# Patient Record
Sex: FEMALE | Race: WHITE | NOT HISPANIC OR LATINO | Employment: FULL TIME | URBAN - METROPOLITAN AREA
[De-identification: names, ages, dates, MRNs, and addresses within clinical notes are randomized per-mention and may not be internally consistent; named-entity substitution may affect disease eponyms.]

---

## 2017-01-16 ENCOUNTER — OFFICE VISIT (OUTPATIENT)
Dept: FAMILY MEDICINE | Facility: CLINIC | Age: 42
End: 2017-01-16
Payer: COMMERCIAL

## 2017-01-16 VITALS
SYSTOLIC BLOOD PRESSURE: 120 MMHG | RESPIRATION RATE: 18 BRPM | TEMPERATURE: 98.1 F | HEIGHT: 64 IN | DIASTOLIC BLOOD PRESSURE: 84 MMHG | HEART RATE: 70 BPM | WEIGHT: 191 LBS | BODY MASS INDEX: 32.61 KG/M2

## 2017-01-16 DIAGNOSIS — R10.11 ABDOMINAL PAIN, RIGHT UPPER QUADRANT: Primary | ICD-10-CM

## 2017-01-16 LAB
ALBUMIN SERPL-MCNC: 3.9 G/DL (ref 3.4–5)
ALP SERPL-CCNC: 88 U/L (ref 40–150)
ALT SERPL W P-5'-P-CCNC: 40 U/L (ref 0–50)
AMYLASE SERPL-CCNC: 34 U/L (ref 30–110)
ANION GAP SERPL CALCULATED.3IONS-SCNC: 9 MMOL/L (ref 3–14)
AST SERPL W P-5'-P-CCNC: 18 U/L (ref 0–45)
BASOPHILS # BLD AUTO: 0 10E9/L (ref 0–0.2)
BASOPHILS NFR BLD AUTO: 0.2 %
BILIRUB SERPL-MCNC: 0.8 MG/DL (ref 0.2–1.3)
BUN SERPL-MCNC: 8 MG/DL (ref 7–30)
CALCIUM SERPL-MCNC: 9 MG/DL (ref 8.5–10.1)
CHLORIDE SERPL-SCNC: 107 MMOL/L (ref 94–109)
CO2 SERPL-SCNC: 24 MMOL/L (ref 20–32)
CREAT SERPL-MCNC: 0.75 MG/DL (ref 0.52–1.04)
DIFFERENTIAL METHOD BLD: NORMAL
EOSINOPHIL # BLD AUTO: 0.2 10E9/L (ref 0–0.7)
EOSINOPHIL NFR BLD AUTO: 2.8 %
ERYTHROCYTE [DISTWIDTH] IN BLOOD BY AUTOMATED COUNT: 12 % (ref 10–15)
GFR SERPL CREATININE-BSD FRML MDRD: 86 ML/MIN/1.7M2
GLUCOSE SERPL-MCNC: 90 MG/DL (ref 70–99)
HCT VFR BLD AUTO: 40.7 % (ref 35–47)
HGB BLD-MCNC: 14.1 G/DL (ref 11.7–15.7)
LIPASE SERPL-CCNC: 137 U/L (ref 73–393)
LYMPHOCYTES # BLD AUTO: 1.9 10E9/L (ref 0.8–5.3)
LYMPHOCYTES NFR BLD AUTO: 23 %
MCH RBC QN AUTO: 32 PG (ref 26.5–33)
MCHC RBC AUTO-ENTMCNC: 34.6 G/DL (ref 31.5–36.5)
MCV RBC AUTO: 92 FL (ref 78–100)
MONOCYTES # BLD AUTO: 0.5 10E9/L (ref 0–1.3)
MONOCYTES NFR BLD AUTO: 5.8 %
NEUTROPHILS # BLD AUTO: 5.5 10E9/L (ref 1.6–8.3)
NEUTROPHILS NFR BLD AUTO: 68.2 %
PLATELET # BLD AUTO: 207 10E9/L (ref 150–450)
POTASSIUM SERPL-SCNC: 3.9 MMOL/L (ref 3.4–5.3)
PROT SERPL-MCNC: 7.3 G/DL (ref 6.8–8.8)
RBC # BLD AUTO: 4.41 10E12/L (ref 3.8–5.2)
SODIUM SERPL-SCNC: 140 MMOL/L (ref 133–144)
WBC # BLD AUTO: 8.1 10E9/L (ref 4–11)

## 2017-01-16 PROCEDURE — 99214 OFFICE O/P EST MOD 30 MIN: CPT | Performed by: NURSE PRACTITIONER

## 2017-01-16 PROCEDURE — 36415 COLL VENOUS BLD VENIPUNCTURE: CPT | Performed by: NURSE PRACTITIONER

## 2017-01-16 PROCEDURE — 80053 COMPREHEN METABOLIC PANEL: CPT | Performed by: NURSE PRACTITIONER

## 2017-01-16 PROCEDURE — 85025 COMPLETE CBC W/AUTO DIFF WBC: CPT | Performed by: NURSE PRACTITIONER

## 2017-01-16 PROCEDURE — 82150 ASSAY OF AMYLASE: CPT | Performed by: NURSE PRACTITIONER

## 2017-01-16 PROCEDURE — 83690 ASSAY OF LIPASE: CPT | Performed by: NURSE PRACTITIONER

## 2017-01-16 RX ORDER — ONDANSETRON 4 MG/1
4-8 TABLET, ORALLY DISINTEGRATING ORAL
Qty: 30 TABLET | Refills: 1 | Status: SHIPPED | OUTPATIENT
Start: 2017-01-16 | End: 2018-02-28

## 2017-01-16 ASSESSMENT — ANXIETY QUESTIONNAIRES
3. WORRYING TOO MUCH ABOUT DIFFERENT THINGS: SEVERAL DAYS
5. BEING SO RESTLESS THAT IT IS HARD TO SIT STILL: NOT AT ALL
IF YOU CHECKED OFF ANY PROBLEMS ON THIS QUESTIONNAIRE, HOW DIFFICULT HAVE THESE PROBLEMS MADE IT FOR YOU TO DO YOUR WORK, TAKE CARE OF THINGS AT HOME, OR GET ALONG WITH OTHER PEOPLE: SOMEWHAT DIFFICULT
7. FEELING AFRAID AS IF SOMETHING AWFUL MIGHT HAPPEN: SEVERAL DAYS
6. BECOMING EASILY ANNOYED OR IRRITABLE: NOT AT ALL
2. NOT BEING ABLE TO STOP OR CONTROL WORRYING: SEVERAL DAYS
1. FEELING NERVOUS, ANXIOUS, OR ON EDGE: SEVERAL DAYS
GAD7 TOTAL SCORE: 7

## 2017-01-16 ASSESSMENT — PATIENT HEALTH QUESTIONNAIRE - PHQ9: 5. POOR APPETITE OR OVEREATING: NEARLY EVERY DAY

## 2017-01-16 NOTE — PROGRESS NOTES
"  SUBJECTIVE:                                                    Bhavana Castellanos is a 41 year old female who presents to clinic today for the following health issues:      Nausea      Duration: x3 days    Description (location/character/radiation): nausea, RUQ abdominal pain, vomiting (x1). See below.    Intensity:  mild, moderate    Therapies tried and outcome: Advil for headache which helped, tums did not help neasua.     Bhavana is in the clinic today with c/o abdominal discomfort, \"its more discomfort than pain,\" and nausea.  Some constipation, last BM yesterday. No blood in her stool.  Appetite way down.  Drinking fluids okay and urinating.  She is uncertain why her symptoms started, \"it all just came on gradually.\"    Problem list and histories reviewed & adjusted, as indicated.  Additional history: as documented    Patient Active Problem List   Diagnosis     Acquired hypothyroidism     CARDIOVASCULAR SCREENING; LDL GOAL LESS THAN 160     Joint pain     Moderate major depression (H)     Vitamin D deficiency disease     Past Surgical History   Procedure Laterality Date     No history of surgery         Social History   Substance Use Topics     Smoking status: Never Smoker      Smokeless tobacco: Never Used     Alcohol Use: Yes      Comment: once per weeks      Family History   Problem Relation Age of Onset     CANCER Father      kidney cancer: in remission     Hypertension Brother      Hypertension Maternal Grandfather      DIABETES Brother      DIABETES Maternal Grandfather      Other Cancer Father      Kidney     Other Cancer Maternal Grandmother      Other Cancer Paternal Grandfather      Depression Father      Thyroid Disease Maternal Grandmother          Current Outpatient Prescriptions   Medication Sig Dispense Refill     levothyroxine (SYNTHROID, LEVOTHROID) 112 MCG tablet Take 1 tablet (112 mcg) by mouth every morning 90 tablet 3     MULTIVITAMIN CHEW   OR None Entered       VITAMIN D 2000 UNIT OR TABS " "None Entered       Allergies   Allergen Reactions     Amoxicillin Rash     BP Readings from Last 3 Encounters:   01/16/17 120/84   06/10/16 117/80   06/09/15 98/70    Wt Readings from Last 3 Encounters:   01/16/17 191 lb (86.637 kg)   06/10/16 187 lb (84.823 kg)   06/09/15 173 lb 9.6 oz (78.744 kg)            Labs reviewed in EPIC  Problem list, Medication list, Allergies, and Medical/Social/Surgical histories reviewed in [facility] and updated as appropriate.    ROS:  Constitutional, HEENT, cardiovascular, pulmonary, gi and gu systems are negative, except as otherwise noted.    OBJECTIVE:                                                    /84 mmHg  Pulse 70  Temp(Src) 98.1  F (36.7  C) (Oral)  Resp 18  Ht 5' 3.5\" (1.613 m)  Wt 191 lb (86.637 kg)  BMI 33.30 kg/m2  LMP 12/26/2016  Breastfeeding? No  Body mass index is 33.3 kg/(m^2).  GENERAL: healthy, alert and no distress  NECK: no adenopathy, no asymmetry, masses, or scars and thyroid normal to palpation  RESP: lungs clear to auscultation - no rales, rhonchi or wheezes  CV: regular rate and rhythm, normal S1 S2, no S3 or S4, no murmur, click or rub, no peripheral edema and peripheral pulses strong  ABDOMEN: soft, nontender, no hepatosplenomegaly, no masses and bowel sounds normal. No rebound or guarding. No CVA tenderness.  SKIN: warm and dry  PSYCH: mentation appears normal, affect normal/bright    Diagnostic Test Results:  Labs drawn and pending.  US ordered.     ASSESSMENT/PLAN:                                                    (R10.11) Abdominal pain, right upper quadrant  (primary encounter diagnosis)  Comment: uncertain. Differentials: Gallbladder, intestinal, liver issues, etc.  Plan: CBC with platelets and differential,         Comprehensive metabolic panel, Amylase, Lipase,        US Abdomen Complete, ondansetron (ZOFRAN ODT) 4        MG ODT tab        Will start with labs and US.  I did discuss the differentials with the patient.  I also " discussed red flag symptoms and reasons to go to the ER.  She will start with dietary modifications and zofran.  Push fluids.  Return or ER if problems.  I will communicate results via mychart.  She agrees/understands.        CLEVELAND Prado [facility]

## 2017-01-16 NOTE — MR AVS SNAPSHOT
"              After Visit Summary   1/16/2017    Bhavana Castellanos    MRN: [id number]           Patient Information     Date Of Birth          1975        Visit Information        Provider Department      1/16/2017 10:00 AM Ana Rosales APRN [facility]        Today's Diagnoses     Abdominal pain, right upper quadrant    -  1       Care Instructions    I would a bland, low fat diet for now.  Push clear fluids (water, power alexis).  Limit juices as they may make you more nauseated.  You can take the zofran as prescribed for nausea.  Abdominal ultrasound at your earliest convenience. [phone] and ask for radiology/ultrasound scheduling.  If at any time your symptoms worsen or become concerning, go to the ER.        [place] Diet  Your healthcare provider may recommend a bland diet if you have an upset stomach. It consists of foods that are mild and easy to digest. It is better to eat small frequent meals rather than 3 large meals a day.    Beverages  OK: Fruit juices, non-caffeinated teas and coffee, non-carbonated monahan  Avoid: Carbonated beverage, caffeinated tea and coffee, all alcoholic beverages  Bread  OK: Refined white, wheat or rye bread, liberty or soda crackers, [place] toast, plain rolls, bagels  Avoid: Whole-grain bread  Cereal  OK: Refined cereals: cooked or ready to eat  Avoid: Whole-grain cereals and granola, or those containing bran, seeds or nuts  Desserts  OK: Peanut butter and all others except those to \"avoid\"  Avoid: Chocolate, cocoa, coconut, popcorn, nuts, seeds, jam, marmalade  Fruits  OK: Canned, cooked, frozen or fresh fruits without seeds or tough skin  Avoid: Olives, skin and seeds of fruit  Meats  OK: All fresh or preserved meat, fish and fowl  Avoid: Any that are prepared with those spices to \"avoid\"  Cheese and eggs  OK: Eggs, cottage cheese, cream cheese, other cheeses  Avoid: All cheeses made with those spices to \"avoid\"  Potatoes and pasta  OK: " "Potato, rice, macaroni, noodles, spaghetti  Avoid: None  Soups  OK: All soups without heavy seasoning  Avoid: Soups made with those spices to \"avoid\"  Vegetables  OK: Canned, cooked, fresh or frozen mildly flavored vegetables without seeds, skins or coarse fiber  Avoid: Vegetables prepared with those spices to \"avoid\"; skin and seeds of vegetables and those with coarse fiber  Spices  OK: Salt, lemon and lime juice, vinegar, all extracts, dain, cinnamon, thyme, mace, allspice, paprika  Avoid: Chili powder, cloves, pepper, seed spices, garlic, gravy pickles, highly seasoned salad dressings    6087-0516 [employer]. [address] [place] [address]. All rights reserved. This information is not intended as a substitute for professional medical care. Always follow your healthcare professional's instructions.        Low-Fat Diet    A low-fat diet will help you lose weight. It also can lower cholesterol and prevent symptoms of gallbladder disease. The average American diet contains up to 50% fat. This means that 50% of all calories come from fat (80 grams to 100 grams of fat per day). Choosing normal portions of foods from the list below can help lower your fat intake. The [place] of [employer] recommends 20% to 35% of your daily calories come from fat. The American Heart Association suggests limiting the amount of unhealthy fats in your diet to fewer than 7% of calories from saturated fats and fewer than 1% from trans fats. The remaining 65% to 80% of calories will come from protein and carbohydrates. This is much healthier for you.  Beverages  Ok: Nonfat milk, coffee, tea, carbonated beverages  Avoid: Whole and reduced-fat milk, evaporated and condensed milk; hot chocolate mixes, milk shakes, malts, eggnog  Bread  Ok: Whole wheat or rye bread, liberty or soda crackers, jojo toast, plain rolls, bagels, English muffins  Avoid: Rolls and breads containing whole milk or egg, waffles, pancakes, " "biscuits, corn bread; cheese crackers, other flavored crackers, pastries, doughnuts  Cereal  Ok: Oatmeal, whole wheat, bran, multi grain, rice  Avoid: Granola or other cereals containing oil, coconut, or more than 2 grams of fat per serving  Desserts  Ok: Gelatin, slushy, flaquito food cake, puddings or sherbet made with nonfat milk, meringues, and nonfat yogurt  Avoid: Any other commercially prepared desserts or desserts containing fat, whole milk, cream, chocolate, and coconut  Fats  Ok: You may have up to 3 teaspoons of fat daily. This can be in the form of butter, margarine, mayonnaise, or healthy oils (canola or olive)  Avoid: Cream, non-dairy creams, cream cheese, gravies, and cream sauces  Fruits  Ok: All fruits prepared without fat  Avoid: Coconut, olives  Meats, poultry, fish  Ok: Limit meat to 6 oz daily (broiled, roasted, baked, grilled, or boiled). Select lean cuts, well-trimmed of fat: beef, fish, lamb, pork and canned fish packed in water; chicken and turkey with the skin removed.  Avoid: Fried meats, fish, poultry, fried eggs and fish canned in oils; fatty meats such as augustine, sausage, corned beef, hot dogs, luncheon meats, or meats with gravies and sauces  Cheese and eggs  Ok: Cheeses labeled \"low fat\"; 3 whole eggs per week, egg whites and egg substitutes as desired  Avoid: All other cheeses  Potatoes, beans, pasta  Ok: Dried beans, split peas, lentils, potatoes, rice, pasta prepared without added fat  Avoid: French fries, potato chips, potatoes prepared with butter, refried beans  Soups  Ok: Bouillon or broth soups without fat and with allowed vegetables  Avoid: Cream based soups  Vegetables  Ok: Fresh, frozen, canned or dried vegetables all prepared without added fat  Avoid: Fried vegetables and those prepared with butter, cream, sauces  Miscellaneous  Ok: Salt, sugar, jelly, hard candy, marshmallows, honey, syrup, spices and herbs, mustard, catsup, lemon, vinegar (to maintain an overall healthy " diet, try to limit sweets and added sugars)  Avoid: Pizza, chocolate, nuts, coconut, cream candies, sunflower, sesame, and other seeds, fried foods, and cream sauces and gravies    9066-8908 [employer]. [address] [place] [address]. All rights reserved. This information is not intended as a substitute for professional medical care. Always follow your healthcare professional's instructions.      Abdominal Pain  Abdominal pain is pain in the stomach or intestinal area. Everyone has this pain from time to time. In many cases it goes away on its own. But abdominal pain can sometimes be due to a serious problem, such as appendicitis. So it s important to know when to seek help.  Causes of abdominal pain  There are many possible causes of abdominal pain. Common causes in adults include:    Constipation, diarrhea, or gas    GERD (gastroesophageal reflux disease) movement of stomach acid into the esophagus, also known as acid reflux or heartburn    Peptic ulcer (a sore in the lining of the stomach or small intestine)    Inflammation of the gallbladder, liver, or pancreas    Gallstones or kidney stones    Appendicitis     Obstruction of the intestines     Hernia (bulging of an internal organ through a muscle or other tissue)    Urinary tract infections    In women, menstrual cramps, fibroids, or endometriosis of the uterus    Inflammation or infection of the intestines  Diagnosing the cause of abdominal pain  Your health care provider will examine you to help find the cause of your pain. If needed, tests will be ordered. Because abdominal pain has so many possible causes, it can be hard to discover the reason for the pain. Giving details about your pain can help. Be ready to tell your health care provider where and when you feel the pain and what makes it better or worse. Also mention whether you have other symptoms such as fever, tiredness, nausea, vomiting, or changes in bathroom  habits.  Treating abdominal pain  Certain causes of pain, such as appendicitis or a bowel obstruction, need emergency treatment. Other problems can be treated with rest, fluids, or medications. Your health care provider can give you specific instructions for treatment or self-care based on the cause of your pain.  If you have vomiting or diarrhea, sip water or other clear fluids. When you are ready to eat solid foods again, start with small amounts of easy-to-digest, low-fat foods, such as applesauce, toast, or crackers.   When to call the doctor  Call 911 or go to the hospital right away if you:    Can t pass stool and are vomiting    Are vomiting blood or have black, tarry diarrhea    Also have chest, neck, or shoulder pain    Feel like you are about to pass out    Have pain in your shoulder blades with nausea    Have sudden, excruciating abdominal pain    Have new, severe pain unlike any you have felt before    Have a belly that is rigid, hard, and tender to touch  Call your doctor if you have:    Pain for more than 5 days    Bloating for more than 2 days    Diarrhea for more than 5 days    Fever of 101 F (38.3 C) or higher    Pain that continues to worsen    Unexplained weight loss    Continued lack of appetite    Blood in the stool  How to prevent abdominal pain  Here are some tips to help prevent abdominal pain:    Eat smaller amounts of food at one time.    Avoid greasy, fried, or other high-fat foods.    Avoid foods that give you gas.    Exercise regularly.    Drink plenty of fluids.  To help prevent symptoms of gastroesophageal reflux disease (GERD):    Quit smoking.    Reduce alcohol and certain foods that increase stomach acid.     Lose excess weight.    Finish eating at least 2 hours before you go to bed or lie down.    Elevate the head of your bed.    7421-5888 [employer]. [address] [place] [address], [place] [address]. All rights reserved. This information is not intended as a substitute for  "professional medical care. Always follow your healthcare professional's instructions.                  Follow-ups after your visit        Future tests that were ordered for you today     Open Future Orders        Priority Expected Expires Ordered    US Abdomen Complete Routine  1/16/2018 1/16/2017            Who to contact     If you have questions or need follow up information about today's clinic visit or your schedule please contact [facility] directly at [phone].  Normal or non-critical lab and imaging results will be communicated to you by [employer], letter or phone within 4 business days after the clinic has received the results. If you do not hear from us within 7 days, please contact the clinic through irisnotet or phone. If you have a critical or abnormal lab result, we will notify you by phone as soon as possible.  Submit refill requests through app2you or call your pharmacy and they will forward the refill request to us. Please allow 3 business days for your refill to be completed.          Additional Information About Your Visit        [employer] Information     app2you gives you secure access to your electronic health record. If you see a primary care provider, you can also send messages to your care team and make appointments. If you have questions, please call your primary care clinic.  If you do not have a primary care provider, please call [phone] and they will assist you.        Care EveryWhere ID     This is your Care EveryWhere ID. This could be used by other organizations to access your [facility] medical records  [id number]        Your Vitals Were     Pulse Temperature Respirations Height BMI (Body Mass Index) Last Period    70 98.1  F (36.7  C) (Oral) 18 5' 3.5\" (1.613 m) 33.30 kg/m2 12/26/2016    Breastfeeding?                   No            Blood Pressure from Last 3 Encounters:   01/16/17 120/84   06/10/16 117/80   06/09/15 98/70    Weight from Last 3 Encounters: "   01/16/17 191 lb (86.637 kg)   06/10/16 187 lb (84.823 kg)   06/09/15 173 lb 9.6 oz (78.744 kg)              We Performed the Following     Amylase     CBC with platelets and differential     Comprehensive metabolic panel     Lipase          Today's Medication Changes          These changes are accurate as of: 1/16/17 10:25 AM.  If you have any questions, ask your nurse or doctor.               Start taking these medicines.        Dose/Directions    ondansetron 4 MG ODT tab   Commonly known as:  ZOFRAN ODT   Used for:  Abdominal pain, right upper quadrant   Started by:  Ana Rosales APRN CNP        Dose:  4-8 mg   Take 1-2 tablets (4-8 mg) by mouth 3 times daily (before meals)   Quantity:  30 tablet   Refills:  1            Where to get your medicines      These medications were sent to [facility] - [place], [place] - [address], [place] [address]     Phone:  [phone]    - ondansetron 4 MG ODT tab             Primary Care Provider Office Phone # Fax #    CLEVELAND Mccloud [phone]       [facility] [address] [place] [address]        Thank you!     Thank you for choosing [facility]  for your care. Our goal is always to provide you with excellent care. Hearing back from our patients is one way we can continue to improve our services. Please take a few minutes to complete the written survey that you may receive in the mail after your visit with us. Thank you!             Your Updated Medication List - Protect others around you: Learn how to safely use, store and throw away your medicines at www.disposemymeds.org.          This list is accurate as of: 1/16/17 10:25 AM.  Always use your most recent med list.                   Brand Name Dispense Instructions for use    levothyroxine 112 MCG tablet    SYNTHROID/LEVOTHROID    90 tablet    Take 1 tablet (112 mcg) by mouth every morning        MULTIVITAMIN CHEW   OR      None Entered       ondansetron 4 MG ODT tab    ZOFRAN ODT    30 tablet    Take 1-2 tablets (4-8 mg) by mouth 3 times daily (before meals)       vitamin D 2000 UNITS tablet      None Entered

## 2017-01-16 NOTE — NURSING NOTE
"Chief Complaint   Patient presents with     Nausea       Initial /84 mmHg  Pulse 70  Temp(Src) 98.1  F (36.7  C) (Oral)  Resp 18  Ht 5' 3.5\" (1.613 m)  Wt 191 lb (86.637 kg)  BMI 33.30 kg/m2  LMP 12/26/2016  Breastfeeding? No Estimated body mass index is 33.3 kg/(m^2) as calculated from the following:    Height as of this encounter: 5' 3.5\" (1.613 m).    Weight as of this encounter: 191 lb (86.637 kg).  BP completed using cuff size: juliana Maria CMA   "

## 2017-01-16 NOTE — PATIENT INSTRUCTIONS
"I would a bland, low fat diet for now.  Push clear fluids (water, power alexis).  Limit juices as they may make you more nauseated.  You can take the zofran as prescribed for nausea.  Abdominal ultrasound at your earliest convenience. [phone] and ask for radiology/ultrasound scheduling.  If at any time your symptoms worsen or become concerning, go to the ER.        [place] Diet  Your healthcare provider may recommend a bland diet if you have an upset stomach. It consists of foods that are mild and easy to digest. It is better to eat small frequent meals rather than 3 large meals a day.    Beverages  OK: Fruit juices, non-caffeinated teas and coffee, non-carbonated monahan  Avoid: Carbonated beverage, caffeinated tea and coffee, all alcoholic beverages  Bread  OK: Refined white, wheat or rye bread, liberty or soda crackers, Isa toast, plain rolls, bagels  Avoid: Whole-grain bread  Cereal  OK: Refined cereals: cooked or ready to eat  Avoid: Whole-grain cereals and granola, or those containing bran, seeds or nuts  Desserts  OK: Peanut butter and all others except those to \"avoid\"  Avoid: Chocolate, cocoa, coconut, popcorn, nuts, seeds, jam, marmalade  Fruits  OK: Canned, cooked, frozen or fresh fruits without seeds or tough skin  Avoid: Olives, skin and seeds of fruit  Meats  OK: All fresh or preserved meat, fish and fowl  Avoid: Any that are prepared with those spices to \"avoid\"  Cheese and eggs  OK: Eggs, cottage cheese, cream cheese, other cheeses  Avoid: All cheeses made with those spices to \"avoid\"  Potatoes and pasta  OK: Potato, rice, macaroni, noodles, spaghetti  Avoid: None  Soups  OK: All soups without heavy seasoning  Avoid: Soups made with those spices to \"avoid\"  Vegetables  OK: Canned, cooked, fresh or frozen mildly flavored vegetables without seeds, skins or coarse fiber  Avoid: Vegetables prepared with those spices to \"avoid\"; skin and seeds of vegetables and those with coarse fiber  Spices  OK: Salt, " lemon and lime juice, vinegar, all extracts, dain, cinnamon, thyme, mace, allspice, paprika  Avoid: Chili powder, cloves, pepper, seed spices, garlic, gravy pickles, highly seasoned salad dressings    6346-4556 [employer]. [address] [place] [address]. All rights reserved. This information is not intended as a substitute for professional medical care. Always follow your healthcare professional's instructions.        Low-Fat Diet    A low-fat diet will help you lose weight. It also can lower cholesterol and prevent symptoms of gallbladder disease. The average American diet contains up to 50% fat. This means that 50% of all calories come from fat (80 grams to 100 grams of fat per day). Choosing normal portions of foods from the list below can help lower your fat intake. The [employer] recommends 20% to 35% of your daily calories come from fat. The American Heart Association suggests limiting the amount of unhealthy fats in your diet to fewer than 7% of calories from saturated fats and fewer than 1% from trans fats. The remaining 65% to 80% of calories will come from protein and carbohydrates. This is much healthier for you.  Beverages  Ok: Nonfat milk, coffee, tea, carbonated beverages  Avoid: Whole and reduced-fat milk, evaporated and condensed milk; hot chocolate mixes, milk shakes, malts, eggnog  Bread  Ok: Whole wheat or rye bread, liberty or soda crackers, jojo toast, plain rolls, bagels, English muffins  Avoid: Rolls and breads containing whole milk or egg, waffles, pancakes, biscuits, corn bread; cheese crackers, other flavored crackers, pastries, doughnuts  Cereal  Ok: Oatmeal, whole wheat, bran, multi grain, rice  Avoid: Granola or other cereals containing oil, coconut, or more than 2 grams of fat per serving  Desserts  Ok: Gelatin, slushy, flaquito food cake, puddings or sherbet made with nonfat milk, meringues, and nonfat yogurt  Avoid: Any other commercially prepared  "desserts or desserts containing fat, whole milk, cream, chocolate, and coconut  Fats  Ok: You may have up to 3 teaspoons of fat daily. This can be in the form of butter, margarine, mayonnaise, or healthy oils (canola or olive)  Avoid: Cream, non-dairy creams, cream cheese, gravies, and cream sauces  Fruits  Ok: All fruits prepared without fat  Avoid: Coconut, olives  Meats, poultry, fish  Ok: Limit meat to 6 oz daily (broiled, roasted, baked, grilled, or boiled). Select lean cuts, well-trimmed of fat: beef, fish, lamb, pork and canned fish packed in water; chicken and turkey with the skin removed.  Avoid: Fried meats, fish, poultry, fried eggs and fish canned in oils; fatty meats such as augustine, sausage, corned beef, hot dogs, luncheon meats, or meats with gravies and sauces  Cheese and eggs  Ok: Cheeses labeled \"low fat\"; 3 whole eggs per week, egg whites and egg substitutes as desired  Avoid: All other cheeses  Potatoes, beans, pasta  Ok: Dried beans, split peas, lentils, potatoes, rice, pasta prepared without added fat  Avoid: French fries, potato chips, potatoes prepared with butter, refried beans  Soups  Ok: Bouillon or broth soups without fat and with allowed vegetables  Avoid: Cream based soups  Vegetables  Ok: Fresh, frozen, canned or dried vegetables all prepared without added fat  Avoid: Fried vegetables and those prepared with butter, cream, sauces  Miscellaneous  Ok: Salt, sugar, jelly, hard candy, marshmallows, honey, syrup, spices and herbs, mustard, catsup, lemon, vinegar (to maintain an overall healthy diet, try to limit sweets and added sugars)  Avoid: Pizza, chocolate, nuts, coconut, cream candies, sunflower, sesame, and other seeds, fried foods, and cream sauces and gravies    8725-7380 [employer]. [address] [place] [address], [place] [address]. All rights reserved. This information is not intended as a substitute for professional medical care. Always follow your healthcare professional's " instructions.      Abdominal Pain  Abdominal pain is pain in the stomach or intestinal area. Everyone has this pain from time to time. In many cases it goes away on its own. But abdominal pain can sometimes be due to a serious problem, such as appendicitis. So it s important to know when to seek help.  Causes of abdominal pain  There are many possible causes of abdominal pain. Common causes in adults include:    Constipation, diarrhea, or gas    GERD (gastroesophageal reflux disease) movement of stomach acid into the esophagus, also known as acid reflux or heartburn    Peptic ulcer (a sore in the lining of the stomach or small intestine)    Inflammation of the gallbladder, liver, or pancreas    Gallstones or kidney stones    Appendicitis     Obstruction of the intestines     Hernia (bulging of an internal organ through a muscle or other tissue)    Urinary tract infections    In women, menstrual cramps, fibroids, or endometriosis of the uterus    Inflammation or infection of the intestines  Diagnosing the cause of abdominal pain  Your health care provider will examine you to help find the cause of your pain. If needed, tests will be ordered. Because abdominal pain has so many possible causes, it can be hard to discover the reason for the pain. Giving details about your pain can help. Be ready to tell your health care provider where and when you feel the pain and what makes it better or worse. Also mention whether you have other symptoms such as fever, tiredness, nausea, vomiting, or changes in bathroom habits.  Treating abdominal pain  Certain causes of pain, such as appendicitis or a bowel obstruction, need emergency treatment. Other problems can be treated with rest, fluids, or medications. Your health care provider can give you specific instructions for treatment or self-care based on the cause of your pain.  If you have vomiting or diarrhea, sip water or other clear fluids. When you are ready to eat solid foods  again, start with small amounts of easy-to-digest, low-fat foods, such as applesauce, toast, or crackers.   When to call the doctor  Call 911 or go to the hospital right away if you:    Can t pass stool and are vomiting    Are vomiting blood or have black, tarry diarrhea    Also have chest, neck, or shoulder pain    Feel like you are about to pass out    Have pain in your shoulder blades with nausea    Have sudden, excruciating abdominal pain    Have new, severe pain unlike any you have felt before    Have a belly that is rigid, hard, and tender to touch  Call your doctor if you have:    Pain for more than 5 days    Bloating for more than 2 days    Diarrhea for more than 5 days    Fever of 101 F (38.3 C) or higher    Pain that continues to worsen    Unexplained weight loss    Continued lack of appetite    Blood in the stool  How to prevent abdominal pain  Here are some tips to help prevent abdominal pain:    Eat smaller amounts of food at one time.    Avoid greasy, fried, or other high-fat foods.    Avoid foods that give you gas.    Exercise regularly.    Drink plenty of fluids.  To help prevent symptoms of gastroesophageal reflux disease (GERD):    Quit smoking.    Reduce alcohol and certain foods that increase stomach acid.     Lose excess weight.    Finish eating at least 2 hours before you go to bed or lie down.    Elevate the head of your bed.    3846-8057 [employer]. [address] [place] [address], [place] [address]. All rights reserved. This information is not intended as a substitute for professional medical care. Always follow your healthcare professional's instructions.

## 2017-01-17 ENCOUNTER — HOSPITAL ENCOUNTER (OUTPATIENT)
Dept: ULTRASOUND IMAGING | Facility: CLINIC | Age: 42
Discharge: HOME | End: 2017-01-17
Attending: NURSE PRACTITIONER | Admitting: NURSE PRACTITIONER
Payer: COMMERCIAL

## 2017-01-17 DIAGNOSIS — R10.11 ABDOMINAL PAIN, RIGHT UPPER QUADRANT: ICD-10-CM

## 2017-01-17 PROCEDURE — 76700 US EXAM ABDOM COMPLETE: CPT

## 2017-01-17 ASSESSMENT — ANXIETY QUESTIONNAIRES: GAD7 TOTAL SCORE: 7

## 2017-01-17 NOTE — PROGRESS NOTES
Quick Note:    Lori Bateman,    This is to let you know that the results of your recent blood tests are all normal. There is no sign of infection or liver involvement. Please proceed with the ultrasound as we had discussed at your appointment.    Ana GUTHRIE CNP    ______

## 2017-01-18 NOTE — PROGRESS NOTES
Quick Note:    Lori Bateman,    This note is to let you know the results of your ultrasound.    The aortic, gallbladder, bile duct, pancreas, spleen, and kidneys are all normal.    Your liver has a small cyst in the left side (not cancer) and this should not be causing your problems. Also, your liver has fat through it (infiltrated). For this I would recommend a low fat diet and weight loss.    Let me know if you have any questions.    I hope you are doing okay.    Ana GUTHRIE CNP    ______

## 2017-06-12 ENCOUNTER — OFFICE VISIT (OUTPATIENT)
Dept: FAMILY MEDICINE | Facility: CLINIC | Age: 42
End: 2017-06-12
Payer: COMMERCIAL

## 2017-06-12 VITALS
HEIGHT: 63 IN | OXYGEN SATURATION: 98 % | DIASTOLIC BLOOD PRESSURE: 77 MMHG | HEART RATE: 70 BPM | SYSTOLIC BLOOD PRESSURE: 125 MMHG | TEMPERATURE: 98.3 F | BODY MASS INDEX: 31.18 KG/M2 | RESPIRATION RATE: 20 BRPM | WEIGHT: 176 LBS

## 2017-06-12 DIAGNOSIS — Z12.31 VISIT FOR SCREENING MAMMOGRAM: ICD-10-CM

## 2017-06-12 DIAGNOSIS — Z23 NEED FOR TETANUS BOOSTER: ICD-10-CM

## 2017-06-12 DIAGNOSIS — Z01.419 ENCOUNTER FOR GYNECOLOGICAL EXAMINATION WITHOUT ABNORMAL FINDING: Primary | ICD-10-CM

## 2017-06-12 DIAGNOSIS — L72.11 PILAR CYST: ICD-10-CM

## 2017-06-12 DIAGNOSIS — F32.1 MAJOR DEPRESSIVE DISORDER, SINGLE EPISODE, MODERATE (H): ICD-10-CM

## 2017-06-12 DIAGNOSIS — E03.9 ACQUIRED HYPOTHYROIDISM: ICD-10-CM

## 2017-06-12 DIAGNOSIS — Z11.3 SCREEN FOR STD (SEXUALLY TRANSMITTED DISEASE): ICD-10-CM

## 2017-06-12 LAB
DEPRECATED CALCIDIOL+CALCIFEROL SERPL-MC: 27 UG/L (ref 20–75)
HCV AB SERPL QL IA: NORMAL
HGB BLD-MCNC: 14.3 G/DL (ref 11.7–15.7)
HIV 1+2 AB+HIV1 P24 AG SERPL QL IA: NORMAL

## 2017-06-12 PROCEDURE — 84443 ASSAY THYROID STIM HORMONE: CPT | Performed by: NURSE PRACTITIONER

## 2017-06-12 PROCEDURE — 82947 ASSAY GLUCOSE BLOOD QUANT: CPT | Performed by: NURSE PRACTITIONER

## 2017-06-12 PROCEDURE — 82306 VITAMIN D 25 HYDROXY: CPT | Performed by: NURSE PRACTITIONER

## 2017-06-12 PROCEDURE — 90471 IMMUNIZATION ADMIN: CPT | Performed by: NURSE PRACTITIONER

## 2017-06-12 PROCEDURE — 80061 LIPID PANEL: CPT | Performed by: NURSE PRACTITIONER

## 2017-06-12 PROCEDURE — 99396 PREV VISIT EST AGE 40-64: CPT | Mod: 25 | Performed by: NURSE PRACTITIONER

## 2017-06-12 PROCEDURE — 36415 COLL VENOUS BLD VENIPUNCTURE: CPT | Performed by: NURSE PRACTITIONER

## 2017-06-12 PROCEDURE — 85018 HEMOGLOBIN: CPT | Performed by: NURSE PRACTITIONER

## 2017-06-12 PROCEDURE — 86803 HEPATITIS C AB TEST: CPT | Performed by: NURSE PRACTITIONER

## 2017-06-12 PROCEDURE — 86780 TREPONEMA PALLIDUM: CPT | Performed by: NURSE PRACTITIONER

## 2017-06-12 PROCEDURE — 90715 TDAP VACCINE 7 YRS/> IM: CPT | Performed by: NURSE PRACTITIONER

## 2017-06-12 PROCEDURE — 87389 HIV-1 AG W/HIV-1&-2 AB AG IA: CPT | Performed by: NURSE PRACTITIONER

## 2017-06-12 RX ORDER — LEVOTHYROXINE SODIUM 112 UG/1
112 TABLET ORAL DAILY
Qty: 90 TABLET | Refills: 3 | Status: SHIPPED | OUTPATIENT
Start: 2017-06-12 | End: 2018-06-15

## 2017-06-12 NOTE — PROGRESS NOTES
"   SUBJECTIVE:     CC: Bhavana Castellanos is an 41 year old woman who presents for preventive health visit.     Physical   Annual:     Getting at least 3 servings of Calcium per day::  Yes    Bi-annual eye exam::  Yes    Dental care twice a year::  Yes    Sleep apnea or symptoms of sleep apnea::  Daytime drowsiness    Diet::  Other    Frequency of exercise::  4-5 days/week    Duration of exercise::  30-45 minutes    Taking medications regularly::  Yes    Medication side effects::  None    Additional concerns today::  YES    1. cyst on top of head-     Depression:  Going well.  Seasonal.  Not on meds.  Not currently in therapy.    Weight:  Down.  Avoiding high fructose corn syrup.  \"the weight just started falling off.\"        Today's PHQ-2 Score:   PHQ-2 ( 1999 [employer]) 6/7/2017   Q1: Little interest or pleasure in doing things 1   Q2: Feeling down, depressed or hopeless 1   PHQ-2 Score 2   Q1: Little interest or pleasure in doing things Several days   Q2: Feeling down, depressed or hopeless Several days   PHQ-2 Score 2       Abuse: Current or Past(Physical, Sexual or Emotional)- No  Do you feel safe in your environment - Yes    Social History   Substance Use Topics     Smoking status: Never Smoker     Smokeless tobacco: Never Used     Alcohol use Yes      Comment: once per weeks      The patient does not drink >3 drinks per day nor >7 drinks per week.    Recent Labs   Lab Test  06/09/15   1108  06/12/12   1528   CHOL  119  146   HDL  44*  39*   LDL  59  88   TRIG  82  94   CHOLHDLRATIO  2.7  3.7       Reviewed orders with patient.  Reviewed health maintenance and updated orders accordingly - Yes    Mammo Decision Support:  Patient under age 50, mutual decision reflected in health maintenance.      Pertinent mammograms are reviewed under the imaging tab.  History of abnormal Pap smear:   Last 3 Pap Results:   PAP (no units)   Date Value   06/09/2015 NIL   06/12/2012 NIL   03/05/2010 NIL       Reviewed and updated as " needed this visit by clinical staff  Tobacco  Allergies  Meds  Med Hx  Surg Hx  Fam Hx  Soc Hx        Reviewed and updated as needed this visit by Provider            ROS:  C: NEGATIVE for fever, chills, change in weight  I: NEGATIVE for worrisome rashes, moles or lesions  E: NEGATIVE for vision changes or irritation  ENT: NEGATIVE for ear, mouth and throat problems  R: NEGATIVE for significant cough or SOB  B: NEGATIVE for masses, tenderness or discharge  CV: NEGATIVE for chest pain, palpitations or peripheral edema  GI: NEGATIVE for nausea, abdominal pain, heartburn, or change in bowel habits  : NEGATIVE for unusual urinary or vaginal symptoms. Periods are regular.  M: NEGATIVE for significant arthralgias or myalgia  N: NEGATIVE for weakness, dizziness or paresthesias  E: NEGATIVE for temperature intolerance, skin/hair changes  P: NEGATIVE for changes in mood or affect    Problem list, Medication list, Allergies, and Medical/Social/Surgical histories reviewed in [facility] and updated as appropriate.  Labs reviewed in EPIC  BP Readings from Last 3 Encounters:   06/12/17 125/77   01/16/17 120/84   06/10/16 117/80    Wt Readings from Last 3 Encounters:   06/12/17 176 lb (79.8 kg)   01/16/17 191 lb (86.6 kg)   06/10/16 187 lb (84.8 kg)                  Patient Active Problem List   Diagnosis     Acquired hypothyroidism     CARDIOVASCULAR SCREENING; LDL GOAL LESS THAN 160     Joint pain     Moderate major depression (H)     Vitamin D deficiency disease     Past Surgical History:   Procedure Laterality Date     NO HISTORY OF SURGERY         Social History   Substance Use Topics     Smoking status: Never Smoker     Smokeless tobacco: Never Used     Alcohol use Yes      Comment: once per weeks      Family History   Problem Relation Age of Onset     CANCER Father      kidney cancer: in remission     Other Cancer Father      Kidney     Depression Father      Other Cancer Maternal Grandmother      Thyroid Disease Maternal  "Grandmother      Hypertension Maternal Grandfather      DIABETES Maternal Grandfather      Other Cancer Paternal Grandfather      Hypertension Brother      DIABETES Brother          Current Outpatient Prescriptions   Medication Sig Dispense Refill     Omega-3 Fatty Acids (FISH OIL PO)        MILK THISTLE PO        levothyroxine (SYNTHROID/LEVOTHROID) 112 MCG tablet TAKE ONE TABLET BY MOUTH DAILY IN THE MORNING 30 tablet 0     ondansetron (ZOFRAN ODT) 4 MG ODT tab Take 1-2 tablets (4-8 mg) by mouth 3 times daily (before meals) 30 tablet 1     MULTIVITAMIN CHEW   OR None Entered       VITAMIN D 2000 UNIT OR TABS None Entered       Allergies   Allergen Reactions     Amoxicillin Rash     Recent Labs   Lab Test  01/16/17   1029  06/10/16   1036  06/09/15   1108   06/12/12   1528  06/21/11   1049   05/20/09   1932   LDL   --    --   59   --   88  93   --    --    HDL   --    --   44*   --   39*  38*   --    --    TRIG   --    --   82   --   94  180*   --    --    ALT  40   --    --    --    --    --    --    --    CR  0.75   --    --    --    --    --    --   0.88   GFRESTIMATED  86   --    --    --    --    --    --   74   GFRESTBLACK  >90   GFR Calc     --    --    --    --    --    --   >90   POTASSIUM  3.9   --    --    --    --    --    --   3.8   TSH   --   1.44  0.76   < >  2.05  1.59   < >  0.47    < > = values in this interval not displayed.      OBJECTIVE:     /77  Pulse 70  Temp 98.3  F (36.8  C) (Oral)  Resp 20  Ht 5' 3.25\" (1.607 m)  Wt 176 lb (79.8 kg)  SpO2 98%  Breastfeeding? No  BMI 30.93 kg/m2  EXAM:  GENERAL: healthy, alert and no distress  EYES: Eyes grossly normal to inspection, PERRL and conjunctivae and sclerae normal  HENT: ear canals and TM's normal, nose and mouth without ulcers or lesions  NECK: no adenopathy, no asymmetry, masses, or scars and thyroid normal to palpation  RESP: lungs clear to auscultation - no rales, rhonchi or wheezes  BREAST: normal without " masses, tenderness or nipple discharge and no palpable axillary masses or adenopathy  CV: regular rate and rhythm, normal S1 S2, no S3 or S4, no murmur, click or rub, no peripheral edema and peripheral pulses strong  ABDOMEN: soft, nontender, no hepatosplenomegaly, no masses and bowel sounds normal   (female): normal female external genitalia, normal urethral meatus, vaginal mucosa pink, moist, well rugated, and normal cervix/adnexa/uterus without masses or discharge  MS: no gross musculoskelet defects noted, no edema  SKIN: her skin is clear.  She has a domed nodule on her right upper scalp consistent with a pilar cyst.  NEURO: Normal strength and tone, mentation intact and speech normal  PSYCH: mentation appears normal, affect normal/bright    ASSESSMENT/PLAN:     (Z01.419) Encounter for gynecological examination without abnormal finding  (primary encounter diagnosis)  Comment:   Plan: Can Do Program Referral, Vitamin D Deficiency,         Lipid panel reflex to direct LDL, Hemoglobin,         Glucose        Weight is down--congratulations!    (F32.1) Major depressive disorder, single episode, moderate (H)  Comment: stable  Plan: DEPRESSION ACTION PLAN (DAP)        Stable.  She is not on meds at this time.  She will continue self cares and will follow up with me if her symptoms worsen in any way.     (E03.9) Acquired hypothyroidism  Comment:   Plan: TSH WITH FREE T4 REFLEX, levothyroxine         (SYNTHROID/LEVOTHROID) 112 MCG tablet        Labs pending.     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    (Z23) Need for tetanus  "booster  Comment:   Plan: TDAP VACCINE (ADACEL)        given      COUNSELING:  Reviewed preventive health counseling, as reflected in patient instructions         reports that she has never smoked. She has never used smokeless tobacco.    Estimated body mass index is 30.93 kg/(m^2) as calculated from the following:    Height as of this encounter: 5' 3.25\" (1.607 m).    Weight as of this encounter: 176 lb (79.8 kg).   Weight management plan: Discussed healthy diet and exercise guidelines and patient will follow up in 12 months in clinic to re-evaluate.    Counseling Resources:  ATP IV Guidelines  Pooled Cohorts Equation Calculator  Breast Cancer Risk Calculator  FRAX Risk Assessment  ICSI Preventive Guidelines  Dietary Guidelines for Americans, 2010  USDA's MyPlate  ASA Prophylaxis  Lung CA Screening    [facility]  Answers for HPI/ROS submitted by the patient on 6/7/2017   PHQ-2 Score: 2      "

## 2017-06-12 NOTE — MR AVS SNAPSHOT
After Visit Summary   6/12/2017    Bhavana Castellanos    MRN: [id number]           Patient Information     Date Of Birth          1975        Visit Information        Provider Department      6/12/2017 9:40 AM Ana Rosales APRN [facility]        Today's Diagnoses     Encounter for gynecological examination without abnormal finding    -  1    Major depressive disorder, single episode, moderate (H)        Acquired hypothyroidism        Pilar cyst        Screen for STD (sexually transmitted disease)        Visit for screening mammogram        Need for tetanus booster          Care Instructions      Preventive Health Recommendations  Female Ages 40 to 49    Yearly exam:     See your health care provider every year in order to  1. Review health changes.   2. Discuss preventive care.    3. Review your medicines if your doctor prescribed any.      Get a Pap test every three years (unless you have an abnormal result and your provider advises testing more often).      If you get Pap tests with HPV test, you only need to test every 5 years, unless you have an abnormal result. You do not need a Pap test if your uterus was removed (hysterectomy) and you have not had cancer.      You should be tested each year for STDs (sexually transmitted diseases), if you're at risk.       Ask your doctor if you should have a mammogram.      Have a colonoscopy (test for colon cancer) if someone in your family has had colon cancer or polyps before age 50.       Have a cholesterol test every 5 years.       Have a diabetes test (fasting glucose) after age 45. If you are at risk for diabetes, you should have this test every 3 years.    Shots: Get a flu shot each year. Get a tetanus shot every 10 years.     Nutrition:     Eat at least 5 servings of fruits and vegetables each day.    Eat whole-grain bread, whole-wheat pasta and brown rice instead of white grains and rice.    Talk to your provider  about Calcium and Vitamin D.     Lifestyle    Exercise at least 150 minutes a week (an average of 30 minutes a day, 5 days a week). This will help you control your weight and prevent disease.    Limit alcohol to one drink per day.    No smoking.     Wear sunscreen to prevent skin cancer.    See your dentist every six months for an exam and cleaning.          Follow-ups after your visit        Additional Services     Can Do Program Referral       CAN DO is different from other healthy lifestyle and weight loss programs. CAN DO personal health coaches support you in clarifying what you want for yourself and your well-being. Together we create a roadmap to guide your journey to thriving well-being - one step at a time - for lasting success. Working under the leadership of a physician medical director, our health coaching team includes a doctorate -level prepared expert in health management, a physician and an exercise specialist. We support you in identifying what is important to you, setting meaningful goals and addressing barriers to success. Together we aliya your progress over time. CAN DO has helped hundreds reach their goals: from people who just want to be healthier and enjoy more energy, to those who want to lose or gain weight.            GENERAL SURG ADULT REFERRAL       Your provider has referred you to: G: [facility] [place] [phone]   [email or web address]  UM: General Surgery [facility] [phone]   [email or web address]  UM: [place] [facility] [phone]   [email or web address]    Please be aware that coverage of these services is subject to the terms and limitations of your health insurance plan.  Call member services at your health plan with any benefit or coverage questions.      Please bring the following with you to your  "appointment:    (1) Any X-Rays, CTs or MRIs which have been performed.  Contact the facility where they were done to arrange for  prior to your scheduled appointment.   (2) List of current medications   (3) This referral request   (4) Any documents/labs given to you for this referral                  Future tests that were ordered for you today     Open Future Orders        Priority Expected Expires Ordered    MA SCREENING DIGITAL BILAT - Future  (s+30) Routine  6/12/2018 6/12/2017            Who to contact     If you have questions or need follow up information about today's clinic visit or your schedule please contact [facility] directly at [phone].  Normal or non-critical lab and imaging results will be communicated to you by MyChart, letter or phone within 4 business days after the clinic has received the results. If you do not hear from us within 7 days, please contact the clinic through [employer] or phone. If you have a critical or abnormal lab result, we will notify you by phone as soon as possible.  Submit refill requests through [employer] or call your pharmacy and they will forward the refill request to us. Please allow 3 business days for your refill to be completed.          Additional Information About Your Visit        MyChart Information     [employer] gives you secure access to your electronic health record. If you see a primary care provider, you can also send messages to your care team and make appointments. If you have questions, please call your primary care clinic.  If you do not have a primary care provider, please call [phone] and they will assist you.        Care EveryWhere ID     This is your Care EveryWhere ID. This could be used by other organizations to access your [place] medical records  [id number]        Your Vitals Were     Pulse Temperature Respirations Height Pulse Oximetry Breastfeeding?    70 98.3  F (36.8  C) (Oral) 20 5' 3.25\" (1.607 m) 98% No    " BMI (Body Mass Index)                   30.93 kg/m2            Blood Pressure from Last 3 Encounters:   06/12/17 125/77   01/16/17 120/84   06/10/16 117/80    Weight from Last 3 Encounters:   06/12/17 176 lb (79.8 kg)   01/16/17 191 lb (86.6 kg)   06/10/16 187 lb (84.8 kg)              We Performed the Following     Anti Treponema     Can Do Program Referral     DEPRESSION ACTION PLAN (DAP)     GENERAL SURG ADULT REFERRAL     Glucose     Hemoglobin     Hepatitis C antibody     HIV Antigen Antibody Combo     Lipid panel reflex to direct LDL     TDAP VACCINE (ADACEL)     TSH WITH FREE T4 REFLEX     Vitamin D Deficiency          Today's Medication Changes          These changes are accurate as of: 6/12/17  9:56 AM.  If you have any questions, ask your nurse or doctor.               These medicines have changed or have updated prescriptions.        Dose/Directions    levothyroxine 112 MCG tablet   Commonly known as:  SYNTHROID/LEVOTHROID   This may have changed:  See the new instructions.   Used for:  Acquired hypothyroidism   Changed by:  Ana Rosales APRN CNP        Dose:  112 mcg   Take 1 tablet (112 mcg) by mouth daily   Quantity:  90 tablet   Refills:  3            Where to get your medicines      These medications were sent to [address]  [address] [place] [id number]     Phone:  [phone]     levothyroxine 112 MCG tablet                Primary Care Provider Office Phone # Fax #    CLEVELAND Mccloud [phone]       [facility] [address] [place] [address]        Thank you!     Thank you for choosing [facility]  for your care. Our goal is always to provide you with excellent care. Hearing back from our patients is one way we can continue to improve our services. Please take a few minutes to complete the written survey that you may receive in the mail after your  visit with us. Thank you!             Your Updated Medication List - Protect others around you: Learn how to safely use, store and throw away your medicines at www.disposemymeds.org.          This list is accurate as of: 6/12/17  9:56 AM.  Always use your most recent med list.                   Brand Name Dispense Instructions for use    FISH OIL PO          levothyroxine 112 MCG tablet    SYNTHROID/LEVOTHROID    90 tablet    Take 1 tablet (112 mcg) by mouth daily       MILK THISTLE PO          MULTIVITAMIN CHEW   OR      None Entered       ondansetron 4 MG ODT tab    ZOFRAN ODT    30 tablet    Take 1-2 tablets (4-8 mg) by mouth 3 times daily (before meals)       vitamin D 2000 UNITS tablet      None Entered

## 2017-06-12 NOTE — NURSING NOTE
"Chief Complaint   Patient presents with     Physical       Initial /77  Pulse 70  Temp 98.3  F (36.8  C) (Oral)  Resp 20  Ht 5' 3.25\" (1.607 m)  Wt 176 lb (79.8 kg)  SpO2 98%  Breastfeeding? No  BMI 30.93 kg/m2 Estimated body mass index is 30.93 kg/(m^2) as calculated from the following:    Height as of this encounter: 5' 3.25\" (1.607 m).    Weight as of this encounter: 176 lb (79.8 kg).  Medication Reconciliation: complete       Manish Topete MA       "

## 2017-06-13 LAB
CHOLEST SERPL-MCNC: 137 MG/DL
GLUCOSE SERPL-MCNC: 76 MG/DL (ref 70–99)
HDLC SERPL-MCNC: 42 MG/DL
LDLC SERPL CALC-MCNC: 79 MG/DL
NONHDLC SERPL-MCNC: 95 MG/DL
T PALLIDUM IGG+IGM SER QL: NEGATIVE
TRIGL SERPL-MCNC: 79 MG/DL
TSH SERPL DL<=0.005 MIU/L-ACNC: 1.98 MU/L (ref 0.4–4)

## 2017-06-13 ASSESSMENT — PATIENT HEALTH QUESTIONNAIRE - PHQ9: SUM OF ALL RESPONSES TO PHQ QUESTIONS 1-9: 10

## 2017-06-14 NOTE — PROGRESS NOTES
Lori Bateman,    This note is to let you know the results of your recent lab studies.    Your comprehensive STD (sexually transmitted diseases) panel is negative.   There is no sign of HIV, hepatitis, syphilis, gonorrhea, or chlamydia.     Your blood sugar and thyroid results are all normal. Please continue taking your current thyroid dose and I will plan to recheck your thyroid level in 1 year.    Your cholesterol panel is good, except your HDL cholesterol (the good cholesterol) is slightly low. This may be improved with a diet low in fat and cholesterol in regular aerobic activity. I will plan to recheck your cholesterol level in 1-2 years.    Let me know if having questions. It was great seeing you again.    Ana GUTHRIE CNP

## 2017-06-16 ENCOUNTER — OFFICE VISIT (OUTPATIENT)
Dept: SURGERY | Facility: CLINIC | Age: 42
End: 2017-06-16
Payer: COMMERCIAL

## 2017-06-16 VITALS
SYSTOLIC BLOOD PRESSURE: 130 MMHG | HEIGHT: 63 IN | HEART RATE: 77 BPM | BODY MASS INDEX: 31.36 KG/M2 | WEIGHT: 177 LBS | DIASTOLIC BLOOD PRESSURE: 82 MMHG

## 2017-06-16 DIAGNOSIS — R22.0 SCALP MASS: Primary | ICD-10-CM

## 2017-06-16 PROCEDURE — 99242 OFF/OP CONSLTJ NEW/EST SF 20: CPT | Performed by: SURGERY

## 2017-06-16 NOTE — MR AVS SNAPSHOT
After Visit Summary   6/16/2017    Bhavana Castellanos    MRN: [id number]           Patient Information     Date Of Birth          1975        Visit Information        Provider Department      6/16/2017 3:45 PM Lori Acosta MD [facility] [place] [facility] General Surgery      Care Instructions    Your surgery is scheduled for 7/17/17 7:30 am at [facility]          Follow-ups after your visit        Your next 10 appointments already scheduled     Jul 17, 2017   Procedure with Lori Acosta MD   [facility] (--)    [address]  [place] [address] [phone]            Jul 17, 2017 11:00 AM CDT   Screening Mammogram with EAMA1   [facility] ([facility])    [address] ,[address]  [facility] [address] [phone]           Do NOT use body powder, lotions, perfume or deodorant the day of the exam.      If your last mammogram was not done at [facility], please bring your mammogram films. We will need the name of your provider to send a copy of your report.        A mammogram may be covered on an annual or biannual basis, please check with your insurance company.               Who to contact     If you have questions or need follow up information about today's clinic visit or your schedule please contact [facility] directly at [phone].  Normal or non-critical lab and imaging results will be communicated to you by MyChart, letter or phone within 4 business days after the clinic has received the results. If you do not hear from us within 7 days, please contact the clinic through MyChart or phone. If you have a critical or abnormal lab result, we will notify you by phone as soon as possible.  Submit refill requests through [employer] or call your pharmacy and they will forward the refill request to us. Please allow 3 business days for your refill to be  "completed.          Additional Information About Your Visit        [employer] Information     [employer] gives you secure access to your electronic health record. If you see a primary care provider, you can also send messages to your care team and make appointments. If you have questions, please call your primary care clinic.  If you do not have a primary care provider, please call [phone] and they will assist you.        Care EveryWhere ID     This is your Care EveryWhere ID. This could be used by other organizations to access your [place] medical records  [id number]        Your Vitals Were     Pulse Height BMI (Body Mass Index)             77 5' 3\" (1.6 m) 31.35 kg/m2          Blood Pressure from Last 3 Encounters:   06/16/17 130/82   06/12/17 125/77   01/16/17 120/84    Weight from Last 3 Encounters:   06/16/17 177 lb (80.3 kg)   06/12/17 176 lb (79.8 kg)   01/16/17 191 lb (86.6 kg)              Today, you had the following     No orders found for display       Primary Care Provider Office Phone # Fax #    CLEVELAND Mccloud [facility] [phone]       [facility] [address] [place] [address]        Thank you!     Thank you for choosing [facility]  for your care. Our goal is always to provide you with excellent care. Hearing back from our patients is one way we can continue to improve our services. Please take a few minutes to complete the written survey that you may receive in the mail after your visit with us. Thank you!             Your Updated Medication List - Protect others around you: Learn how to safely use, store and throw away your medicines at www.disposemymeds.org.          This list is accurate as of: 6/16/17  4:04 PM.  Always use your most recent med list.                   Brand Name Dispense Instructions for use    FISH OIL PO          levothyroxine 112 MCG tablet    SYNTHROID/LEVOTHROID    90 tablet    Take 1 tablet (112 mcg) by mouth " daily       MILK THISTLE PO          MULTIVITAMIN CHEW   OR      None Entered       ondansetron 4 MG ODT tab    ZOFRAN ODT    30 tablet    Take 1-2 tablets (4-8 mg) by mouth 3 times daily (before meals)       vitamin D 2000 UNITS tablet      None Entered

## 2017-06-16 NOTE — LETTER
"   2017    RE:  Bhavana Castellanos-:  [birth year]    HISTORY OF PRESENT ILLNESS:  Bhavana Castellanos is a 41 year old female who is seen in consultation at the request of Dr. Rosales for evaluation of multiple scalp lesions. She reports she has had \"bumps\" on her head since she was a teenager. There is one on her right lateral scalp which has increased in size over the past year and has become symptomatic. It is very bothersome when wearing her fencing mask. she believes one of the lesions has drained in the past. The right sided lesion has not. She currently has three palpable bumps. She has no other history of similar lesions. No family history of lipomas, skin cancers or soft tissue masses that she is aware of.      REVIEW OF SYSTEMS:  10 point review of systems completed and otherwise negative aside from as listed in HPI.      Vitals: /82  Pulse 77  Ht 5' 3\" (1.6 m)  Wt 177 lb (80.3 kg)  BMI 31.35 kg/m2  BMI= Body mass index is 31.35 kg/(m^2).     EXAM:  GENERAL: healthy, alert and no distress   PSYCH: pleasant, normal affect  HEENT: moist mucus membranes, no scleral icterus  CARDIOVASCULAR:  RRR  RESPIRATORY: non labored breathing  NECK: Neck supple. No adenopathy. Thyroid symmetric, normal size,  GI: soft, nontender, nondistended, no hernias palpable, no hepatosplenomegaly, normal bowel sounds  Extremities: warm and well perfused, no edema  SKIN: right scalp: 2.5cm raised lesion with underlying fluctuance, no erythema or induration. Left posterior scalp lesion 1cm in size, nontender, raised. Right posterior scalp 0.5cm raised lesion. No erythema or induration.    LYMPH: no axillary adenopathy     All labs and imaging personally reviewed and significant for: none     ASSESSMENT:  Bhavana Castellanos is a 41 year old with a PMH s/f hypothyroidism who presents with multiple scalp lesions, one of which is enlarging and symptomatic. I recommend excision of the two larger areas and consideration for " excision of the smaller lesion. We discussed the risks, benefits, indications and alternatives and she would like to proceed.          PLAN:  Excision of multiple scalp lesions under local anesthetic.      It was my pleasure to participate in the care of Bhavana Castellanos in clinic today. Thank you for this consultation.         Lori Acosta MD

## 2017-06-16 NOTE — PROGRESS NOTES
"[facility] General Surgery Clinic Consultation    CHIEF COMPLAINT:  Chief Complaint   Patient presents with     Consult     cyst on head       HISTORY OF PRESENT ILLNESS:  Bhavana Castellanos is a 41 year old female who is seen in consultation at the request of Dr. Rosales for evaluation of multiple scalp lesions. She reports she has had \"bumps\" on her head since she was a teenager. There is one on her right lateral scalp which has increased in size over the past year and has become symptomatic. It is very bothersome when wearing her fencing mask. she believes one of the lesions has drained in the past. The right sided lesion has not. She currently has three palpable bumps. She has no other history of similar lesions. No family history of lipomas, skin cancers or soft tissue masses that she is aware of.     REVIEW OF SYSTEMS:  10 point review of systems completed and otherwise negative aside from as listed in HPI.     Past Medical History:   Diagnosis Date     Depressive disorder 1994     Hypothyroidism        Past Surgical History:   Procedure Laterality Date     NO HISTORY OF SURGERY     [place] teeth     Family History   Problem Relation Age of Onset     CANCER Father      kidney cancer: in remission     Other Cancer Father      Kidney     Depression Father      Other Cancer Maternal Grandmother      Thyroid Disease Maternal Grandmother      Hypertension Maternal Grandfather      DIABETES Maternal Grandfather      Other Cancer Paternal Grandfather      Hypertension Brother      DIABETES Brother        Social History   Substance Use Topics     Smoking status: Never Smoker     Smokeless tobacco: Never Used     Alcohol use Yes      Comment: once per weeks        Patient Active Problem List   Diagnosis     Acquired hypothyroidism     CARDIOVASCULAR SCREENING; LDL GOAL LESS THAN 160     Joint pain     Moderate major depression (H)     Vitamin D deficiency disease       Allergies   Allergen Reactions     Amoxicillin " "Rash       Current Outpatient Prescriptions   Medication Sig Dispense Refill     Omega-3 Fatty Acids (FISH OIL PO)        MILK THISTLE PO        levothyroxine (SYNTHROID/LEVOTHROID) 112 MCG tablet Take 1 tablet (112 mcg) by mouth daily 90 tablet 3     ondansetron (ZOFRAN ODT) 4 MG ODT tab Take 1-2 tablets (4-8 mg) by mouth 3 times daily (before meals) 30 tablet 1     MULTIVITAMIN CHEW   OR None Entered       VITAMIN D 2000 UNIT OR TABS None Entered         Vitals: /82  Pulse 77  Ht 5' 3\" (1.6 m)  Wt 177 lb (80.3 kg)  BMI 31.35 kg/m2  BMI= Body mass index is 31.35 kg/(m^2).    EXAM:  GENERAL: healthy, alert and no distress   PSYCH: pleasant, normal affect  HEENT: moist mucus membranes, no scleral icterus  CARDIOVASCULAR:  RRR  RESPIRATORY: non labored breathing  NECK: Neck supple. No adenopathy. Thyroid symmetric, normal size,  GI: soft, nontender, nondistended, no hernias palpable, no hepatosplenomegaly, normal bowel sounds  Extremities: warm and well perfused, no edema  SKIN: right scalp: 2.5cm raised lesion with underlying fluctuance, no erythema or induration. Left posterior scalp lesion 1cm in size, nontender, raised. Right posterior scalp 0.5cm raised lesion. No erythema or induration.    LYMPH: no axillary adenopathy    All labs and imaging personally reviewed and significant for: none    ASSESSMENT:  Bhavana Castellanos is a 41 year old with a PMH s/f hypothyroidism who presents with multiple scalp lesions, one of which is enlarging and symptomatic. I recommend excision of the two larger areas and consideration for excision of the smaller lesion. We discussed the risks, benefits, indications and alternatives and she would like to proceed.        PLAN:  Excision of multiple scalp lesions under local anesthetic.     It was my pleasure to participate in the care of Bhavana Castellanos in clinic today. Thank you for this consultation.     Total time with patient visit: 25 minutes including discussions about the " plan of care and care coordination with the patient.    Lori Acosta MD    Please route or send letter to:  Primary Care Provider (PCP) and Referring Provider

## 2017-07-17 ENCOUNTER — APPOINTMENT (OUTPATIENT)
Dept: SURGERY | Facility: PHYSICIAN GROUP | Age: 42
End: 2017-07-17
Payer: COMMERCIAL

## 2017-07-17 ENCOUNTER — HOSPITAL ENCOUNTER (OUTPATIENT)
Facility: CLINIC | Age: 42
Discharge: HOME | End: 2017-07-17
Attending: SURGERY | Admitting: SURGERY
Payer: COMMERCIAL

## 2017-07-17 ENCOUNTER — SURGERY (OUTPATIENT)
Age: 42
End: 2017-07-17

## 2017-07-17 VITALS
OXYGEN SATURATION: 95 % | DIASTOLIC BLOOD PRESSURE: 93 MMHG | RESPIRATION RATE: 18 BRPM | BODY MASS INDEX: 32.43 KG/M2 | TEMPERATURE: 97.5 F | WEIGHT: 183 LBS | SYSTOLIC BLOOD PRESSURE: 135 MMHG | HEIGHT: 63 IN

## 2017-07-17 DIAGNOSIS — L72.3 SEBACEOUS CYST: Primary | ICD-10-CM

## 2017-07-17 PROCEDURE — 27210995 ZZH RX 272: Performed by: SURGERY

## 2017-07-17 PROCEDURE — 27210794 ZZH OR GENERAL SUPPLY STERILE: Performed by: SURGERY

## 2017-07-17 PROCEDURE — 36000052 ZZH SURGERY LEVEL 2 EA 15 ADDTL MIN: Performed by: SURGERY

## 2017-07-17 PROCEDURE — 88305 TISSUE EXAM BY PATHOLOGIST: CPT | Performed by: SURGERY

## 2017-07-17 PROCEDURE — 88305 TISSUE EXAM BY PATHOLOGIST: CPT | Mod: 26 | Performed by: SURGERY

## 2017-07-17 PROCEDURE — 71000027 ZZH RECOVERY PHASE 2 EACH 15 MINS: Performed by: SURGERY

## 2017-07-17 PROCEDURE — 11421 EXC H-F-NK-SP B9+MARG 0.6-1: CPT | Mod: 59 | Performed by: SURGERY

## 2017-07-17 PROCEDURE — 25000125 ZZHC RX 250: Performed by: SURGERY

## 2017-07-17 PROCEDURE — 36000050 ZZH SURGERY LEVEL 2 1ST 30 MIN: Performed by: SURGERY

## 2017-07-17 PROCEDURE — 40000170 ZZH STATISTIC PRE-PROCEDURE ASSESSMENT II: Performed by: SURGERY

## 2017-07-17 PROCEDURE — S0020 INJECTION, BUPIVICAINE HYDRO: HCPCS | Performed by: SURGERY

## 2017-07-17 PROCEDURE — 11422 EXC H-F-NK-SP B9+MARG 1.1-2: CPT | Performed by: SURGERY

## 2017-07-17 PROCEDURE — 25000128 H RX IP 250 OP 636: Performed by: SURGERY

## 2017-07-17 PROCEDURE — 11423 EXC H-F-NK-SP B9+MARG 2.1-3: CPT | Mod: 59 | Performed by: SURGERY

## 2017-07-17 RX ORDER — HYDROCODONE BITARTRATE AND ACETAMINOPHEN 5; 325 MG/1; MG/1
1-2 TABLET ORAL
Status: DISCONTINUED | OUTPATIENT
Start: 2017-07-17 | End: 2017-07-17 | Stop reason: HOSPADM

## 2017-07-17 RX ORDER — LIDOCAINE HYDROCHLORIDE 10 MG/ML
INJECTION, SOLUTION EPIDURAL; INFILTRATION; INTRACAUDAL; PERINEURAL
Status: DISCONTINUED
Start: 2017-07-17 | End: 2017-07-17 | Stop reason: HOSPADM

## 2017-07-17 RX ORDER — GINSENG 100 MG
CAPSULE ORAL PRN
Status: DISCONTINUED | OUTPATIENT
Start: 2017-07-17 | End: 2017-07-17 | Stop reason: HOSPADM

## 2017-07-17 RX ORDER — ACETAMINOPHEN 325 MG/1
650 TABLET ORAL
Status: DISCONTINUED | OUTPATIENT
Start: 2017-07-17 | End: 2017-07-17 | Stop reason: HOSPADM

## 2017-07-17 RX ORDER — CLINDAMYCIN PHOSPHATE 900 MG/50ML
900 INJECTION, SOLUTION INTRAVENOUS
Status: DISCONTINUED | OUTPATIENT
Start: 2017-07-17 | End: 2017-07-17 | Stop reason: HOSPADM

## 2017-07-17 RX ORDER — CLINDAMYCIN PHOSPHATE 900 MG/50ML
900 INJECTION, SOLUTION INTRAVENOUS SEE ADMIN INSTRUCTIONS
Status: DISCONTINUED | OUTPATIENT
Start: 2017-07-17 | End: 2017-07-17 | Stop reason: HOSPADM

## 2017-07-17 RX ORDER — GINSENG 100 MG
CAPSULE ORAL
Status: DISCONTINUED
Start: 2017-07-17 | End: 2017-07-17 | Stop reason: HOSPADM

## 2017-07-17 RX ORDER — AMOXICILLIN 250 MG
1 CAPSULE ORAL 2 TIMES DAILY PRN
Qty: 20 TABLET | Refills: 0 | Status: SHIPPED | OUTPATIENT
Start: 2017-07-17 | End: 2018-02-28

## 2017-07-17 RX ORDER — HYDROCODONE BITARTRATE AND ACETAMINOPHEN 5; 325 MG/1; MG/1
1 TABLET ORAL EVERY 4 HOURS PRN
Qty: 10 TABLET | Refills: 0 | Status: SHIPPED | OUTPATIENT
Start: 2017-07-17 | End: 2018-02-28

## 2017-07-17 RX ORDER — MAGNESIUM HYDROXIDE 1200 MG/15ML
LIQUID ORAL PRN
Status: DISCONTINUED | OUTPATIENT
Start: 2017-07-17 | End: 2017-07-17 | Stop reason: HOSPADM

## 2017-07-17 RX ORDER — BUPIVACAINE HYDROCHLORIDE 5 MG/ML
INJECTION, SOLUTION EPIDURAL; INTRACAUDAL
Status: DISCONTINUED
Start: 2017-07-17 | End: 2017-07-17 | Stop reason: HOSPADM

## 2017-07-17 RX ADMIN — BACITRACIN 2 G: 500 OINTMENT TOPICAL at 08:31

## 2017-07-17 RX ADMIN — SODIUM CHLORIDE 500 ML: 0.9 IRRIGANT IRRIGATION at 08:00

## 2017-07-17 RX ADMIN — Medication 1 APPLICATOR: at 08:31

## 2017-07-17 RX ADMIN — BUPIVACAINE HYDROCHLORIDE 28 ML GIVEN: 5 INJECTION, SOLUTION EPIDURAL; INTRACAUDAL; PERINEURAL at 08:30

## 2017-07-17 NOTE — IP AVS SNAPSHOT
MRN:[id number]                      After Visit Summary   7/17/2017    Bhavana Castellanos    MRN: [id number]           Thank you!     Thank you for choosing [facility] for your care. Our goal is always to provide you with excellent care. Hearing back from our patients is one way we can continue to improve our services. Please take a few minutes to complete the written survey that you may receive in the mail after you visit with us. Thank you!        Patient Information     Date Of Birth          1975        About your hospital stay     You were admitted on:  July 17, 2017 You last received care in the:  [facility] Day [facility]    You were discharged on:  July 17, 2017       Who to Call     For medical emergencies, please call 911.  For non-urgent questions about your medical care, please call your primary care provider or clinic, [phone]  For questions related to your surgery, please call your surgery clinic        Attending Provider     Provider Specialty    Lori Acosta MD Surgery       Primary Care Provider Office Phone # Fax #    [facility] [phone]      After Care Instructions     Discharge Instructions       Patient to follow up with appointment in next Friday (July 28th)            Do not - immerse incision in water until sutures removed       Do not immerse incision in water until sutures removed            Ice to affected area       Ice to operative site PRN            Shower       May shower Postoperative Day (POD)  2                  Follow-up Appointments     Follow-up and recommended labs and tests        Follow up with Lori shetty, next Friday, July 28th for suture removal. Call [phone] to schedule this appt.                  Further instructions from your care team       Reasons to contact your surgeon:    1. Signs of possible infection: Check your incision daily for redness, swelling, warmth, red streaks or  "foul drainage.   2. Elevated temperature.  3. Pain not controlled with pain medication and/or rest.   4. Uncontrolled nausea or vomiting.  5. Any questions or concerns.      Pending Results     No orders found from 7/15/2017 to 7/18/2017.            Admission Information     Date & Time Provider Department Dept. Phone    7/17/2017 Lori Acosta MD [facility] Same Day Surgery [phone]      Your Vitals Were     Blood Pressure Temperature Respirations Height Weight Last Period    128/78 97  F (36.1  C) (Oral) 16 1.6 m (5' 3\") 83 kg (183 lb) 06/26/2017    Pulse Oximetry BMI (Body Mass Index)                98% 32.42 kg/m2          [employer] Information     [employer] gives you secure access to your electronic health record. If you see a primary care provider, you can also send messages to your care team and make appointments. If you have questions, please call your primary care clinic.  If you do not have a primary care provider, please call [phone] and they will assist you.        Care EveryWhere ID     This is your Care EveryWhere ID. This could be used by other organizations to access your [facility] medical records  [id number]        Equal Access to Services     DHRUV REYES AH: Donald Ryan, wabismark callahan, qabirdie kaalmazonia miller, aníbal white. So [facility] [phone].    ATENCIÓN: Si habla español, tiene a zhu disposición servicios gratuitos de asistencia lingüística. Galen al [phone].    We comply with applicable federal civil rights laws and [place] laws. We do not discriminate on the basis of race, color, national origin, age, disability sex, sexual orientation or gender identity.               Review of your medicines      START taking        Dose / Directions    HYDROcodone-acetaminophen 5-325 MG per tablet   Commonly known as:  NORCO   Used for:  Sebaceous cyst        Dose:  1 tablet   Take 1 tablet by mouth every 4 hours as needed for other " (Moderate to Severe Pain)   Quantity:  10 tablet   Refills:  0       senna-docusate 8.6-50 MG per tablet   Commonly known as:  SENOKOT-S;PERICOLACE   Used for:  Sebaceous cyst        Dose:  1 tablet   Take 1 tablet by mouth 2 times daily as needed for constipation   Quantity:  20 tablet   Refills:  0         CONTINUE these medicines which have NOT CHANGED        Dose / Directions    FISH OIL PO        Dose:  1 g   Take 1 g by mouth daily   Refills:  0       levothyroxine 112 MCG tablet   Commonly known as:  SYNTHROID/LEVOTHROID   Used for:  Acquired hypothyroidism        Dose:  112 mcg   Take 1 tablet (112 mcg) by mouth daily   Quantity:  90 tablet   Refills:  3       MILK THISTLE PO        Dose:  1 tablet   Take 1 tablet by mouth daily   Refills:  0       MULTIVITAMIN CHEW   OR   Used for:  Routine general medical examination at a health care facility        take one per day   Refills:  0       ondansetron 4 MG ODT tab   Commonly known as:  ZOFRAN ODT   Used for:  Abdominal pain, right upper quadrant        Dose:  4-8 mg   Take 1-2 tablets (4-8 mg) by mouth 3 times daily (before meals)   Quantity:  30 tablet   Refills:  1       vitamin D 2000 UNITS tablet   Used for:  Routine general medical examination at a health care facility        one tablet daily   Refills:  0            Where to get your medicines      These medications were sent to [facility] [place], [place] - [address]  [address], [place] [address]     Phone:  [phone]     senna-docusate 8.6-50 MG per tablet         Some of these will need a paper prescription and others can be bought over the counter. Ask your nurse if you have questions.     Bring a paper prescription for each of these medications     HYDROcodone-acetaminophen 5-325 MG per tablet                Protect others around you: Learn how to safely use, store and throw away your medicines at www.disposemymeds.org.             Medication List: This is a list  of all your medications and when to take them. Check marks below indicate your daily home schedule. Keep this list as a reference.      Medications           Morning Afternoon Evening Bedtime As Needed    FISH OIL PO   Take 1 g by mouth daily                                HYDROcodone-acetaminophen 5-325 MG per tablet   Commonly known as:  NORCO   Take 1 tablet by mouth every 4 hours as needed for other (Moderate to Severe Pain)                                levothyroxine 112 MCG tablet   Commonly known as:  SYNTHROID/LEVOTHROID   Take 1 tablet (112 mcg) by mouth daily                                MILK THISTLE PO   Take 1 tablet by mouth daily                                MULTIVITAMIN CHEW   OR   take one per day                                ondansetron 4 MG ODT tab   Commonly known as:  ZOFRAN ODT   Take 1-2 tablets (4-8 mg) by mouth 3 times daily (before meals)                                senna-docusate 8.6-50 MG per tablet   Commonly known as:  SENOKOT-S;PERICOLACE   Take 1 tablet by mouth 2 times daily as needed for constipation                                vitamin D 2000 UNITS tablet   one tablet daily

## 2017-07-17 NOTE — DISCHARGE INSTRUCTIONS
Reasons to contact your surgeon:    1. Signs of possible infection: Check your incision daily for redness, swelling, warmth, red streaks or foul drainage.   2. Elevated temperature.  3. Pain not controlled with pain medication and/or rest.   4. Uncontrolled nausea or vomiting.  5. Any questions or concerns.

## 2017-07-17 NOTE — OP NOTE
General Surgery Operative Note -    Pre-Operative Diagnosis- three soft tissue masses on scalp    Post-Operative Diagnosis- same    Procedure- excision of three soft tissue masses on scalp    Surgeon- Lori Acosta MD    Assistant- None    Anesthesia- 1% lidocaine with epi    Specimen-1. Left posterior scalp mass, 2. Midline posterior scalp mass, 3. Right anterior scalp mass    Procedure  Consent was obtained. A surgical time out was performed. The patient was positioned prone. The patient was prepped and draped in the usual sterile fashion with betadine. Using sterile technique, 1% lidocaine with epinephrine mixed with marcaine was used to infiltrate all areas. After adequate anesthesia was confirmed, a number 15 scalpel was used to make an incision overlying each of the posterior lesions. Using sharp dissection a 1.5 cm x 1.5cm mass was found and circumferentially freed. They were removed in their entirety. There was no bleeding and the wound bed was dry.   The skin was closed in multiple layers with a 3-0 vicryl and 4-0 monocryl for the left posterior incision. The midline incision was closed with 4-0 nylon suture. We then directed our attention the more anterior right sided lesion. This was significantly larger. An ellipse of overlying skin was excised with the mass. The mass was 2cm x2cm and appeared consistent with sebaceous cyst. The mass was removed in its entirety. The deep dermal layer was closed with 3-0 vicryl and skin was closed with interrupted 4-0 nylon. Dermabond was placed over the left posterior incision, bacitracin was applied over the other two incisions.   Patient tolerated the procedure well without complications. All sponge, instrument, and needle counts were correct at the conclusion of the case.      Lori Acosta MD  [facility]  [phone]    Please route or send letter to:  Primary Care Provider (PCP) and Referring Provider

## 2017-07-17 NOTE — IP AVS SNAPSHOT
[facility] Same Day Surgery    [address]    [place] [address]    Phone:  [phone]    Fax:  [phone]                                       After Visit Summary   7/17/2017    Bhavana Castellanos    MRN: [id number]           After Visit Summary Signature Page     I have received my discharge instructions, and my questions have been answered. I have discussed any challenges I see with this plan with the nurse or doctor.    ..........................................................................................................................................  Patient/Patient Representative Signature      ..........................................................................................................................................  Patient Representative Print Name and Relationship to Patient    ..................................................               ................................................  Date                                            Time    ..........................................................................................................................................  Reviewed by Signature/Title    ...................................................              ..............................................  Date                                                            Time

## 2017-07-18 LAB — COPATH REPORT: NORMAL

## 2017-07-28 ENCOUNTER — OFFICE VISIT (OUTPATIENT)
Dept: SURGERY | Facility: CLINIC | Age: 42
End: 2017-07-28
Payer: COMMERCIAL

## 2017-07-28 DIAGNOSIS — Z09 SURGERY FOLLOW-UP: Primary | ICD-10-CM

## 2017-07-28 PROCEDURE — 99024 POSTOP FOLLOW-UP VISIT: CPT | Performed by: SURGERY

## 2017-07-28 NOTE — PROGRESS NOTES
Surgery Postoperative Note    S: Bhavana returns for follow up after excision of three soft tissue masses on her scalp. She is doing well. No drainage from any of the incisions. Minimal pain.     Scalp: anterior right side incision, sutures removed. Scab present which was removed. Right side posterior incision - sutures removed. Healing well. Left side posterior incision - dermabond in place. No erythema or drainage.     Pathology:   A: Soft tissue, left posterior scalp, mass, excision   - Trichilemmal cyst     B: Midline scalp, mass, excision   - Trichilemmal cyst     C: Right anterior scalp, mass, excision   - Trichilemmal cyst     A/P  Bhavana Castellanos is recovering from an excision of three trichelemmal scalp cysts.  I expect she will make a complete recovery without issues. We reviewed her pathology today as well. She will call with questions or concerns.     Thank you for the opportunity to help in her care.    Lori Acosta  [facility]  [phone]    Please route or send letter to:  Primary Care Provider (PCP) and Referring Provider

## 2017-07-28 NOTE — LETTER
2017    Surgery Postoperative Note    RE:  Bhavana Castellanos-:  [birth year]     S: Bhavana returns for follow up after excision of three soft tissue masses on her scalp. She is doing well. No drainage from any of the incisions. Minimal pain.      Scalp: anterior right side incision, sutures removed. Scab present which was removed. Right side posterior incision - sutures removed. Healing well. Left side posterior incision - dermabond in place. No erythema or drainage.      Pathology:   A: Soft tissue, left posterior scalp, mass, excision   - Trichilemmal cyst     B: Midline scalp, mass, excision   - Trichilemmal cyst     C: Right anterior scalp, mass, excision   - Trichilemmal cyst      A/P  Bhavana Castellanos is recovering from an excision of three trichelemmal scalp cysts.  I expect she will make a complete recovery without issues. We reviewed her pathology today as well. She will call with questions or concerns.      Thank you for the opportunity to help in her care.     Lori Acosta  [facility]  [phone]

## 2017-07-28 NOTE — MR AVS SNAPSHOT
After Visit Summary   7/28/2017    Bhavana Castellanos    MRN: [id number]           Patient Information     Date Of Birth          1975        Visit Information        Provider Department      7/28/2017 1:00 PM Lori Acosta MD [facility] [place] [facility] General Surgery      Today's Diagnoses     Surgery follow-up    -  1       Follow-ups after your visit        Who to contact     If you have questions or need follow up information about today's clinic visit or your schedule please contact [facility] directly at [phone].  Normal or non-critical lab and imaging results will be communicated to you by Lanyon[employer], letter or phone within 4 business days after the clinic has received the results. If you do not hear from us within 7 days, please contact the clinic through [employer] or phone. If you have a critical or abnormal lab result, we will notify you by phone as soon as possible.  Submit refill requests through [employer]Pid or call your pharmacy and they will forward the refill request to us. Please allow 3 business days for your refill to be completed.          Additional Information About Your Visit        MyChart Information     [employer] gives you secure access to your electronic health record. If you see a primary care provider, you can also send messages to your care team and make appointments. If you have questions, please call your primary care clinic.  If you do not have a primary care provider, please call [phone] and they will assist you.        Care EveryWhere ID     This is your Care EveryWhere ID. This could be used by other organizations to access your [facility] medical records  [id number]        Your Vitals Were     Last Period                   06/26/2017            Blood Pressure from Last 3 Encounters:   07/17/17 (!) 135/93   06/16/17 130/82   06/12/17 125/77    Weight from Last 3 Encounters:   07/17/17 183 lb (83 kg)   06/16/17 177  lb (80.3 kg)   06/12/17 176 lb (79.8 kg)              Today, you had the following     No orders found for display       Primary Care Provider Office Phone # Fax #    [facility] [phone]       [facility] [address] [place] [address]        Equal Access to Services     [facility] ERIC : Hadii aad ku hadasho Soomaali, waaxda luqadaha, qaybta kaalmada adeegyada, waxay idiin hayaan adeeg kharash ladavid . So [facility] [phone].    ATENCIÓN: Si habla espmarie, tiene a zhu disposición servicios gratuitos de asistencia lingüística. [facility] [phone].    We comply with applicable federal civil rights laws and [place] laws. We do not discriminate on the basis of race, color, national origin, age, disability sex, sexual orientation or gender identity.            Thank you!     Thank you for choosing [facility]  for your care. Our goal is always to provide you with excellent care. Hearing back from our patients is one way we can continue to improve our services. Please take a few minutes to complete the written survey that you may receive in the mail after your visit with us. Thank you!             Your Updated Medication List - Protect others around you: Learn how to safely use, store and throw away your medicines at www.disposemymeds.org.          This list is accurate as of: 7/28/17  1:02 PM.  Always use your most recent med list.                   Brand Name Dispense Instructions for use Diagnosis    FISH OIL PO      Take 1 g by mouth daily        HYDROcodone-acetaminophen 5-325 MG per tablet    NORCO    10 tablet    Take 1 tablet by mouth every 4 hours as needed for other (Moderate to Severe Pain)    Sebaceous cyst       levothyroxine 112 MCG tablet    SYNTHROID/LEVOTHROID    90 tablet    Take 1 tablet (112 mcg) by mouth daily    Acquired hypothyroidism       MILK THISTLE PO      Take 1 tablet by mouth daily        MULTIVITAMIN CHEW   OR       take one per day    Routine general medical examination at a health care facility       ondansetron 4 MG ODT tab    ZOFRAN ODT    30 tablet    Take 1-2 tablets (4-8 mg) by mouth 3 times daily (before meals)    Abdominal pain, right upper quadrant       senna-docusate 8.6-50 MG per tablet    SENOKOT-S;PERICOLACE    20 tablet    Take 1 tablet by mouth 2 times daily as needed for constipation    Sebaceous cyst       vitamin D 2000 UNITS tablet      one tablet daily    Routine general medical examination at a health care facility

## 2018-02-02 ENCOUNTER — VIRTUAL VISIT (OUTPATIENT)
Dept: FAMILY MEDICINE | Facility: OTHER | Age: 43
End: 2018-02-02

## 2018-02-02 NOTE — PROGRESS NOTES
"Date:   Clinician: Pedro Hinojosa  Clinician NPI: [id number]  Patient: Bhavana aCstellanos  Patient : 1975  Patient Address: [address] [place] [address], [place] [address]  Patient Phone: [phone]  Visit Protocol: URI  Patient Summary:  Bhavana is a 42 year old ( : 1975 ) female who initiated a Visit for cold, sinus infection, or influenza. When asked the question \"Please sign me up to receive news, health information and promotions from [employer].\", Bhavana responded \"No\".    Bhavana states her symptoms started gradually 2-3 weeks ago. After her symptoms started, they improved and then got worse again.   Her symptoms consist of malaise, facial pain or pressure, a cough, tooth pain, a headache, nasal congestion, and rhinitis.   Symptom details     Nasal secretions: The color of her mucus is yellow, white, and clear.    Cough: Bhavana coughs every 5-10 minutes and her cough is more bothersome at night. Phlegm comes into her throat when she coughs. She believes the phlegm causes the cough. The color of the phlegm is yellow, white, and clear.     Facial pain or pressure: The facial pain or pressure feels worse when bending over or leaning forward.     Headache: She states the headache is mild (between 1-3 on a 10 point pain scale).     Tooth pain: The tooth pain is not caused by a cavity, recent dental work, or other mouth problems.      Bhavana denies having myalgias, sore throat, chills, fever, wheezing, enlarged lymph nodes, dyspnea, and ear pain. She also denies having a sinus infection within the past year, taking antibiotic medication for the symptoms, and having recent facial or sinus surgery in the past 60 days.   She has not recently been exposed to someone with influenza. Bhavana has not been in close contact with any high risk individuals.   Weight: 180 lbs   Bhavana does not smoke or use smokeless tobacco.   She denies pregnancy and denies breastfeeding. She has menstruated in " the past month.  MEDICATIONS:  Ibuprofen (Advil, Motrin), thyroid, phenylephrine (Sudafed PE), and guaifenesin (Robitussin, Mucinex)  , ALLERGIES:   penicillin/amoxicillin/augmentin    Clinician Response:  Dear Bhavana,  Based on the information you have provided, you likely have  acute bacterial sinusitis, otherwise known as a sinus infection.  I am prescribing an antibiotic Azithromycin to help you feel better. Azithromycin comes as a 250 mg tablet. Swallow two (2) tablets on the first day then one tablet a day for days 2-5. There is no refill with this prescription.   Some people develop allergies to antibiotics. If you have significant swelling or difficulty breathing, stop the medication immediately and call 911 or go to an emergency room. If you notice a new rash, be seen at a clinic or urgent care.  Some women may also develop a yeast infection as a side effect of taking antibiotics. If you notice symptoms of a yeast infection, please use [facility] to get treatment.  If you become pregnant during this course of treatment, stop taking the medication and contact your primary care provider.  You will feel better faster if you take care of yourself by getting more rest and drinking plenty of liquids, especially water.  Remember to wash your hands often and stay home while you are sick to decrease the chance you will spread your infection to others.   Diagnosis: Acute bacterial sinusitis  Diagnosis ICD: J01.90  Prescription: azithromycin (Zithromax) 250mg oral tablet 6 tablets, 5 days supply. Take two tablets by mouth day 1, then 1 tablet days 2-5.. Refills: 0, Refill as needed: no, Allow substitutions: yes  Pharmacy: [id number] - [phone] - [address] [place] [address]

## 2018-02-25 ENCOUNTER — OFFICE VISIT (OUTPATIENT)
Dept: URGENT CARE | Facility: URGENT CARE | Age: 43
End: 2018-02-25
Payer: COMMERCIAL

## 2018-02-25 ENCOUNTER — RADIANT APPOINTMENT (OUTPATIENT)
Dept: GENERAL RADIOLOGY | Facility: CLINIC | Age: 43
End: 2018-02-25
Attending: PHYSICIAN ASSISTANT
Payer: COMMERCIAL

## 2018-02-25 VITALS
HEART RATE: 114 BPM | HEIGHT: 64 IN | DIASTOLIC BLOOD PRESSURE: 81 MMHG | SYSTOLIC BLOOD PRESSURE: 133 MMHG | TEMPERATURE: 99.9 F | BODY MASS INDEX: 30.73 KG/M2 | WEIGHT: 180 LBS | OXYGEN SATURATION: 98 %

## 2018-02-25 DIAGNOSIS — R05.9 COUGH: Primary | ICD-10-CM

## 2018-02-25 DIAGNOSIS — R05.9 COUGH: ICD-10-CM

## 2018-02-25 PROCEDURE — 99213 OFFICE O/P EST LOW 20 MIN: CPT | Performed by: PHYSICIAN ASSISTANT

## 2018-02-25 PROCEDURE — 71046 X-RAY EXAM CHEST 2 VIEWS: CPT

## 2018-02-25 NOTE — PROGRESS NOTES
"SUBJECTIVE:   Bhavana Castellanos is a 42 year old female presenting with a chief complaint of fever, cough - productive, sore throat and fatigue.  Onset of symptoms was 5 day(s) ago.  Course of illness is same.    Severity moderate  Current and Associated symptoms: wheezing  Treatment measures tried include Tylenol/Ibuprofen, Decongestants and OTC Cough med.  Predisposing factors include HX of asthma.    Past Medical History:   Diagnosis Date     Depressive disorder 1994     Hypothyroidism      Current Outpatient Prescriptions   Medication Sig Dispense Refill     HYDROcodone-acetaminophen (NORCO) 5-325 MG per tablet Take 1 tablet by mouth every 4 hours as needed for other (Moderate to Severe Pain) (Patient not taking: Reported on 7/28/2017) 10 tablet 0     senna-docusate (SENOKOT-S;PERICOLACE) 8.6-50 MG per tablet Take 1 tablet by mouth 2 times daily as needed for constipation 20 tablet 0     Omega-3 Fatty Acids (FISH OIL PO) Take 1 g by mouth daily        MILK THISTLE PO Take 1 tablet by mouth daily        levothyroxine (SYNTHROID/LEVOTHROID) 112 MCG tablet Take 1 tablet (112 mcg) by mouth daily 90 tablet 3     ondansetron (ZOFRAN ODT) 4 MG ODT tab Take 1-2 tablets (4-8 mg) by mouth 3 times daily (before meals) 30 tablet 1     MULTIVITAMIN CHEW   OR take one per day       VITAMIN D 2000 UNIT OR TABS one tablet daily       Social History   Substance Use Topics     Smoking status: Never Smoker     Smokeless tobacco: Never Used     Alcohol use Yes      Comment: once per weeks        ROS:  CONSTITUTIONAL:POSITIVE  for fever   EYES: NEGATIVE for vision changes or irritation  ENT/MOUTH: nasal congestion  RESP:cough-productive      OBJECTIVE:    /81  Pulse 114  Temp 99.9  F (37.7  C) (Tympanic)  Ht 5' 4\" (1.626 m)  Wt 180 lb (81.6 kg)  LMP 01/15/2018  SpO2 98%  BMI 30.9 kg/m2      GENERAL APPEARANCE: healthy, alert and no distress  EYES: EOMI,  PERRL, conjunctiva clear  HENT: ear canals and TM's normal.  Nose " and mouth without ulcers, erythema or lesions  NECK: supple, nontender, no lymphadenopathy  RESP: lungs clear to auscultation - no rales, rhonchi or wheezes  CV: regular rates and rhythm, normal S1 S2, no murmur noted  ABDOMEN:  soft, nontender, no HSM or masses and bowel sounds normal  NEURO: Normal strength and tone, sensory exam grossly normal,  normal speech and mentation  SKIN: no suspicious lesions or rashes    Chest xray: negative    ASSESSMENT:    1. Cough   probably viral syndrome   - XR Chest 2 Views; Future      PLAN:  Ibuprofen, Fluids, Rest, OTC cough suppressant/expectorant, OTC decongestant/antihistamine and Vaporizer. Follow up in clinic next week if not improving.   See orders in Epic

## 2018-02-25 NOTE — MR AVS SNAPSHOT
"              After Visit Summary   2/25/2018    Bhavana Castellanos    MRN: [id number]           Patient Information     Date Of Birth          1975        Visit Information        Provider Department      2/25/2018 3:35 PM Walter Garrison PA-C [facility]        Today's Diagnoses     Cough    -  1       Follow-ups after your visit        Who to contact     If you have questions or need follow up information about today's clinic visit or your schedule please contact [facility] directly at [phone].  Normal or non-critical lab and imaging results will be communicated to you by [email or web address], letter or phone within 4 business days after the clinic has received the results. If you do not hear from us within 7 days, please contact the clinic through Meicant or phone. If you have a critical or abnormal lab result, we will notify you by phone as soon as possible.  Submit refill requests through Stylr or call your pharmacy and they will forward the refill request to us. Please allow 3 business days for your refill to be completed.          Additional Information About Your Visit        MyChart Information     Stylr gives you secure access to your electronic health record. If you see a primary care provider, you can also send messages to your care team and make appointments. If you have questions, please call your primary care clinic.  If you do not have a primary care provider, please call [phone] and they will assist you.        Care EveryWhere ID     This is your Care EveryWhere ID. This could be used by other organizations to access your [place] medical records  [id number]        Your Vitals Were     Pulse Temperature Height Last Period Pulse Oximetry BMI (Body Mass Index)    114 99.9  F (37.7  C) (Tympanic) 5' 4\" (1.626 m) 01/15/2018 98% 30.9 kg/m2       Blood Pressure from Last 3 Encounters:   02/25/18 133/81   07/17/17 (!) 135/93   06/16/17 130/82    Weight " from Last 3 Encounters:   02/25/18 180 lb (81.6 kg)   07/17/17 183 lb (83 kg)   06/16/17 177 lb (80.3 kg)               Primary Care Provider Office Phone # Fax #    [place]AND Mccloud [phone] [address] [place] [address]        Equal Access to Services     DHRUV REYES : Hadii aad ku hadasho Soomaali, waaxda luqadaha, qaybta kaalmada adeegyada, waxay idiin hayaan adeeg kharash la'aan ah. So [facility] [phone].    ATENCIÓN: Si gerald martinez, tiene a zhu disposición servicios gratuitos de asistencia lingüística. Llame al [phone].    We comply with applicable federal civil rights laws and [place] laws. We do not discriminate on the basis of race, color, national origin, age, disability, sex, sexual orientation, or gender identity.            Thank you!     Thank you for choosing [facility]  for your care. Our goal is always to provide you with excellent care. Hearing back from our patients is one way we can continue to improve our services. Please take a few minutes to complete the written survey that you may receive in the mail after your visit with us. Thank you!             Your Updated Medication List - Protect others around you: Learn how to safely use, store and throw away your medicines at www.disposemymeds.org.          This list is accurate as of 2/25/18  5:27 PM.  Always use your most recent med list.                   Brand Name Dispense Instructions for use Diagnosis    dextromethorphan-guaiFENesin  MG per 12 hr tablet    MUCINEX DM     Take 1 tablet by mouth every 12 hours        FISH OIL PO      Take 1 g by mouth daily        guaiFENesin 100 MG/5ML Syrp    ROBITUSSIN     Take 10 mLs by mouth every 4 hours as needed for cough        HYDROcodone-acetaminophen 5-325 MG per tablet    NORCO    10 tablet    Take 1 tablet by mouth every 4 hours as needed for other (Moderate to Severe Pain)    Sebaceous cyst        levothyroxine 112 MCG tablet    SYNTHROID/LEVOTHROID    90 tablet    Take 1 tablet (112 mcg) by mouth daily    Acquired hypothyroidism       MILK THISTLE PO      Take 1 tablet by mouth daily        MULTIVITAMIN CHEW   OR      take one per day    Routine general medical examination at a health care facility       ondansetron 4 MG ODT tab    ZOFRAN ODT    30 tablet    Take 1-2 tablets (4-8 mg) by mouth 3 times daily (before meals)    Abdominal pain, right upper quadrant       senna-docusate 8.6-50 MG per tablet    SENOKOT-S;PERICOLACE    20 tablet    Take 1 tablet by mouth 2 times daily as needed for constipation    Sebaceous cyst       SUDAFED PO           vitamin D 2000 UNITS tablet      one tablet daily    Routine general medical examination at a health care facility

## 2018-02-25 NOTE — NURSING NOTE
"Chief Complaint   Patient presents with     Urgent Care     Pt in clinic to have eval for cough lasting 1 week.     Cough       Initial /81  Pulse 114  Temp 99.9  F (37.7  C) (Tympanic)  Ht 5' 4\" (1.626 m)  Wt 180 lb (81.6 kg)  LMP 01/15/2018  SpO2 98%  BMI 30.9 kg/m2 Estimated body mass index is 30.9 kg/(m^2) as calculated from the following:    Height as of this encounter: 5' 4\" (1.626 m).    Weight as of this encounter: 180 lb (81.6 kg).  Medication Reconciliation: complete   Nilda Garrison/ MA    "

## 2018-02-28 ENCOUNTER — OFFICE VISIT (OUTPATIENT)
Dept: FAMILY MEDICINE | Facility: CLINIC | Age: 43
End: 2018-02-28
Payer: COMMERCIAL

## 2018-02-28 VITALS
TEMPERATURE: 97.5 F | SYSTOLIC BLOOD PRESSURE: 130 MMHG | HEART RATE: 96 BPM | HEIGHT: 63 IN | RESPIRATION RATE: 18 BRPM | OXYGEN SATURATION: 98 % | WEIGHT: 182 LBS | BODY MASS INDEX: 32.25 KG/M2 | DIASTOLIC BLOOD PRESSURE: 83 MMHG

## 2018-02-28 DIAGNOSIS — J03.90 TONSILLITIS: Primary | ICD-10-CM

## 2018-02-28 DIAGNOSIS — R07.0 THROAT PAIN: ICD-10-CM

## 2018-02-28 LAB
DEPRECATED S PYO AG THROAT QL EIA: NORMAL
HETEROPH AB SER QL: NEGATIVE
SPECIMEN SOURCE: NORMAL

## 2018-02-28 PROCEDURE — 99213 OFFICE O/P EST LOW 20 MIN: CPT | Performed by: FAMILY MEDICINE

## 2018-02-28 PROCEDURE — 87880 STREP A ASSAY W/OPTIC: CPT | Performed by: FAMILY MEDICINE

## 2018-02-28 PROCEDURE — 87081 CULTURE SCREEN ONLY: CPT | Performed by: FAMILY MEDICINE

## 2018-02-28 PROCEDURE — 36415 COLL VENOUS BLD VENIPUNCTURE: CPT | Performed by: FAMILY MEDICINE

## 2018-02-28 PROCEDURE — 86308 HETEROPHILE ANTIBODY SCREEN: CPT | Performed by: FAMILY MEDICINE

## 2018-02-28 RX ORDER — LURASIDONE HYDROCHLORIDE 20 MG/1
20 TABLET, FILM COATED ORAL DAILY
Qty: 90 TABLET | Refills: 0 | Status: SHIPPED | OUTPATIENT
Start: 2018-02-28 | End: 2018-02-28

## 2018-02-28 RX ORDER — AZITHROMYCIN 250 MG/1
TABLET, FILM COATED ORAL
Qty: 6 TABLET | Refills: 0 | Status: SHIPPED | OUTPATIENT
Start: 2018-02-28 | End: 2018-06-15

## 2018-02-28 ASSESSMENT — ANXIETY QUESTIONNAIRES
5. BEING SO RESTLESS THAT IT IS HARD TO SIT STILL: NOT AT ALL
6. BECOMING EASILY ANNOYED OR IRRITABLE: NOT AT ALL
GAD7 TOTAL SCORE: 7
2. NOT BEING ABLE TO STOP OR CONTROL WORRYING: MORE THAN HALF THE DAYS
3. WORRYING TOO MUCH ABOUT DIFFERENT THINGS: MORE THAN HALF THE DAYS
7. FEELING AFRAID AS IF SOMETHING AWFUL MIGHT HAPPEN: SEVERAL DAYS
1. FEELING NERVOUS, ANXIOUS, OR ON EDGE: SEVERAL DAYS

## 2018-02-28 ASSESSMENT — PATIENT HEALTH QUESTIONNAIRE - PHQ9: 5. POOR APPETITE OR OVEREATING: SEVERAL DAYS

## 2018-02-28 NOTE — MR AVS SNAPSHOT
After Visit Summary   2/28/2018    Bhavana Castellanos    MRN: [id number]           Patient Information     Date Of Birth          1975        Visit Information        Provider Department      2/28/2018 10:40 AM Sharonda Lemus MD [facility]        Today's Diagnoses     Throat pain    -  1    Mood disorder (H)           Follow-ups after your visit        Additional Services     MENTAL HEALTH REFERRAL  - Adult; Psychiatry and Medication Management; Psychiatry; [facility]: Collaborative Care Psychiatry Service   [place], [place] [phone]  Medication management & future refills will be returned to [facility] PCP upon compl...       All scheduling is subject to the client's specific insurance plan & benefits, provider/location availability, and provider clinical specialities.  Please arrive 15 minutes early for your first appointment and bring your completed paperwork.    Please be aware that coverage of these services is subject to the terms and limitations of your health insurance plan.  Call member services at your health plan with any benefit or coverage questions.                            Who to contact     If you have questions or need follow up information about today's clinic visit or your schedule please contact [facility] directly at [phone].  Normal or non-critical lab and imaging results will be communicated to you by MyChart, letter or phone within 4 business days after the clinic has received the results. If you do not hear from us within 7 days, please contact the clinic through MyChart or phone. If you have a critical or abnormal lab result, we will notify you by phone as soon as possible.  Submit refill requests through [employer] or call your pharmacy and they will forward the refill request to us. Please allow 3 business days for your refill to be completed.          Additional Information About Your Visit        MyChart Information      "[employer] gives you secure access to your electronic health record. If you see a primary care provider, you can also send messages to your care team and make appointments. If you have questions, please call your primary care clinic.  If you do not have a primary care provider, please call [phone] and they will assist you.        Care EveryWhere ID     This is your Care EveryWhere ID. This could be used by other organizations to access your [place] medical records  [id number]        Your Vitals Were     Pulse Temperature Respirations Height Pulse Oximetry BMI (Body Mass Index)    96 97.5  F (36.4  C) (Oral) 18 5' 3.25\" (1.607 m) 98% 31.99 kg/m2       Blood Pressure from Last 3 Encounters:   02/28/18 130/83   02/25/18 133/81   07/17/17 (!) 135/93    Weight from Last 3 Encounters:   02/28/18 182 lb (82.6 kg)   02/25/18 180 lb (81.6 kg)   07/17/17 183 lb (83 kg)              We Performed the Following     Beta strep group A [facility]     MENTAL HEALTH REFERRAL  - Adult; Psychiatry and Medication Management; Psychiatry; [facility]: Collaborative Care Psychiatry Service   [place], [place] [phone]  Medication management & future refills will be returned to [facility] PCP upon compl...     Mononucleosis screen     Strep, Rapid Screen          Today's Medication Changes          These changes are accurate as of 2/28/18 11:44 AM.  If you have any questions, ask your nurse or doctor.               Start taking these medicines.        Dose/Directions    azithromycin 250 MG tablet   Commonly known as:  ZITHROMAX   Used for:  Throat pain   Started by:  Sharonda Lemus MD        Two tablets first day, then one tablet daily for four days.   Quantity:  6 tablet   Refills:  0       lurasidone 20 MG Tabs tablet   Commonly known as:  LATUDA   Used for:  Mood disorder (H)   Started by:  Sharonda Lemus MD        Dose:  20 mg   Take 1 tablet (20 mg) by mouth daily   Quantity:  90 tablet   Refills:  0         Stop taking " these medicines if you haven't already. Please contact your care team if you have questions.     guaiFENesin 100 MG/5ML Syrp   Commonly known as:  ROBITUSSIN   Stopped by:  Sharonda Lemus MD           HYDROcodone-acetaminophen 5-325 MG per tablet   Commonly known as:  NORCO   Stopped by:  Sharonda Lemus MD           ondansetron 4 MG ODT tab   Commonly known as:  ZOFRAN ODT   Stopped by:  Sharonda Lemus MD           senna-docusate 8.6-50 MG per tablet   Commonly known as:  SENOKOT-S;PERICOLACE   Stopped by:  Sharonda Lemus MD           SUDAFED PO   Stopped by:  Sharonda Lemus MD                Where to get your medicines      These medications were sent to [facility] - [place], [place] - [address]  [address], [place] [address]     Phone:  [phone]     azithromycin 250 MG tablet    lurasidone 20 MG Tabs tablet                Primary Care Provider Office Phone # Fax #    Ana Rosales, [place] [facility] [phone]       [address] [place] [address]        Equal Access to Services     DHRUV REYES AH: Hadii darryl cullen hadasho Soomaali, waaxda luqadaha, qaybta kaalmada adeegyada, aníbal winston hayvikkin jj white. So [facility] [phone].    ATENCIÓN: Si habla español, tiene a zhu disposición servicios gratuitos de asistencia lingüística. Llame al [phone].    We comply with applicable federal civil rights laws and [place] laws. We do not discriminate on the basis of race, color, national origin, age, disability, sex, sexual orientation, or gender identity.            Thank you!     Thank you for choosing [facility]  for your care. Our goal is always to provide you with excellent care. Hearing back from our patients is one way we can continue to improve our services. Please take a few minutes to complete the written survey that you may receive in the mail after your visit with us. Thank you!             Your  Updated Medication List - Protect others around you: Learn how to safely use, store and throw away your medicines at www.disposemymeds.org.          This list is accurate as of 2/28/18 11:44 AM.  Always use your most recent med list.                   Brand Name Dispense Instructions for use Diagnosis    azithromycin 250 MG tablet    ZITHROMAX    6 tablet    Two tablets first day, then one tablet daily for four days.    Throat pain       dextromethorphan-guaiFENesin  MG per 12 hr tablet    MUCINEX DM     Take 1 tablet by mouth every 12 hours        FISH OIL PO      Take 1 g by mouth daily        levothyroxine 112 MCG tablet    SYNTHROID/LEVOTHROID    90 tablet    Take 1 tablet (112 mcg) by mouth daily    Acquired hypothyroidism       lurasidone 20 MG Tabs tablet    LATUDA    90 tablet    Take 1 tablet (20 mg) by mouth daily    Mood disorder (H)       MILK THISTLE PO      Take 1 tablet by mouth daily        MULTIVITAMIN CHEW   OR      take one per day    Routine general medical examination at a health care facility       vitamin D 2000 UNITS tablet      one tablet daily    Routine general medical examination at a health care facility

## 2018-02-28 NOTE — PROGRESS NOTES
HPI      ROS      Physical Exam      SUBJECTIVE:   Bhavana Castellanos is a 42 year old female who presents to clinic today for the following health issues:    ED/UC Followup:    Facility:  [facility]   Date of visit: 2/25/2108  Reason for visit: cough and sore throat  Current Status: cough is much better, but still have sore throat      Illness started 8 days ago with fever, sore throat and cough.  No myalgias or n/v/d.  She was seen in UC after 5 days of illness where CXR was negative and she was advised to follow conservative home cares.  Since then, her cough and chest congestion are clearing up, but she is still having low grade temps  which come down with dayquil.  She is still having severe sore throat.  She did have post-tussive emesis last night, otherwise no n/v/d.  No abdominal pain, joint pain or rash.  Denies SOB or chest pain.      She sat next to someone who was sick on an airplane before this started.     Tdap is up to date.         Problem list and histories reviewed & adjusted, as indicated.  Additional history: as documented    Patient Active Problem List   Diagnosis     Acquired hypothyroidism     CARDIOVASCULAR SCREENING; LDL GOAL LESS THAN 160     Joint pain     Moderate major depression (H)     Vitamin D deficiency disease     Past Surgical History:   Procedure Laterality Date     ENT SURGERY      wisdom teeth removal     EXCISE LESION HEAD N/A 7/17/2017    Procedure: EXCISE LESION HEAD;  (LOCAL) EXCISION OF RIGHT SCALP MASS AND LEFT POSTERIOR SCALP MASS x2;  Surgeon: Lori Acosta MD;  Location: [facility]     HEAD & NECK SURGERY  7/2018    cysts removed from scalp       Social History   Substance Use Topics     Smoking status: Never Smoker     Smokeless tobacco: Never Used     Alcohol use Yes      Comment: once per weeks      Family History   Problem Relation Age of Onset     CANCER Father      kidney cancer: in remission     Other Cancer Father      Kidney     Depression Father   "    Other Cancer Maternal Grandmother      Bladder     Thyroid Disease Maternal Grandmother      Hypertension Maternal Grandfather      DIABETES Maternal Grandfather      Other Cancer Paternal Grandfather      Hypertension Brother      DIABETES Brother          Current Outpatient Prescriptions   Medication Sig Dispense Refill     dextromethorphan-guaiFENesin (MUCINEX DM)  MG per 12 hr tablet Take 1 tablet by mouth every 12 hours       Omega-3 Fatty Acids (FISH OIL PO) Take 1 g by mouth daily        MILK THISTLE PO Take 1 tablet by mouth daily        levothyroxine (SYNTHROID/LEVOTHROID) 112 MCG tablet Take 1 tablet (112 mcg) by mouth daily 90 tablet 3     MULTIVITAMIN CHEW   OR take one per day       VITAMIN D 2000 UNIT OR TABS one tablet daily       Allergies   Allergen Reactions     Amoxicillin Rash       Reviewed and updated as needed this visit by clinical staff  Tobacco  Allergies  Meds  Med Hx  Surg Hx  Fam Hx  Soc Hx      Reviewed and updated as needed this visit by Provider         ROS:  Constitutional, HEENT, cardiovascular, pulmonary, gi and gu systems are negative, except as otherwise noted.    OBJECTIVE:     /83 (BP Location: Left arm, Patient Position: Sitting, Cuff Size: Adult Regular)  Pulse 96  Temp 97.5  F (36.4  C) (Oral)  Resp 18  Ht 5' 3.25\" (1.607 m)  Wt 182 lb (82.6 kg)  SpO2 98%  BMI 31.99 kg/m2  Body mass index is 31.99 kg/(m^2).  GENERAL APPEARANCE: healthy, alert and no distress  EYES: Eyes grossly normal to inspection, PERRL and conjunctivae and sclerae normal  HENT: ear canals and TM's normal and nose, severe tonsillary erythema, hypertrophy and there is bilateral exudate.  Uvula is midline, no appearance of abscess.  NECK: cervical LAD, no asymmetry, masses, or scars and thyroid normal to palpation  RESP: lungs clear to auscultation - no rales, rhonchi or wheezes  CV: regular rates and rhythm, normal S1 S2, no S3 or S4 and no murmur, click or rub  PSYCH: mentation " appears normal and affect normal/bright    Diagnostic Test Results:  Results for orders placed or performed in visit on 02/28/18 (from the past 24 hour(s))   Strep, Rapid Screen   Result Value Ref Range    Specimen Description Throat     Rapid Strep A Screen       NEGATIVE: No Group A streptococcal antigen detected by immunoassay, await culture report.   Mononucleosis screen   Result Value Ref Range    Mononucleosis Screen Negative NEG^Negative       ASSESSMENT/PLAN:     1. Tonsillitis  Her rapid strep is negative, will f/u on culture.  Mono negative.  Given ongoing low grade fevers and appearance on exam, I discussed treating empirically with antibiotics.  She is pcn allergic, rx done for azithromycin.  F/u if fevers persist, or if symptoms worsen.  Salt water gargle, over the counter tylenol/ibuprofen as needed.    - Strep, Rapid Screen  - Beta strep group A culture  - Mononucleosis screen  - azithromycin (ZITHROMAX) 250 MG tablet; Two tablets first day, then one tablet daily for four days.  Dispense: 6 tablet; Refill: 0        Sharonda Lemus MD  [facility]

## 2018-03-01 ENCOUNTER — TELEPHONE (OUTPATIENT)
Dept: FAMILY MEDICINE | Facility: CLINIC | Age: 43
End: 2018-03-01

## 2018-03-01 LAB
BACTERIA SPEC CULT: NORMAL
SPECIMEN SOURCE: NORMAL

## 2018-03-01 ASSESSMENT — PATIENT HEALTH QUESTIONNAIRE - PHQ9: SUM OF ALL RESPONSES TO PHQ QUESTIONS 1-9: 14

## 2018-03-01 ASSESSMENT — ANXIETY QUESTIONNAIRES: GAD7 TOTAL SCORE: 7

## 2018-03-01 NOTE — TELEPHONE ENCOUNTER
Reason for Call:  Form, our goal is to have forms completed with 72 hours, however, some forms may require a visit or additional information.    Type of letter, form or note:  disability    Who is the form from?: Patient    Where did the form come from: Patient or family brought in       What clinic location was the form placed at?: [facility]    Where the form was placed: Given to physician    What number is listed as a contact on the form?: [phone]       Additional comments: fax from to [phone] and send to abstraction please. Form on Dr. Lemus's desk.    Call taken on 3/1/2018 at 1:02 PM by Charo Bai

## 2018-04-28 DIAGNOSIS — Z12.31 VISIT FOR SCREENING MAMMOGRAM: ICD-10-CM

## 2018-04-28 PROCEDURE — 77067 SCR MAMMO BI INCL CAD: CPT | Mod: TC

## 2018-06-15 ENCOUNTER — OFFICE VISIT (OUTPATIENT)
Dept: FAMILY MEDICINE | Facility: CLINIC | Age: 43
End: 2018-06-15
Payer: COMMERCIAL

## 2018-06-15 VITALS
WEIGHT: 194.6 LBS | BODY MASS INDEX: 34.48 KG/M2 | DIASTOLIC BLOOD PRESSURE: 72 MMHG | HEIGHT: 63 IN | RESPIRATION RATE: 20 BRPM | HEART RATE: 86 BPM | SYSTOLIC BLOOD PRESSURE: 125 MMHG | OXYGEN SATURATION: 98 % | TEMPERATURE: 97.8 F

## 2018-06-15 DIAGNOSIS — Z12.4 SCREENING FOR MALIGNANT NEOPLASM OF CERVIX: ICD-10-CM

## 2018-06-15 DIAGNOSIS — M25.50 ARTHRALGIA, UNSPECIFIED JOINT: ICD-10-CM

## 2018-06-15 DIAGNOSIS — R53.83 FATIGUE, UNSPECIFIED TYPE: ICD-10-CM

## 2018-06-15 DIAGNOSIS — F32.1 MODERATE MAJOR DEPRESSION (H): ICD-10-CM

## 2018-06-15 DIAGNOSIS — Z12.39 BREAST CANCER SCREENING: ICD-10-CM

## 2018-06-15 DIAGNOSIS — Z01.411 ENCOUNTER FOR GYNECOLOGICAL EXAMINATION WITH ABNORMAL FINDING: Primary | ICD-10-CM

## 2018-06-15 DIAGNOSIS — E03.9 ACQUIRED HYPOTHYROIDISM: ICD-10-CM

## 2018-06-15 LAB
ALBUMIN SERPL-MCNC: 3.9 G/DL (ref 3.4–5)
ALP SERPL-CCNC: 65 U/L (ref 40–150)
ALT SERPL W P-5'-P-CCNC: 36 U/L (ref 0–50)
ANION GAP SERPL CALCULATED.3IONS-SCNC: 11 MMOL/L (ref 3–14)
AST SERPL W P-5'-P-CCNC: 20 U/L (ref 0–45)
BILIRUB SERPL-MCNC: 0.5 MG/DL (ref 0.2–1.3)
BUN SERPL-MCNC: 9 MG/DL (ref 7–30)
CALCIUM SERPL-MCNC: 9 MG/DL (ref 8.5–10.1)
CHLORIDE SERPL-SCNC: 109 MMOL/L (ref 94–109)
CHOLEST SERPL-MCNC: 135 MG/DL
CO2 SERPL-SCNC: 22 MMOL/L (ref 20–32)
CREAT SERPL-MCNC: 0.68 MG/DL (ref 0.52–1.04)
DEPRECATED CALCIDIOL+CALCIFEROL SERPL-MC: 19 UG/L (ref 20–75)
GFR SERPL CREATININE-BSD FRML MDRD: >90 ML/MIN/1.7M2
GLUCOSE SERPL-MCNC: 88 MG/DL (ref 70–99)
HDLC SERPL-MCNC: 51 MG/DL
HGB BLD-MCNC: 14.2 G/DL (ref 11.7–15.7)
LDLC SERPL CALC-MCNC: 67 MG/DL
NONHDLC SERPL-MCNC: 84 MG/DL
POTASSIUM SERPL-SCNC: 4.2 MMOL/L (ref 3.4–5.3)
PROT SERPL-MCNC: 7.4 G/DL (ref 6.8–8.8)
SODIUM SERPL-SCNC: 142 MMOL/L (ref 133–144)
TRIGL SERPL-MCNC: 87 MG/DL
TSH SERPL DL<=0.005 MIU/L-ACNC: 1.28 MU/L (ref 0.4–4)

## 2018-06-15 PROCEDURE — 36415 COLL VENOUS BLD VENIPUNCTURE: CPT | Performed by: NURSE PRACTITIONER

## 2018-06-15 PROCEDURE — G0145 SCR C/V CYTO,THINLAYER,RESCR: HCPCS | Performed by: NURSE PRACTITIONER

## 2018-06-15 PROCEDURE — 80061 LIPID PANEL: CPT | Performed by: NURSE PRACTITIONER

## 2018-06-15 PROCEDURE — 87624 HPV HI-RISK TYP POOLED RSLT: CPT | Performed by: NURSE PRACTITIONER

## 2018-06-15 PROCEDURE — 80053 COMPREHEN METABOLIC PANEL: CPT | Performed by: NURSE PRACTITIONER

## 2018-06-15 PROCEDURE — 99396 PREV VISIT EST AGE 40-64: CPT | Performed by: NURSE PRACTITIONER

## 2018-06-15 PROCEDURE — 85018 HEMOGLOBIN: CPT | Performed by: NURSE PRACTITIONER

## 2018-06-15 PROCEDURE — 84443 ASSAY THYROID STIM HORMONE: CPT | Performed by: NURSE PRACTITIONER

## 2018-06-15 PROCEDURE — 82306 VITAMIN D 25 HYDROXY: CPT | Performed by: NURSE PRACTITIONER

## 2018-06-15 RX ORDER — LEVOTHYROXINE SODIUM 112 UG/1
112 TABLET ORAL DAILY
Qty: 90 TABLET | Refills: 3 | Status: SHIPPED | OUTPATIENT
Start: 2018-06-15 | End: 2019-05-24

## 2018-06-15 ASSESSMENT — PATIENT HEALTH QUESTIONNAIRE - PHQ9
SUM OF ALL RESPONSES TO PHQ QUESTIONS 1-9: 12
10. IF YOU CHECKED OFF ANY PROBLEMS, HOW DIFFICULT HAVE THESE PROBLEMS MADE IT FOR YOU TO DO YOUR WORK, TAKE CARE OF THINGS AT HOME, OR GET ALONG WITH OTHER PEOPLE: SOMEWHAT DIFFICULT
SUM OF ALL RESPONSES TO PHQ QUESTIONS 1-9: 12

## 2018-06-15 NOTE — LETTER
My Depression Action Plan  Name: Bhavana Castellanos   Date of Birth 1975  Date: 6/15/2018    My doctor: Ana Rosales   My clinic: [address] [address]  [phone]          GREEN    ZONE   Good Control    What it looks like:     Things are going generally well. You have normal up s and down s. You may even feel depressed from time to time, but bad moods usually last less than a day.   What you need to do:  1. Continue to care for yourself (see self care plan)  2. Check your depression survival kit and update it as needed  3. Follow your physician s recommendations including any medication.  4. Do not stop taking medication unless you consult with your physician first.           YELLOW         ZONE Getting Worse    What it looks like:     Depression is starting to interfere with your life.     It may be hard to get out of bed; you may be starting to isolate yourself from others.    Symptoms of depression are starting to last most all day and this has happened for several days.     You may have suicidal thoughts but they are not constant.   What you need to do:     1. Call your care team, your response to treatment will improve if you keep your care team informed of your progress. Yellow periods are signs an adjustment may need to be made.     2. Continue your self-care, even if you have to fake it!    3. Talk to someone in your support network    4. Open up your depression survival kit           RED    ZONE Medical Alert - Get Help    What it looks like:     Depression is seriously interfering with your life.     You may experience these or other symptoms: You can t get out of bed most days, can t work or engage in other necessary activities, you have trouble taking care of basic hygiene, or basic responsibilities, thoughts of suicide or death that will not go away, self-injurious behavior.     What you need to do:  1. Call your care team  and request a same-day appointment. If they are not available (weekends or after hours) call your local crisis line, emergency room or 911.            Depression Self Care Plan / Survival Kit    Self-Care for Depression  Here s the deal. Your body and mind are really not as separate as most people think.  What you do and think affects how you feel and how you feel influences what you do and think. This means if you do things that people who feel good do, it will help you feel better.  Sometimes this is all it takes.  There is also a place for medication and therapy depending on how severe your depression is, so be sure to consult with your medical provider and/ or Behavioral Health Consultant if your symptoms are worsening or not improving.     In order to better manage my stress, I will:    Exercise  Get some form of exercise, every day. This will help reduce pain and release endorphins, the  feel good  chemicals in your brain. This is almost as good as taking antidepressants!  This is not the same as joining a gym and then never going! (they count on that by the way ) It can be as simple as just going for a walk or doing some gardening, anything that will get you moving.      Hygiene   Maintain good hygiene (Get out of bed in the morning, Make your bed, Brush your teeth, Take a shower, and Get dressed like you were going to work, even if you are unemployed).  If your clothes don't fit try to get ones that do.    Diet  I will strive to eat foods that are good for me, drink plenty of water, and avoid excessive sugar, caffeine, alcohol, and other mood-altering substances.  Some foods that are helpful in depression are: complex carbohydrates, B vitamins, flaxseed, fish or fish oil, fresh fruits and vegetables.    Psychotherapy  I agree to participate in Individual Therapy (if recommended).    Medication  If prescribed medications, I agree to take them.  Missing doses can result in serious side effects.  I understand  that drinking alcohol, or other illicit drug use, may cause potential side effects.  I will not stop my medication abruptly without first discussing it with my provider.    Staying Connected With Others  I will stay in touch with my friends, family members, and my primary care provider/team.    Use your imagination  Be creative.  We all have a creative side; it doesn t matter if it s oil painting, sand castles, or mud pies! This will also kick up the endorphins.    Witness Beauty  (AKA stop and smell the roses) Take a look outside, even in mid-winter. Notice colors, textures. Watch the squirrels and birds.     Service to others  Be of service to others.  There is always someone else in need.  By helping others we can  get out of ourselves  and remember the really important things.  This also provides opportunities for practicing all the other parts of the program.    Humor  Laugh and be silly!  Adjust your TV habits for less news and crime-drama and more comedy.    Control your stress  Try breathing deep, massage therapy, biofeedback, and meditation. Find time to relax each day.     My support system    Clinic Contact:  Phone number:    Contact 1:  Phone number:    Contact 2:  Phone number:    [place]/:  Phone number:    Therapist:  Phone number:    Local crisis center:    Phone number:    Other community support:  Phone number:

## 2018-06-15 NOTE — PROGRESS NOTES
SUBJECTIVE:   CC: Bhavana Castellanos is an 42 year old woman who presents for preventive health visit.     Physical   Annual:     Getting at least 3 servings of Calcium per day::  Yes    Bi-annual eye exam::  Yes    Dental care twice a year::  Yes    Sleep apnea or symptoms of sleep apnea::  None    Diet::  Regular (no restrictions)    Frequency of exercise::  2-3 days/week    Duration of exercise::  30-45 minutes    Taking medications regularly::  Yes    Medication side effects::  None                  Today's PHQ-2 Score:   PHQ-2 ( 1999 [employer]) 6/15/2018   Q1: Little interest or pleasure in doing things 2   Q2: Feeling down, depressed or hopeless 2   PHQ-2 Score 4   Q1: Little interest or pleasure in doing things More than half the days   Q2: Feeling down, depressed or hopeless More than half the days   PHQ-2 Score 4       Abuse: Current or Past(Physical, Sexual or Emotional)- No  Do you feel safe in your environment - Yes    Social History   Substance Use Topics     Smoking status: Never Smoker     Smokeless tobacco: Never Used     Alcohol use Yes      Comment: once per weeks      Alcohol Use 6/15/2018   If you drink alcohol do you typically have greater than 3 drinks per day OR greater than 7 drinks per week? No       Reviewed orders with patient.  Reviewed health maintenance and updated orders accordingly - Yes  Labs reviewed in EPIC  BP Readings from Last 3 Encounters:   06/15/18 125/72   02/28/18 130/83   02/25/18 133/81    Wt Readings from Last 3 Encounters:   06/15/18 194 lb 9.6 oz (88.3 kg)   02/28/18 182 lb (82.6 kg)   02/25/18 180 lb (81.6 kg)                  Patient Active Problem List   Diagnosis     Acquired hypothyroidism     CARDIOVASCULAR SCREENING; LDL GOAL LESS THAN 160     Joint pain     Moderate major depression (H)     Vitamin D deficiency disease     Past Surgical History:   Procedure Laterality Date     ENT SURGERY      wisdom teeth removal     EXCISE LESION HEAD N/A 7/17/2017    Procedure:  EXCISE LESION HEAD;  (LOCAL) EXCISION OF RIGHT SCALP MASS AND LEFT POSTERIOR SCALP MASS x2;  Surgeon: Lori Acosta MD;  Location: [facility]     HEAD & NECK SURGERY  7/2018    cysts removed from scalp       Social History   Substance Use Topics     Smoking status: Never Smoker     Smokeless tobacco: Never Used     Alcohol use Yes      Comment: once per weeks      Family History   Problem Relation Age of Onset     CANCER Father      kidney cancer: in remission     Other Cancer Father      Kidney     Depression Father      Other Cancer Maternal Grandmother      Bladder     Thyroid Disease Maternal Grandmother      Hypertension Maternal Grandfather      DIABETES Maternal Grandfather      Other Cancer Paternal Grandfather      Hypertension Brother          Current Outpatient Prescriptions   Medication Sig Dispense Refill     levothyroxine (SYNTHROID/LEVOTHROID) 112 MCG tablet Take 1 tablet (112 mcg) by mouth daily 90 tablet 3     MILK THISTLE PO Take 1 tablet by mouth daily        MULTIVITAMIN CHEW   OR take one per day       Omega-3 Fatty Acids (FISH OIL PO) Take 1 g by mouth daily        VITAMIN D 2000 UNIT OR TABS one tablet daily       [DISCONTINUED] levothyroxine (SYNTHROID/LEVOTHROID) 112 MCG tablet Take 1 tablet (112 mcg) by mouth daily 90 tablet 3     Allergies   Allergen Reactions     Amoxicillin Rash     Recent Labs   Lab Test  06/12/17   1002  01/16/17   1029  06/10/16   1036  06/09/15   1108   06/12/12   1528   LDL  79   --    --   59   --   88   HDL  42*   --    --   44*   --   39*   TRIG  79   --    --   82   --   94   ALT   --   40   --    --    --    --    CR   --   0.75   --    --    --    --    GFRESTIMATED   --   86   --    --    --    --    GFRESTBLACK   --   >90   GFR Calc     --    --    --    --    POTASSIUM   --   3.9   --    --    --    --    TSH  1.98   --   1.44  0.76   < >  2.05    < > = values in this interval not displayed.        Patient under age 50, mutual decision  "reflected in health maintenance.      Pertinent mammograms are reviewed under the imaging tab.  History of abnormal Pap smear:   Last 3 Pap and HPV Results:   PAP / HPV 6/9/2015 6/12/2012 3/5/2010   PAP NIL NIL NIL       Reviewed and updated as needed this visit by clinical staff  Tobacco  Allergies  Meds  Med Hx  Surg Hx  Fam Hx  Soc Hx        Reviewed and updated as needed this visit by Provider  Fam Hx          Joint pain:  Problems for 5 years  Rheumatology consult then came back inconclusive.  She has no plans to go back to rheumatology.    Mood:  Slightly lower than typical.  She is going to therapy.  Her energy level is low she is not sure if this is related to her mood or her thyroid or something else.      Review of Systems  CONSTITUTIONAL: NEGATIVE for fever, chills, change in weight  INTEGUMENTARU/SKIN: NEGATIVE for worrisome rashes, moles or lesions  EYES: NEGATIVE for vision changes or irritation  ENT: NEGATIVE for ear, mouth and throat problems  RESP: NEGATIVE for significant cough or SOB  BREAST: NEGATIVE for masses, tenderness or discharge  CV: NEGATIVE for chest pain, palpitations or peripheral edema  GI: NEGATIVE for nausea, abdominal pain, heartburn, or change in bowel habits  : NEGATIVE for unusual urinary or vaginal symptoms. Periods are regular.  MUSCULOSKELETAL: NEGATIVE for significant arthralgias or myalgia  NEURO: NEGATIVE for weakness, dizziness or paresthesias  ENDOCRINE: NEGATIVE for temperature intolerance, skin/hair changes  PSYCHIATRIC: NEGATIVE for changes in mood or affect     OBJECTIVE:   /72  Pulse 86  Temp 97.8  F (36.6  C) (Oral)  Resp 20  Ht 5' 3.25\" (1.607 m)  Wt 194 lb 9.6 oz (88.3 kg)  LMP 04/23/2018 (Approximate)  SpO2 98%  Breastfeeding? No  BMI 34.2 kg/m2  Physical Exam  GENERAL: healthy, alert and no distress  EYES: Eyes grossly normal to inspection, PERRL and conjunctivae and sclerae normal  HENT: ear canals and TM's normal, nose and mouth " without ulcers or lesions  NECK: no adenopathy, no asymmetry, masses, or scars and thyroid normal to palpation  RESP: lungs clear to auscultation - no rales, rhonchi or wheezes  BREAST: normal without masses, tenderness or nipple discharge and no palpable axillary masses or adenopathy  CV: regular rate and rhythm, normal S1 S2, no S3 or S4, no murmur, click or rub, no peripheral edema and peripheral pulses strong  ABDOMEN: soft, nontender, no hepatosplenomegaly, no masses and bowel sounds normal   (female): normal female external genitalia, normal urethral meatus, vaginal mucosa pink, moist, well rugated, and normal cervix/adnexa/uterus without masses or discharge  MS: no gross musculoskeletal defects noted, no edema  SKIN: no suspicious lesions or rashes  NEURO: Normal strength and tone, mentation intact and speech normal  PSYCH: mentation appears normal, affect normal/bright    ASSESSMENT/PLAN:   (Z01.411) Encounter for gynecological examination with abnormal finding  (primary encounter diagnosis)  Comment:   Plan: TSH WITH FREE T4 REFLEX, Pap imaged thin layer         screen with HPV - recommended age 30 - 65 years        (select HPV order below), HPV High Risk Types         DNA Cervical, Hemoglobin, Lipid panel reflex to        direct LDL Fasting, Comprehensive metabolic         panel, Vitamin D Deficiency, *MA Screening         Digital Bilateral        Regarding her energy level, and we are rechecking her thyroid level and vitamin D levels today.  I also told her that her joint pain  may very well be related to her fatigue.  If her labs are abnormal she will be treated.  If her labs are normal, I really want her to follow-up with rheumatology as I feel at that point rheumatology issue.  She verbalizes understanding.    (E03.9) Acquired hypothyroidism  Comment:   Plan: levothyroxine (SYNTHROID/LEVOTHROID) 112 MCG         tablet        (F32.1) Moderate major depression (H)  Comment:   Plan: DEPRESSION ACTION  "PLAN (DAP)        Continue care with the therapist.  Return to see me for a recheck if her mood worsens or does not improve at that time we will start medications (she does not necessarily want medications today.)    (R53.83) Fatigue, unspecified type  Comment:   Plan: TSH WITH FREE T4 REFLEX, Hemoglobin,         Comprehensive metabolic panel, Vitamin D         Deficiency        As above    (M25.50) Arthralgia, unspecified joint  Comment: Continues  Plan: RHEUMATOLOGY REFERRAL        As above    (Z12.31) Breast cancer screening  Comment: routine  Plan:     (Z12.4) Screening for malignant neoplasm of cervix  Comment: routine  Plan: Pap imaged thin layer screen with HPV -         recommended age 30 - 65 years (select HPV order        below), HPV High Risk Types DNA Cervical        Done today        COUNSELING:  Reviewed preventive health counseling, as reflected in patient instructions         reports that she has never smoked. She has never used smokeless tobacco.    Estimated body mass index is 34.2 kg/(m^2) as calculated from the following:    Height as of this encounter: 5' 3.25\" (1.607 m).    Weight as of this encounter: 194 lb 9.6 oz (88.3 kg).   Weight management plan: Discussed healthy diet and exercise guidelines and patient will follow up in 12 months in clinic to re-evaluate.    Counseling Resources:  ATP IV Guidelines  Pooled Cohorts Equation Calculator  Breast Cancer Risk Calculator  FRAX Risk Assessment  ICSI Preventive Guidelines  Dietary Guidelines for Americans, 2010  USDA's MyPlate  ASA Prophylaxis  Lung CA Screening    [place] [facility]  Answers for HPI/ROS submitted by the patient on 6/15/2018   PHQ-2 Score: 4  If you checked off any problems, how difficult have these problems made it for you to do your work, take care of things at home, or get along with other people?: Somewhat difficult  PHQ9 TOTAL SCORE: 12    "

## 2018-06-15 NOTE — MR AVS SNAPSHOT
After Visit Summary   6/15/2018    Bhavana Castellanos    MRN: [id number]           Patient Information     Date Of Birth          1975        Visit Information        Provider Department      6/15/2018 9:20 AM Ana Rosales APRN [facility]        Today's Diagnoses     Encounter for gynecological examination with abnormal finding    -  1    Acquired hypothyroidism        Moderate major depression (H)        Fatigue, unspecified type        Screening for malignant neoplasm of cervix        Arthralgia, unspecified joint        Breast cancer screening          Care Instructions      Preventive Health Recommendations  Female Ages 40 to 49    Yearly exam:     See your health care provider every year in order to  1. Review health changes.   2. Discuss preventive care.    3. Review your medicines if your doctor prescribed any.      Get a Pap test every three years (unless you have an abnormal result and your provider advises testing more often).      If you get Pap tests with HPV test, you only need to test every 5 years, unless you have an abnormal result. You do not need a Pap test if your uterus was removed (hysterectomy) and you have not had cancer.      You should be tested each year for STDs (sexually transmitted diseases), if you're at risk.       Ask your doctor if you should have a mammogram.      Have a colonoscopy (test for colon cancer) if someone in your family has had colon cancer or polyps before age 50.       Have a cholesterol test every 5 years.       Have a diabetes test (fasting glucose) after age 45. If you are at risk for diabetes, you should have this test every 3 years.    Shots: Get a flu shot each year. Get a tetanus shot every 10 years.     Nutrition:     Eat at least 5 servings of fruits and vegetables each day.    Eat whole-grain bread, whole-wheat pasta and brown rice instead of white grains and rice.    Talk to your provider about Calcium and  Vitamin D.     Lifestyle    Exercise at least 150 minutes a week (an average of 30 minutes a day, 5 days a week). This will help you control your weight and prevent disease.    Limit alcohol to one drink per day.    No smoking.     Wear sunscreen to prevent skin cancer.    See your dentist every six months for an exam and cleaning.          Follow-ups after your visit        Additional Services     RHEUMATOLOGY REFERRAL       Your provider has referred you to: [facility] [phone]   [email or web address]    Please be aware that coverage of these services is subject to the terms and limitations of your health insurance plan.  Call member services at your health plan with any benefit or coverage questions.      Please bring the following with you to your appointment:    (1) Any X-Rays, CTs or MRIs which have been performed.  Contact the facility where they were done to arrange for  prior to your scheduled appointment.    (2) List of current medications   (3) This referral request   (4) Any documents/labs given to you for this referral                  Future tests that were ordered for you today     Open Future Orders        Priority Expected Expires Ordered    *MA Screening Digital Bilateral Routine  6/15/2019 6/15/2018            Who to contact     If you have questions or need follow up information about today's clinic visit or your schedule please contact [facility] directly at [phone].  Normal or non-critical lab and imaging results will be communicated to you by MyChart, letter or phone within 4 business days after the clinic has received the results. If you do not hear from us within 7 days, please contact the clinic through MyChart or phone. If you have a critical or abnormal lab result, we will notify you by phone as soon as possible.  Submit refill requests through MemBlaze or call your pharmacy and they will forward the refill  "request to us. Please allow 3 business days for your refill to be completed.          Additional Information About Your Visit        LogRhythmhart Information     RawFlow gives you secure access to your electronic health record. If you see a primary care provider, you can also send messages to your care team and make appointments. If you have questions, please call your primary care clinic.  If you do not have a primary care provider, please call [phone] and they will assist you.        Care EveryWhere ID     This is your Care EveryWhere ID. This could be used by other organizations to access your [facility] medical records  [id number]        Your Vitals Were     Pulse Temperature Respirations Height Last Period Pulse Oximetry    86 97.8  F (36.6  C) (Oral) 20 5' 3.25\" (1.607 m) 04/23/2018 (Approximate) 98%    Breastfeeding? BMI (Body Mass Index)                No 34.2 kg/m2           Blood Pressure from Last 3 Encounters:   06/15/18 125/72   02/28/18 130/83   02/25/18 133/81    Weight from Last 3 Encounters:   06/15/18 194 lb 9.6 oz (88.3 kg)   02/28/18 182 lb (82.6 kg)   02/25/18 180 lb (81.6 kg)              We Performed the Following     Comprehensive metabolic panel     DEPRESSION ACTION PLAN (DAP)     Hemoglobin     HPV High Risk Types DNA Cervical     Lipid panel reflex to direct LDL Fasting     Pap imaged thin layer screen with HPV - recommended age 30 - 65 years (select HPV order below)     RHEUMATOLOGY REFERRAL     TSH WITH FREE T4 REFLEX     Vitamin D Deficiency          Where to get your medicines      These medications were sent to [place] [address] [place] [facility] [address]  [address] [address]     Phone:  [phone]     levothyroxine 112 MCG tablet          Primary Care Provider Office Phone # Fax #    [place] [facility] [phone] [address] [place] [address]        Equal Access to Services     DHRUV REYES AH: " Hadii darryl Ryan, waluis eduardoda luqadaha, qaybta kamarshall alexisjersey, aníbal catrachito marquiseroel espinozaderek meghnalarrycarlitos crespovikkiroel cindy. [facility] [phone].    ATENCIÓN: Si habla michelle, tiene a zhu disposición servicios gratuitos de asistencia lingüística. Llame al [phone].    We comply with applicable federal civil rights laws and [place] laws. We do not discriminate on the basis of race, color, national origin, age, disability, sex, sexual orientation, or gender identity.            Thank you!     Thank you for choosing [facility]  for your care. Our goal is always to provide you with excellent care. Hearing back from our patients is one way we can continue to improve our services. Please take a few minutes to complete the written survey that you may receive in the mail after your visit with us. Thank you!             Your Updated Medication List - Protect others around you: Learn how to safely use, store and throw away your medicines at www.disposemymeds.org.          This list is accurate as of 6/15/18  9:37 AM.  Always use your most recent med list.                   Brand Name Dispense Instructions for use Diagnosis    FISH OIL PO      Take 1 g by mouth daily        levothyroxine 112 MCG tablet    SYNTHROID/LEVOTHROID    90 tablet    Take 1 tablet (112 mcg) by mouth daily    Acquired hypothyroidism       MILK THISTLE PO      Take 1 tablet by mouth daily        MULTIVITAMIN CHEW   OR      take one per day    Routine general medical examination at a health care facility       vitamin D 2000 units tablet      one tablet daily    Routine general medical examination at a health care facility

## 2018-06-16 ASSESSMENT — PATIENT HEALTH QUESTIONNAIRE - PHQ9: SUM OF ALL RESPONSES TO PHQ QUESTIONS 1-9: 12

## 2018-06-18 NOTE — PROGRESS NOTES
Good morning Bhavana,    This note is to let you know the results of your recent lab tests.    Your blood count, cholesterol level, thyroid, electrolytes, kidney function studies, liver function studies, glucose, and calcium all came back normal.    Your vitamin D level did come back slightly below the normal limit of 20 at 19.  I would definitely recommend that you take an over-the-counter supplement of vitamin D3 2000 units every single day.  If you have been taking your vitamin D3 supplement consistently and this level is low, I would recommend that you increase your vitamin D level.  Please let me know if you have any questions otherwise I will expect that you are taking vitamin D3 2000 units per day.    Ana

## 2018-06-19 LAB
COPATH REPORT: NORMAL
PAP: NORMAL

## 2018-06-20 LAB
FINAL DIAGNOSIS: NORMAL
HPV HR 12 DNA CVX QL NAA+PROBE: NEGATIVE
HPV16 DNA SPEC QL NAA+PROBE: NEGATIVE
HPV18 DNA SPEC QL NAA+PROBE: NEGATIVE
SPECIMEN DESCRIPTION: NORMAL
SPECIMEN SOURCE CVX/VAG CYTO: NORMAL

## 2019-05-24 DIAGNOSIS — E03.9 ACQUIRED HYPOTHYROIDISM: ICD-10-CM

## 2019-05-24 RX ORDER — LEVOTHYROXINE SODIUM 112 UG/1
112 TABLET ORAL DAILY
Qty: 90 TABLET | Refills: 0 | Status: SHIPPED | OUTPATIENT
Start: 2019-05-24 | End: 2019-06-18

## 2019-05-24 NOTE — TELEPHONE ENCOUNTER
Left message on answering machine that medication was approved and sent to pharmacy and that an additional appointment must be scheduled prior to additional refills given. Sending letter    Natalie Marshall

## 2019-05-24 NOTE — TELEPHONE ENCOUNTER
"Requested Prescriptions   Pending Prescriptions Disp Refills     levothyroxine (SYNTHROID/LEVOTHROID) 112 MCG tablet [Pharmacy Med Name: LEVOTHYROXINE 112 MCG TABLET]  Last Written Prescription Date:  6-15-18  Last Fill Quantity: 90 tab,  # refills: 3   Last office visit: 6/15/2018 with prescribing provider:  Ana Rosales   Future Office Visit:    90 tablet 3     Sig: TAKE 1 TABLET (112 MCG) BY MOUTH DAILY       Thyroid Protocol Passed - 5/24/2019  1:45 AM        Passed - Patient is 12 years or older        Passed - Recent (12 mo) or future (30 days) visit within the authorizing provider's specialty     Patient had office visit in the last 12 months or has a visit in the next 30 days with authorizing provider or within the authorizing provider's specialty.  See \"Patient Info\" tab in inbasket, or \"Choose Columns\" in Meds & Orders section of the refill encounter.            Passed - Medication is active on med list        Passed - Normal TSH on file in past 12 months     Recent Labs   Lab Test 06/15/18  0946   TSH 1.28           Passed - No active pregnancy on record     If patient is pregnant or has had a positive pregnancy test, please check TSH.        Passed - No positive pregnancy test in past 12 months     If patient is pregnant or has had a positive pregnancy test, please check TSH         "

## 2019-05-24 NOTE — LETTER
[address] [address]  Phone: [phone]    05/24/19    Bhavana Castellanos  [address]  [address]  [place] [address]      To whom it may concern:     In order to ensure we are providing the best quality care, we have reviewed your chart and see that you are due for an annual physical.    We have also sent your levothyroxine (SYNTHROID/LEVOTHROID) 112 MCG tablet medication to your pharmacy. For future medication refills, please contact your primary care clinic to schedule an appointment. This can be requested via [email or web address] or by calling the clinic at [phone].    We greatly appreciate the opportunity to serve you. Thank you for trusting us with your health care.      Sincerely,      Your care team at [facility]

## 2019-06-18 ENCOUNTER — OFFICE VISIT (OUTPATIENT)
Dept: FAMILY MEDICINE | Facility: CLINIC | Age: 44
End: 2019-06-18
Payer: COMMERCIAL

## 2019-06-18 VITALS
HEART RATE: 87 BPM | HEIGHT: 64 IN | BODY MASS INDEX: 33.29 KG/M2 | DIASTOLIC BLOOD PRESSURE: 72 MMHG | TEMPERATURE: 97 F | SYSTOLIC BLOOD PRESSURE: 113 MMHG | OXYGEN SATURATION: 95 % | RESPIRATION RATE: 16 BRPM | WEIGHT: 195 LBS

## 2019-06-18 DIAGNOSIS — Z13.1 SCREENING FOR DIABETES MELLITUS: ICD-10-CM

## 2019-06-18 DIAGNOSIS — E03.9 ACQUIRED HYPOTHYROIDISM: ICD-10-CM

## 2019-06-18 DIAGNOSIS — F32.1 MODERATE MAJOR DEPRESSION (H): ICD-10-CM

## 2019-06-18 DIAGNOSIS — E55.9 VITAMIN D DEFICIENCY DISEASE: ICD-10-CM

## 2019-06-18 DIAGNOSIS — Z00.00 ROUTINE GENERAL MEDICAL EXAMINATION AT A HEALTH CARE FACILITY: Primary | ICD-10-CM

## 2019-06-18 DIAGNOSIS — Z13.6 CARDIOVASCULAR SCREENING; LDL GOAL LESS THAN 100: ICD-10-CM

## 2019-06-18 DIAGNOSIS — Z13.0 SCREENING FOR IRON DEFICIENCY ANEMIA: ICD-10-CM

## 2019-06-18 LAB
CHOLEST SERPL-MCNC: 156 MG/DL
DEPRECATED CALCIDIOL+CALCIFEROL SERPL-MC: 27 UG/L (ref 20–75)
GLUCOSE SERPL-MCNC: 90 MG/DL (ref 70–99)
HDLC SERPL-MCNC: 41 MG/DL
HGB BLD-MCNC: 14.5 G/DL (ref 11.7–15.7)
LDLC SERPL CALC-MCNC: 89 MG/DL
NONHDLC SERPL-MCNC: 115 MG/DL
TRIGL SERPL-MCNC: 132 MG/DL
TSH SERPL DL<=0.005 MIU/L-ACNC: 3.14 MU/L (ref 0.4–4)

## 2019-06-18 PROCEDURE — 99213 OFFICE O/P EST LOW 20 MIN: CPT | Mod: 25 | Performed by: NURSE PRACTITIONER

## 2019-06-18 PROCEDURE — 99396 PREV VISIT EST AGE 40-64: CPT | Performed by: NURSE PRACTITIONER

## 2019-06-18 PROCEDURE — 82947 ASSAY GLUCOSE BLOOD QUANT: CPT | Performed by: NURSE PRACTITIONER

## 2019-06-18 PROCEDURE — 84443 ASSAY THYROID STIM HORMONE: CPT | Performed by: NURSE PRACTITIONER

## 2019-06-18 PROCEDURE — 85018 HEMOGLOBIN: CPT | Performed by: NURSE PRACTITIONER

## 2019-06-18 PROCEDURE — 80061 LIPID PANEL: CPT | Performed by: NURSE PRACTITIONER

## 2019-06-18 PROCEDURE — 82306 VITAMIN D 25 HYDROXY: CPT | Performed by: NURSE PRACTITIONER

## 2019-06-18 PROCEDURE — 36415 COLL VENOUS BLD VENIPUNCTURE: CPT | Performed by: NURSE PRACTITIONER

## 2019-06-18 RX ORDER — LEVOTHYROXINE SODIUM 112 UG/1
112 TABLET ORAL DAILY
Qty: 90 TABLET | Refills: 3 | Status: SHIPPED | OUTPATIENT
Start: 2019-06-18 | End: 2020-06-11

## 2019-06-18 ASSESSMENT — ENCOUNTER SYMPTOMS
PARESTHESIAS: 0
ABDOMINAL PAIN: 0
ARTHRALGIAS: 0
DYSURIA: 0
FEVER: 0
EYE PAIN: 0
DIARRHEA: 0
SORE THROAT: 0
PALPITATIONS: 0
HEADACHES: 0
HEARTBURN: 0
HEMATOCHEZIA: 0
JOINT SWELLING: 0
NERVOUS/ANXIOUS: 1
WEAKNESS: 0
COUGH: 0
CONSTIPATION: 0
MYALGIAS: 0
DIZZINESS: 0
CHILLS: 0
HEMATURIA: 0
NAUSEA: 0
BREAST MASS: 0
FREQUENCY: 0
SHORTNESS OF BREATH: 0

## 2019-06-18 ASSESSMENT — MIFFLIN-ST. JEOR: SCORE: 1529.51

## 2019-06-18 ASSESSMENT — PATIENT HEALTH QUESTIONNAIRE - PHQ9
10. IF YOU CHECKED OFF ANY PROBLEMS, HOW DIFFICULT HAVE THESE PROBLEMS MADE IT FOR YOU TO DO YOUR WORK, TAKE CARE OF THINGS AT HOME, OR GET ALONG WITH OTHER PEOPLE: SOMEWHAT DIFFICULT
SUM OF ALL RESPONSES TO PHQ QUESTIONS 1-9: 11
SUM OF ALL RESPONSES TO PHQ QUESTIONS 1-9: 11

## 2019-06-18 NOTE — PATIENT INSTRUCTIONS
Preventive Health Recommendations  Female Ages 40 to 49    Yearly exam:     See your health care provider every year in order to  1. Review health changes.   2. Discuss preventive care.    3. Review your medicines if your doctor prescribed any.      Get a Pap test every three years (unless you have an abnormal result and your provider advises testing more often).      If you get Pap tests with HPV test, you only need to test every 5 years, unless you have an abnormal result. You do not need a Pap test if your uterus was removed (hysterectomy) and you have not had cancer.      You should be tested each year for STDs (sexually transmitted diseases), if you're at risk.     Ask your doctor if you should have a mammogram.      Have a colonoscopy (test for colon cancer) if someone in your family has had colon cancer or polyps before age 50.       Have a cholesterol test every 5 years.       Have a diabetes test (fasting glucose) after age 45. If you are at risk for diabetes, you should have this test every 3 years.    Shots: Get a flu shot each year. Get a tetanus shot every 10 years.     Nutrition:     Eat at least 5 servings of fruits and vegetables each day.    Eat whole-grain bread, whole-wheat pasta and brown rice instead of white grains and rice.    Get adequate Calcium and Vitamin D.      Lifestyle    Exercise at least 150 minutes a week (an average of 30 minutes a day, 5 days a week). This will help you control your weight and prevent disease.    Limit alcohol to one drink per day.    No smoking.     Wear sunscreen to prevent skin cancer.    See your dentist every six months for an exam and cleaning.    Patient Education     Understanding Anxiety Disorders    Almost everyone gets nervous now and then. It s normal to have knots in your stomach before a test, or for your heart to race on a first date. But an anxiety disorder is much more than a case of nerves. In fact, its symptoms may be overwhelming. But  treatment can relieve many of these symptoms. Talking to your healthcare provider is the first step.  What are anxiety disorders?  An anxiety disorder causes intense feelings of panic and fear. These feelings may arise for no apparent reason. And they tend to recur again and again. They may prevent you from coping with life and cause you great distress. As a result, you may avoid anything that triggers your fear. In extreme cases, you may never leave the house. Anxiety disorders may cause other symptoms, such as:    Obsessive thoughts you can t control    Constant nightmares or painful thoughts of the past    Nausea, sweating, and muscle tension    Trouble sleeping or concentrating  What causes anxiety disorders?  Anxiety disorders tend to run in families. For some people, childhood abuse or neglect may play a role. For others, stressful life events or trauma may trigger anxiety disorders. Anxiety can trigger low self-esteem and poor coping skills.  Common anxiety disorders    Panic disorder. This causes an intense fear of being in danger.    Phobias. These are extreme fears of certain objects, places, or events.    Obsessive-compulsive disorder. This causes you to have unwanted thoughts and urges. You also may perform certain actions over and over.    Posttraumatic stress disorder. This occurs in people who have survived a terrible ordeal. It can cause nightmares and flashbacks about the event.    Generalized anxiety disorder. This causes constant worry that can greatly disrupt your life.   Getting better  You may believe that nothing can help you. Or, you might fear what others may think. But most anxiety symptoms can be eased. Having an anxiety disorder is nothing to be ashamed of. Most people do best with treatment that combines medicine and therapy. These aren t cures. But they can help you live a healthier life.  Date Last Reviewed: 2/1/2017 2000-2018 [employer]. [address] [facility],  [place] [address]. All rights reserved. This information is not intended as a substitute for professional medical care. Always follow your healthcare professional's instructions.

## 2019-06-18 NOTE — PROGRESS NOTES
SUBJECTIVE:   CC: Bhavana Castellanos is an 43 year old woman who presents for preventive health visit.     Healthy Habits:     Getting at least 3 servings of Calcium per day:  Yes    Bi-annual eye exam:  Yes    Dental care twice a year:  Yes    Sleep apnea or symptoms of sleep apnea:  None    Diet:  Regular (no restrictions)    Frequency of exercise:  2-3 days/week    Duration of exercise:  30-45 minutes    Taking medications regularly:  Yes    Medication side effects:  None    PHQ-2 Total Score: 3    Additional concerns today:  No      Today's PHQ-2 Score:   PHQ-2 ( 1999 [employer]) 6/18/2019   Q1: Little interest or pleasure in doing things 1   Q2: Feeling down, depressed or hopeless 2   PHQ-2 Score 3   Q1: Little interest or pleasure in doing things Several days   Q2: Feeling down, depressed or hopeless More than half the days   PHQ-2 Score 3       Abuse: Current or Past(Physical, Sexual or Emotional)- No  Do you feel safe in your environment? Yes    Social History     Tobacco Use     Smoking status: Never Smoker     Smokeless tobacco: Never Used   Substance Use Topics     Alcohol use: Yes     Comment: once per weeks      Alcohol Use 6/18/2019   Prescreen: >3 drinks/day or >7 drinks/week? No   Prescreen: >3 drinks/day or >7 drinks/week? -   No flowsheet data found.    Reviewed orders with patient.  Reviewed health maintenance and updated orders accordingly - Yes  Lab work is in process  Labs reviewed in EPIC  BP Readings from Last 3 Encounters:   06/18/19 113/72   06/15/18 125/72   02/28/18 130/83    Wt Readings from Last 3 Encounters:   06/18/19 88.5 kg (195 lb)   06/15/18 88.3 kg (194 lb 9.6 oz)   02/28/18 82.6 kg (182 lb)              Patient Active Problem List   Diagnosis     Acquired hypothyroidism     CARDIOVASCULAR SCREENING; LDL GOAL LESS THAN 160     Joint pain     Moderate major depression (H)     Vitamin D deficiency disease     Past Surgical History:   Procedure Laterality Date     ENT SURGERY       wisdom teeth removal     EXCISE LESION HEAD N/A 7/17/2017    Procedure: EXCISE LESION HEAD;  (LOCAL) EXCISION OF RIGHT SCALP MASS AND LEFT POSTERIOR SCALP MASS x2;  Surgeon: oLri Acosta MD;  Location:  [facility]     HEAD & NECK SURGERY  7/2018    cysts removed from scalp       Social History     Tobacco Use     Smoking status: Never Smoker     Smokeless tobacco: Never Used   Substance Use Topics     Alcohol use: Yes     Comment: once per weeks      Family History   Problem Relation Age of Onset     Cancer Father         kidney cancer: in remission     Other Cancer Father         Kidney     Depression Father      Other Cancer Maternal Grandmother         Bladder     Thyroid Disease Maternal Grandmother      Hypertension Maternal Grandfather      Diabetes Maternal Grandfather      Other Cancer Paternal Grandfather      Hypertension Brother          Current Outpatient Medications   Medication Sig Dispense Refill     levothyroxine (SYNTHROID/LEVOTHROID) 112 MCG tablet TAKE 1 TABLET (112 MCG) BY MOUTH DAILY 90 tablet 0     MILK THISTLE PO Take 1 tablet by mouth daily        MULTIVITAMIN CHEW   OR take one per day       Omega-3 Fatty Acids (FISH OIL PO) Take 1 g by mouth daily        VITAMIN D 2000 UNIT OR TABS one tablet daily       Allergies   Allergen Reactions     Amoxicillin Rash     Recent Labs   Lab Test 06/15/18  0946 06/12/17  1002 01/16/17  1029  06/09/15  1108   LDL 67 79  --   --  59   HDL 51 42*  --   --  44*   TRIG 87 79  --   --  82   ALT 36  --  40  --   --    CR 0.68  --  0.75  --   --    GFRESTIMATED >90  --  86  --   --    GFRESTBLACK >90  --  >90  African American GFR Calc    --   --    POTASSIUM 4.2  --  3.9  --   --    TSH 1.28 1.98  --    < > 0.76    < > = values in this interval not displayed.        Mammogram Screening: Patient under age 50, mutual decision reflected in health maintenance.      Pertinent mammograms are reviewed under the imaging tab.  History of abnormal Pap smear:   Last 3 Pap  and HPV Results:   PAP / HPV Latest Ref Rng & Units 6/15/2018 6/9/2015 6/12/2012   PAP - NIL NIL NIL   HPV 16 DNA NEG:Negative Negative - -   HPV 18 DNA NEG:Negative Negative - -   OTHER HR HPV NEG:Negative Negative - -     PAP / HPV Latest Ref Rng & Units 6/15/2018 6/9/2015 6/12/2012   PAP - NIL NIL NIL   HPV 16 DNA NEG:Negative Negative - -   HPV 18 DNA NEG:Negative Negative - -   OTHER HR HPV NEG:Negative Negative - -   periods:  Irregularly regular periods.   Sometimes heavy, sometimes light.    Thyroid:  Going well.  Due a recheck today.    Vitamin D level  History of being low.  She had gone off of her vitamin D supplement for several months and just restarted her supplement last week.      Reviewed and updated as needed this visit by clinical staff  Tobacco  Allergies  Meds  Med Hx  Surg Hx  Fam Hx  Soc Hx        Reviewed and updated as needed this visit by Provider            Review of Systems   Constitutional: Negative for chills and fever.   HENT: Negative for congestion, ear pain, hearing loss and sore throat.    Eyes: Negative for pain and visual disturbance.   Respiratory: Negative for cough and shortness of breath.    Cardiovascular: Negative for chest pain, palpitations and peripheral edema.   Gastrointestinal: Negative for abdominal pain, constipation, diarrhea, heartburn, hematochezia and nausea.   Breasts:  Negative for tenderness, breast mass and discharge.   Genitourinary: Negative for dysuria, frequency, genital sores, hematuria, pelvic pain, urgency, vaginal bleeding and vaginal discharge.   Musculoskeletal: Negative for arthralgias, joint swelling and myalgias.   Skin: Negative for rash.   Neurological: Negative for dizziness, weakness, headaches and paresthesias.   Psychiatric/Behavioral: Negative for mood changes. The patient is nervous/anxious.         OBJECTIVE:   /72 (BP Location: Right arm, Patient Position: Sitting, Cuff Size: Adult Large)   Pulse 87   Temp 97  F (36.1  C)  "(Oral)   Resp 16   Ht 1.626 m (5' 4\")   Wt 88.5 kg (195 lb)   LMP 06/03/2019 (Approximate)   SpO2 95%   BMI 33.47 kg/m    Physical Exam  GENERAL: healthy, alert and no distress  EYES: Eyes grossly normal to inspection, PERRL and conjunctivae and sclerae normal  HENT: ear canals and TM's normal, nose and mouth without ulcers or lesions  NECK: no adenopathy, no asymmetry, masses, or scars and thyroid normal to palpation  RESP: lungs clear to auscultation - no rales, rhonchi or wheezes  CV: regular rate and rhythm, normal S1 S2, no S3 or S4, no murmur, click or rub, no peripheral edema and peripheral pulses strong  ABDOMEN: soft, nontender, no hepatosplenomegaly, no masses and bowel sounds normal  MS: no gross musculoskeletal defects noted, no edema  SKIN: no suspicious lesions or rashes  NEURO: Normal strength and tone, mentation intact and speech normal  PSYCH: mentation appears normal, affect normal/bright    Diagnostic Test Results:  Results pending    ASSESSMENT/PLAN:   (Z00.00) Routine general medical examination at a health care facility  (primary encounter diagnosis)  Comment:  Plan: TSH WITH FREE T4 REFLEX, Vitamin D Deficiency,         Lipid panel reflex to direct LDL Fasting,         Hemoglobin, Glucose            (E03.9) Acquired hypothyroidism  Comment: History of  Plan: TSH WITH FREE T4 REFLEX, levothyroxine         (SYNTHROID/LEVOTHROID) 112 MCG tablet        Today's TSH level is pending.  I did refill her current dose of levothyroxine and I told her if her lab test comes back abnormal, her dose will need to be adjusted.  She will be notified by my chart she agrees/understands.    (F32.1) Moderate major depression (H)  Comment: Stable  Plan: Per the patient, her PHQ 9 today is purely situational related to some job changes.  She states she is doing okay, does not take medications, and she will take care of herself.  I encouraged her to follow-up with me if her mood does not improve or with any " "worsening.  She agrees and understands.    (E55.9) Vitamin D deficiency disease  Comment: History of  Plan: Vitamin D Deficiency        With her history of a low vitamin D, I did encourage her to continue her vitamin D supplement at 2000 IUs every day.  She agrees.    (Z13.1) Screening for diabetes mellitus  Comment: Routine  Plan: Glucose        Done today    (Z13.6) CARDIOVASCULAR SCREENING; LDL GOAL LESS THAN 100  Comment: Routine  Plan: Lipid panel reflex to direct LDL Fasting        Done today    (Z13.0) Screening for iron deficiency anemia  Comment: Routine  Plan: Hemoglobin        Done today      COUNSELING:  Reviewed preventive health counseling, as reflected in patient instructions    Estimated body mass index is 33.47 kg/m  as calculated from the following:    Height as of this encounter: 1.626 m (5' 4\").    Weight as of this encounter: 88.5 kg (195 lb).    Weight management plan: Discussed healthy diet and exercise guidelines     reports that she has never smoked. She has never used smokeless tobacco.      Counseling Resources:  ATP IV Guidelines  Pooled Cohorts Equation Calculator  Breast Cancer Risk Calculator  FRAX Risk Assessment  ICSI Preventive Guidelines  Dietary Guidelines for Americans, 2010  [employer] MyPlate  ASA Prophylaxis  Lung CA Screening    [facility]  Answers for HPI/ROS submitted by the patient on 6/18/2019   Annual Exam:  If you checked off any problems, how difficult have these problems made it for you to do your work, take care of things at home, or get along with other people?: Somewhat difficult  PHQ9 TOTAL SCORE: 11    "

## 2019-06-19 ASSESSMENT — PATIENT HEALTH QUESTIONNAIRE - PHQ9: SUM OF ALL RESPONSES TO PHQ QUESTIONS 1-9: 11

## 2019-06-19 NOTE — RESULT ENCOUNTER NOTE
Lori Zhang,    This note is to let you know that all of your blood test results look great.  Your vitamin D level did come back in the low side of normal and I would recommend that you continue to take the supplement of vitamin D3 2000 units a day.  Your thyroid level is normal so keep taking your same dose of levothyroxine and I will recheck your thyroid level in 1 year.    Let me know if you have any questions.  I hope you have a great summer.    Ana GUTHRIE CNP

## 2019-08-22 DIAGNOSIS — E03.9 ACQUIRED HYPOTHYROIDISM: ICD-10-CM

## 2019-08-22 NOTE — TELEPHONE ENCOUNTER
"Requested Prescriptions   Pending Prescriptions Disp Refills     levothyroxine (SYNTHROID/LEVOTHROID) 112 MCG tablet [Pharmacy Med Name: LEVOTHYROXINE 112 MCG TABLET]  Last Written Prescription Date:  6-18-19  Last Fill Quantity: 90 tab,  # refills: 3   Last office visit: 6/18/2019 with prescribing provider:  Ana Rosales    Future Office Visit:    90 tablet 0     Sig: TAKE 1 TABLET (112 MCG) BY MOUTH DAILY. MUST HAVE YEARLY VISIT FOR REFILLS       Thyroid Protocol Passed - 8/22/2019  2:07 AM        Passed - Patient is 12 years or older        Passed - Recent (12 mo) or future (30 days) visit within the authorizing provider's specialty     Patient had office visit in the last 12 months or has a visit in the next 30 days with authorizing provider or within the authorizing provider's specialty.  See \"Patient Info\" tab in inbasket, or \"Choose Columns\" in Meds & Orders section of the refill encounter.          Passed - Medication is active on med list        Passed - Normal TSH on file in past 12 months     Recent Labs   Lab Test 06/18/19  0916   TSH 3.14           Passed - No active pregnancy on record     If patient is pregnant or has had a positive pregnancy test, please check TSH.        Passed - No positive pregnancy test in past 12 months     If patient is pregnant or has had a positive pregnancy test, please check TSH.         "

## 2019-08-23 RX ORDER — LEVOTHYROXINE SODIUM 112 UG/1
112 TABLET ORAL DAILY
Qty: 90 TABLET | Refills: 2 | Status: SHIPPED | OUTPATIENT
Start: 2019-08-23 | End: 2020-06-11

## 2019-10-02 ENCOUNTER — HEALTH MAINTENANCE LETTER (OUTPATIENT)
Age: 44
End: 2019-10-02

## 2020-01-22 ENCOUNTER — TELEPHONE (OUTPATIENT)
Dept: FAMILY MEDICINE | Facility: CLINIC | Age: 45
End: 2020-01-22

## 2020-01-22 NOTE — TELEPHONE ENCOUNTER
Panel Management Review      Patient has the following on her problem list:     Depression / Dysthymia review    Measure:  Needs PHQ-9 score of 4 or less during index window.  Administer PHQ-9 and if score is 5 or more, send encounter to provider for next steps.    5 - 7 month window range: Now    PHQ-9 SCORE 11/13/2018 6/18/2019 12/13/2019   PHQ-9 Total Score - - -   PHQ-9 Total Score MyChart 5 (Mild depression) 11 (Moderate depression) 5 (Mild depression)   PHQ-9 Total Score 5 11 5       If PHQ-9 recheck is 5 or more, route to provider for next steps.    Patient is due for:  PHQ9      Composite cancer screening  Chart review shows that this patient is due/due soon for the following None  Summary:    Patient is due/failing the following:   PHQ9    Action needed:   Patient needs to do PHQ9.    Type of outreach:    Phone, left message for patient to call back.     Questions for provider review:    None                                                                                                                                    Mehnaz Porter MA       Chart routed to [facility] .           Body Location Override (Optional - Billing Will Still Be Based On Selected Body Map Location If Applicable): right medial trap Add 55899 Cpt? (Important Note: In 2017 The Use Of 14439 Is Being Tracked By Cms To Determine Future Global Period Reimbursement For Global Periods): yes Detail Level: Detailed

## 2020-01-28 ENCOUNTER — MYC MEDICAL ADVICE (OUTPATIENT)
Dept: FAMILY MEDICINE | Facility: CLINIC | Age: 45
End: 2020-01-28

## 2020-01-28 ASSESSMENT — ANXIETY QUESTIONNAIRES
7. FEELING AFRAID AS IF SOMETHING AWFUL MIGHT HAPPEN: SEVERAL DAYS
1. FEELING NERVOUS, ANXIOUS, OR ON EDGE: NEARLY EVERY DAY
2. NOT BEING ABLE TO STOP OR CONTROL WORRYING: SEVERAL DAYS
4. TROUBLE RELAXING: NOT AT ALL
GAD7 TOTAL SCORE: 7
6. BECOMING EASILY ANNOYED OR IRRITABLE: SEVERAL DAYS
GAD7 TOTAL SCORE: 7
7. FEELING AFRAID AS IF SOMETHING AWFUL MIGHT HAPPEN: SEVERAL DAYS
5. BEING SO RESTLESS THAT IT IS HARD TO SIT STILL: NOT AT ALL
3. WORRYING TOO MUCH ABOUT DIFFERENT THINGS: SEVERAL DAYS

## 2020-01-28 ASSESSMENT — PATIENT HEALTH QUESTIONNAIRE - PHQ9
10. IF YOU CHECKED OFF ANY PROBLEMS, HOW DIFFICULT HAVE THESE PROBLEMS MADE IT FOR YOU TO DO YOUR WORK, TAKE CARE OF THINGS AT HOME, OR GET ALONG WITH OTHER PEOPLE: SOMEWHAT DIFFICULT
SUM OF ALL RESPONSES TO PHQ QUESTIONS 1-9: 5
SUM OF ALL RESPONSES TO PHQ QUESTIONS 1-9: 5

## 2020-01-29 ASSESSMENT — ANXIETY QUESTIONNAIRES: GAD7 TOTAL SCORE: 7

## 2020-02-03 ASSESSMENT — PATIENT HEALTH QUESTIONNAIRE - PHQ9: SUM OF ALL RESPONSES TO PHQ QUESTIONS 1-9: 5

## 2020-05-14 DIAGNOSIS — E03.9 ACQUIRED HYPOTHYROIDISM: ICD-10-CM

## 2020-05-17 NOTE — TELEPHONE ENCOUNTER
"Requested Prescriptions   Pending Prescriptions Disp Refills     levothyroxine (SYNTHROID/LEVOTHROID) 112 MCG tablet [Pharmacy Med Name: L-THYROXINE (SYNTHROID) TABS 112MCG] 90 tablet 3     Sig: TAKE 1 TABLET DAILY         Last Written Prescription Date:  6/18/19  Last Fill Quantity: 90,   # refills: 3  Last Office Visit: 6/18/19  Future Office visit:       Routing refill request to provider for review/approval because:  Patient is requesting refill more than 30 days in advance - will be due for annual office visit and labs 6/18 - there is some stacking happening as well      Thyroid Protocol Passed - 5/14/2020  7:46 PM        Passed - Patient is 12 years or older        Passed - Recent (12 mo) or future (30 days) visit within the authorizing provider's specialty     Patient has had an office visit with the authorizing provider or a provider within the authorizing providers department within the previous 12 mos or has a future within next 30 days. See \"Patient Info\" tab in inbasket, or \"Choose Columns\" in Meds & Orders section of the refill encounter.              Passed - Medication is active on med list        Passed - Normal TSH on file in past 12 months     Recent Labs   Lab Test 06/18/19  0916   TSH 3.14              Passed - No active pregnancy on record     If patient is pregnant or has had a positive pregnancy test, please check TSH.          Passed - No positive pregnancy test in past 12 months     If patient is pregnant or has had a positive pregnancy test, please check TSH.               "

## 2020-05-20 ENCOUNTER — MYC REFILL (OUTPATIENT)
Dept: FAMILY MEDICINE | Facility: CLINIC | Age: 45
End: 2020-05-20

## 2020-05-20 DIAGNOSIS — E03.9 ACQUIRED HYPOTHYROIDISM: Primary | ICD-10-CM

## 2020-05-20 NOTE — TELEPHONE ENCOUNTER
Please ask Bhavana to do a virtual visit for this refill request and check in.    Thank you.     Telephone Encounter by Tiera Horowitz at 09/18/18 12:49 PM     Author:  Tiera Horowitz Service:  (none) Author Type:  Patient      Filed:  09/18/18 12:52 PM Encounter Date:  9/18/2018 Status:  Signed     :  Tiera Horowitz (Patient )              JAX MONTGOMERY    Patient Age: 31 year old   Refill request by:[BD1.1T] Phone.  Caller informed to check with the pharmacy later for their refill.  If problems arise, we will contact patient.[BD1.1M]  Refill to be:[BD1.1T] Phoned to[BD1.1M]   Pharmacy[BD1.1T] call marcie ready to  in [place] / ok to leave message[BD1.1M]     Medication requested to be refilled:   Requested Prescriptions     Pending Prescriptions Disp Refills   • amphetamine-dextroamphetamine (ADDERALL) 20 MG tablet 60 Tab 0     Sig: Take 1 Tab by mouth 2 (two) times daily.     Fwd to [BD1.1T] Ana[BD1.1M]  for review. Cannot refill[BD1.1T] Asdderall 20 mg[BD1.1M]  per protocol. Please advise.  Last refill was[BD1.1T] 8-[BD1.1M]  Last office visit was[BD1.1T] 3-  patient is out of medicatiion. Fill asap[BD1.1M]       Next and Last Visit with Provider and Department  Next visit with SENG MARRERO is on No match found  Next visit with [facility] is on No match found   Last visit with SENG MARRERO was on 03/17/2018 at 11:20 AM in [facility]  Last visit with [facility] was on 03/17/2018 at 11:20 AM in [facility]      WEIGHT AND HEIGHT: As of 03/17/2018 weight is 172.8 lbs.(78.382 kg). Height is 5' 5\"(1.651 m).   BMI is 28.76 kg/(m^2) calculated from:     Height 5' 5\" (1.651 m) as of 3/17/18     Weight 172 lb 12.8 oz (78.382 kg) as of 3/17/18      No Known Allergies  Current outpatient prescriptions       Medication  Sig Dispense Refill   • amphetamine-dextroamphetamine (ADDERALL) 20 MG tablet Take 1 Tab by mouth 2 (two) times daily. 60 Tab 0   • buPROPion (WELLBUTRIN SR) 150 MG 12 hr tablet Take 1 Tab by  mouth 2 (two) times daily. 60 Tab 2   • cephALEXin (KEFLEX) 500 MG Cap Take 1 Cap by mouth 2 (two) times daily. 28 Cap 0        ROUTING:[BD1.1T] Patient's physician/staff[BD1.1M]        PCP: Curtis Perez MD         INS: Payor: [employer] / Plan: *No Plan* / Product Type: *No Product type* / Note: This is the primary coverage, but no account was found for this location or the patient's primary location.   ADDRESS:  [address] [place] [id number][BD1.1T]         Revision History        User Key Date/Time User Provider Type Action    > BD1.1 09/18/18 12:52 PM Tiera Horowitz Patient  Sign    M - Manual, T - Template

## 2020-05-22 RX ORDER — LEVOTHYROXINE SODIUM 112 UG/1
112 TABLET ORAL DAILY
Qty: 90 TABLET | Refills: 3 | Status: CANCELLED | OUTPATIENT
Start: 2020-05-22

## 2020-05-22 NOTE — TELEPHONE ENCOUNTER
Pt should have refills until 6/19  Please clarify  And ask her to make a virtual appointment with Ana Rosales CNP prior to then  Also, Ana Rosales CNP, should we have pt do her tsh prior to her phone appointment?  Thanks!     Shira Lakhani RN

## 2020-05-26 ENCOUNTER — MYC MEDICAL ADVICE (OUTPATIENT)
Dept: FAMILY MEDICINE | Facility: CLINIC | Age: 45
End: 2020-05-26

## 2020-05-26 DIAGNOSIS — Z13.0 SCREENING FOR IRON DEFICIENCY ANEMIA: ICD-10-CM

## 2020-05-26 DIAGNOSIS — Z13.21 ENCOUNTER FOR VITAMIN DEFICIENCY SCREENING: ICD-10-CM

## 2020-05-26 DIAGNOSIS — Z13.1 SCREENING FOR DIABETES MELLITUS: Primary | ICD-10-CM

## 2020-05-26 DIAGNOSIS — Z13.220 SCREENING FOR HYPERLIPIDEMIA: ICD-10-CM

## 2020-05-26 NOTE — TELEPHONE ENCOUNTER
Yes, please ask Bhavana to do a virtual visit as well as a TSH before her virtual visit.  Even though this is a little early, she could go ahead and start now.

## 2020-05-26 NOTE — TELEPHONE ENCOUNTER
Lab appt scheduled on 5/29- please sign pending lab order.    Virtual visit scheduled on 6/11. Patient has enough to get her through to that appt.    Natalie ALDANA     [facility]

## 2020-05-26 NOTE — TELEPHONE ENCOUNTER
Virtual visit scheduled with PCP on 6/11.  Patient has enough to get her through to that appt.    Please remove pending medication & close encounter. Thanks!    Natalie ALDANA     [facility]

## 2020-05-28 RX ORDER — LEVOTHYROXINE SODIUM 112 UG/1
TABLET ORAL
Qty: 90 TABLET | Refills: 3 | OUTPATIENT
Start: 2020-05-28

## 2020-05-29 DIAGNOSIS — E03.9 ACQUIRED HYPOTHYROIDISM: ICD-10-CM

## 2020-05-29 DIAGNOSIS — Z13.1 SCREENING FOR DIABETES MELLITUS: ICD-10-CM

## 2020-05-29 DIAGNOSIS — Z13.220 SCREENING FOR HYPERLIPIDEMIA: ICD-10-CM

## 2020-05-29 DIAGNOSIS — Z13.21 ENCOUNTER FOR VITAMIN DEFICIENCY SCREENING: ICD-10-CM

## 2020-05-29 DIAGNOSIS — Z13.0 SCREENING FOR IRON DEFICIENCY ANEMIA: ICD-10-CM

## 2020-05-29 LAB
CHOLEST SERPL-MCNC: 159 MG/DL
GLUCOSE SERPL-MCNC: 85 MG/DL (ref 70–99)
HDLC SERPL-MCNC: 43 MG/DL
HGB BLD-MCNC: 14.1 G/DL (ref 11.7–15.7)
LDLC SERPL CALC-MCNC: 93 MG/DL
NONHDLC SERPL-MCNC: 116 MG/DL
TRIGL SERPL-MCNC: 115 MG/DL
TSH SERPL DL<=0.005 MIU/L-ACNC: 1.37 MU/L (ref 0.4–4)

## 2020-05-29 PROCEDURE — 82947 ASSAY GLUCOSE BLOOD QUANT: CPT | Performed by: NURSE PRACTITIONER

## 2020-05-29 PROCEDURE — 82306 VITAMIN D 25 HYDROXY: CPT | Performed by: NURSE PRACTITIONER

## 2020-05-29 PROCEDURE — 84443 ASSAY THYROID STIM HORMONE: CPT | Performed by: NURSE PRACTITIONER

## 2020-05-29 PROCEDURE — 85018 HEMOGLOBIN: CPT | Performed by: NURSE PRACTITIONER

## 2020-05-29 PROCEDURE — 80061 LIPID PANEL: CPT | Performed by: NURSE PRACTITIONER

## 2020-05-29 PROCEDURE — 36415 COLL VENOUS BLD VENIPUNCTURE: CPT | Performed by: NURSE PRACTITIONER

## 2020-05-31 LAB — DEPRECATED CALCIDIOL+CALCIFEROL SERPL-MC: 26 UG/L (ref 20–75)

## 2020-06-09 NOTE — RESULT ENCOUNTER NOTE
Lori Bateman,    This note is to let you know that all of your blood test results look great.  Let me know if you have any questions.    Ana GUTHRIE CNP

## 2020-06-11 ENCOUNTER — VIRTUAL VISIT (OUTPATIENT)
Dept: FAMILY MEDICINE | Facility: CLINIC | Age: 45
End: 2020-06-11
Payer: COMMERCIAL

## 2020-06-11 DIAGNOSIS — F41.8 SITUATIONAL ANXIETY: ICD-10-CM

## 2020-06-11 DIAGNOSIS — E03.9 ACQUIRED HYPOTHYROIDISM: Primary | ICD-10-CM

## 2020-06-11 PROCEDURE — 99214 OFFICE O/P EST MOD 30 MIN: CPT | Mod: GT | Performed by: NURSE PRACTITIONER

## 2020-06-11 PROCEDURE — 96127 BRIEF EMOTIONAL/BEHAV ASSMT: CPT | Performed by: NURSE PRACTITIONER

## 2020-06-11 RX ORDER — LEVOTHYROXINE SODIUM 112 UG/1
112 TABLET ORAL DAILY
Qty: 90 TABLET | Refills: 3 | Status: SHIPPED | OUTPATIENT
Start: 2020-06-11 | End: 2021-05-17

## 2020-06-11 ASSESSMENT — PATIENT HEALTH QUESTIONNAIRE - PHQ9: SUM OF ALL RESPONSES TO PHQ QUESTIONS 1-9: 11

## 2020-06-11 NOTE — PROGRESS NOTES
"Bhavana Castellanos is a 44 year old female who is being evaluated via a billable video visit.      The patient has been notified of following:     \"This video visit will be conducted via a call between you and your physician/provider. We have found that certain health care needs can be provided without the need for an in-person physical exam.  This service lets us provide the care you need with a video conversation.  If a prescription is necessary we can send it directly to your pharmacy.  If lab work is needed we can place an order for that and you can then stop by our lab to have the test done at a later time.    Video visits are billed at different rates depending on your insurance coverage.  Please reach out to your insurance provider with any questions.    If during the course of the call the physician/provider feels a video visit is not appropriate, you will not be charged for this service.\"    Patient has given verbal consent for Video visit? Yes    Will anyone else be joining your video visit? No    Subjective     Bhavana Castellanos is a 44 year old female who presents today via video visit for the following health issues:    HPI  Chief Complaint   Patient presents with     Thyroid Disease     history of hypothyroid. Needing refills now.     Bhavana is due her thyroid recheck.  Feeling healthy today.    Thyroid:  Taking her meds as prescribed.  She is due a recheck now.  Labs were done recently and her thyroid levels were good.  She needs refills today.     Vitamin D:  Recent level was a little low.  She takes 5000 international unit(s) approximately every other day.    Situational anxiety:  Related to coronavirus pandemic, sociallyisolating, rioting, etc.  Sometimes having panic attacks, \"but I know they are panic attacks.\"  Taking benadryl at night to help herself sleep and she is wondering if it is okay to continue benadryl nightly.  She feels rested in the mornings.     Video Start Time: 8:05 AM        Patient " Active Problem List   Diagnosis     Acquired hypothyroidism     CARDIOVASCULAR SCREENING; LDL GOAL LESS THAN 100     Joint pain     Moderate major depression (H)     Vitamin D deficiency disease     Past Surgical History:   Procedure Laterality Date     ENT SURGERY      wisdom teeth removal     EXCISE LESION HEAD N/A 7/17/2017    Procedure: EXCISE LESION HEAD;  (LOCAL) EXCISION OF RIGHT SCALP MASS AND LEFT POSTERIOR SCALP MASS x2;  Surgeon: Lori Acosta MD;  Location: [facility]     HEAD & NECK SURGERY  7/2018    cysts removed from scalp       Social History     Tobacco Use     Smoking status: Never Smoker     Smokeless tobacco: Never Used   Substance Use Topics     Alcohol use: Yes     Comment: once per weeks      Family History   Problem Relation Age of Onset     Cancer Father         kidney cancer: in remission     Other Cancer Father         Kidney     Depression Father      Other Cancer Maternal Grandmother         Bladder     Thyroid Disease Maternal Grandmother      Hypertension Maternal Grandfather      Diabetes Maternal Grandfather      Other Cancer Paternal Grandfather      Hypertension Brother          Current Outpatient Medications   Medication Sig Dispense Refill     levothyroxine (SYNTHROID/LEVOTHROID) 112 MCG tablet Take 1 tablet (112 mcg) by mouth daily 90 tablet 3     levothyroxine (SYNTHROID/LEVOTHROID) 112 MCG tablet Take 1 tablet (112 mcg) by mouth daily 90 tablet 2     MULTIVITAMIN CHEW   OR take one per day       Omega-3 Fatty Acids (FISH OIL PO) Take 1 g by mouth daily        VITAMIN D 2000 UNIT OR TABS one tablet daily       MILK THISTLE PO Take 1 tablet by mouth daily        Allergies   Allergen Reactions     Amoxicillin Rash     Recent Labs   Lab Test 05/29/20  0907 06/18/19  0916 06/15/18  0946  01/16/17  1029   LDL 93 89 67   < >  --    HDL 43* 41* 51   < >  --    TRIG 115 132 87   < >  --    ALT  --   --  36  --  40   CR  --   --  0.68  --  0.75   GFRESTIMATED  --   --  >90  --  86  "  GFRESTBLACK  --   --  >90  --  >90  African American GFR Calc     POTASSIUM  --   --  4.2  --  3.9   TSH 1.37 3.14 1.28   < >  --     < > = values in this interval not displayed.      BP Readings from Last 3 Encounters:   06/18/19 113/72   06/15/18 125/72   02/28/18 130/83    Wt Readings from Last 3 Encounters:   06/18/19 88.5 kg (195 lb)   06/15/18 88.3 kg (194 lb 9.6 oz)   02/28/18 82.6 kg (182 lb)             Reviewed and updated as needed this visit by Provider         Review of Systems   Constitutional, HEENT, cardiovascular, pulmonary, GI, , musculoskeletal, neuro, skin, endocrine and psych systems are negative, except as otherwise noted.      Objective    LMP 05/01/2020 (Exact Date)   Estimated body mass index is 33.47 kg/m  as calculated from the following:    Height as of 6/18/19: 1.626 m (5' 4\").    Weight as of 6/18/19: 88.5 kg (195 lb).  Physical Exam     GENERAL: Healthy, alert and no distress  EYES: Eyes grossly normal to inspection.  No discharge or erythema, or obvious scleral/conjunctival abnormalities.  RESP: No audible wheeze, cough, or visible cyanosis.  No visible retractions or increased work of breathing.    SKIN: Visible skin clear. No significant rash, abnormal pigmentation or lesions.  NEURO: Cranial nerves grossly intact.  Mentation and speech appropriate for age.  PSYCH: Mentation appears normal, affect normal/bright, judgement and insight intact, normal speech and appearance well-groomed.      Diagnostic Test Results:  Labs reviewed in Epic        Assessment & Plan     (E03.9) Acquired hypothyroidism  (primary encounter diagnosis)  Comment: stable  Plan: levothyroxine (SYNTHROID/LEVOTHROID) 112 MCG         tablet        Refills given.  Yearly clinic appointments/lab levels for refills, sooner prn.     (F41.8) Situational anxiety  Comment: worse  Plan: MENTAL HEALTH REFERRAL  - Adult; Outpatient         Treatment; Individual/Couples/Family/Group         Therapy/Health Psychology; [facility]: " "[facility] [phone]; We will         contact you to schedule the appointment or         please call with any questions        For now, she will take care of herself, take benadryl or melatonin at night for sleep.  She will start with therapy.  In the event that her mood worsens or is not improved with therapy and a healthy night sleep, she will do another visit with me and we will consider medications for anxiety.  Self calming techniques were encouraged.       BMI:   Estimated body mass index is 33.47 kg/m  as calculated from the following:    Height as of 6/18/19: 1.626 m (5' 4\").    Weight as of 6/18/19: 88.5 kg (195 lb).       See Patient Instructions    Return in about 1 year (around 6/11/2021) for Medication follow up, Physical Exam.    CLEVELAND Prado CNP  [facility]      Video-Visit Details    Type of service:  Video Visit    Video End Time:8:16 AM    Originating Location (pt. Location): Home    Distant Location (provider location):  [facility]     Platform used for Video Visit: Doximity    No follow-ups on file.       CLEVELAND Prado CNP        "

## 2020-06-15 ENCOUNTER — OFFICE VISIT - HEALTHEAST (OUTPATIENT)
Dept: BEHAVIORAL HEALTH | Facility: CLINIC | Age: 45
End: 2020-06-15

## 2020-06-15 DIAGNOSIS — F43.23 ADJUSTMENT REACTION WITH ANXIETY AND DEPRESSION: ICD-10-CM

## 2020-06-15 ASSESSMENT — ANXIETY QUESTIONNAIRES
2. NOT BEING ABLE TO STOP OR CONTROL WORRYING: MORE THAN HALF THE DAYS
GAD7 TOTAL SCORE: 13
5. BEING SO RESTLESS THAT IT IS HARD TO SIT STILL: SEVERAL DAYS
6. BECOMING EASILY ANNOYED OR IRRITABLE: SEVERAL DAYS
7. FEELING AFRAID AS IF SOMETHING AWFUL MIGHT HAPPEN: MORE THAN HALF THE DAYS
3. WORRYING TOO MUCH ABOUT DIFFERENT THINGS: NEARLY EVERY DAY
IF YOU CHECKED OFF ANY PROBLEMS ON THIS QUESTIONNAIRE, HOW DIFFICULT HAVE THESE PROBLEMS MADE IT FOR YOU TO DO YOUR WORK, TAKE CARE OF THINGS AT HOME, OR GET ALONG WITH OTHER PEOPLE: VERY DIFFICULT
4. TROUBLE RELAXING: MORE THAN HALF THE DAYS
1. FEELING NERVOUS, ANXIOUS, OR ON EDGE: MORE THAN HALF THE DAYS

## 2020-06-15 ASSESSMENT — PATIENT HEALTH QUESTIONNAIRE - PHQ9: SUM OF ALL RESPONSES TO PHQ QUESTIONS 1-9: 10

## 2020-06-29 ENCOUNTER — OFFICE VISIT - HEALTHEAST (OUTPATIENT)
Dept: BEHAVIORAL HEALTH | Facility: CLINIC | Age: 45
End: 2020-06-29

## 2020-06-29 DIAGNOSIS — F43.23 ADJUSTMENT DISORDER WITH MIXED ANXIETY AND DEPRESSED MOOD: ICD-10-CM

## 2020-07-16 ENCOUNTER — OFFICE VISIT - HEALTHEAST (OUTPATIENT)
Dept: BEHAVIORAL HEALTH | Facility: CLINIC | Age: 45
End: 2020-07-16

## 2020-07-16 DIAGNOSIS — F43.23 ADJUSTMENT DISORDER WITH MIXED ANXIETY AND DEPRESSED MOOD: ICD-10-CM

## 2020-08-03 ENCOUNTER — OFFICE VISIT - HEALTHEAST (OUTPATIENT)
Dept: BEHAVIORAL HEALTH | Facility: CLINIC | Age: 45
End: 2020-08-03

## 2020-08-03 DIAGNOSIS — F43.23 ADJUSTMENT DISORDER WITH MIXED ANXIETY AND DEPRESSED MOOD: ICD-10-CM

## 2020-08-24 ENCOUNTER — OFFICE VISIT - HEALTHEAST (OUTPATIENT)
Dept: BEHAVIORAL HEALTH | Facility: CLINIC | Age: 45
End: 2020-08-24

## 2020-08-24 ENCOUNTER — AMBULATORY - HEALTHEAST (OUTPATIENT)
Dept: BEHAVIORAL HEALTH | Facility: CLINIC | Age: 45
End: 2020-08-24

## 2020-08-24 DIAGNOSIS — F43.23 ADJUSTMENT DISORDER WITH MIXED ANXIETY AND DEPRESSED MOOD: ICD-10-CM

## 2020-09-14 ENCOUNTER — OFFICE VISIT - HEALTHEAST (OUTPATIENT)
Dept: BEHAVIORAL HEALTH | Facility: CLINIC | Age: 45
End: 2020-09-14

## 2020-09-14 DIAGNOSIS — F43.23 ADJUSTMENT DISORDER WITH MIXED ANXIETY AND DEPRESSED MOOD: ICD-10-CM

## 2020-09-30 ENCOUNTER — OFFICE VISIT (OUTPATIENT)
Dept: FAMILY MEDICINE | Facility: CLINIC | Age: 45
End: 2020-09-30
Payer: COMMERCIAL

## 2020-09-30 VITALS
BODY MASS INDEX: 32.27 KG/M2 | WEIGHT: 189 LBS | TEMPERATURE: 98.4 F | HEIGHT: 64 IN | HEART RATE: 74 BPM | SYSTOLIC BLOOD PRESSURE: 120 MMHG | DIASTOLIC BLOOD PRESSURE: 78 MMHG | OXYGEN SATURATION: 99 % | RESPIRATION RATE: 16 BRPM

## 2020-09-30 DIAGNOSIS — F32.1 MODERATE MAJOR DEPRESSION (H): ICD-10-CM

## 2020-09-30 DIAGNOSIS — Z23 NEED FOR PROPHYLACTIC VACCINATION AND INOCULATION AGAINST INFLUENZA: ICD-10-CM

## 2020-09-30 DIAGNOSIS — R10.11 RUQ ABDOMINAL PAIN: Primary | ICD-10-CM

## 2020-09-30 DIAGNOSIS — F41.8 SITUATIONAL ANXIETY: ICD-10-CM

## 2020-09-30 LAB
BASOPHILS # BLD AUTO: 0 10E9/L (ref 0–0.2)
BASOPHILS NFR BLD AUTO: 0.3 %
DIFFERENTIAL METHOD BLD: NORMAL
EOSINOPHIL # BLD AUTO: 0.3 10E9/L (ref 0–0.7)
EOSINOPHIL NFR BLD AUTO: 3.7 %
ERYTHROCYTE [DISTWIDTH] IN BLOOD BY AUTOMATED COUNT: 12.3 % (ref 10–15)
HCT VFR BLD AUTO: 40.7 % (ref 35–47)
HGB BLD-MCNC: 13.9 G/DL (ref 11.7–15.7)
LIPASE SERPL-CCNC: 142 U/L (ref 73–393)
LYMPHOCYTES # BLD AUTO: 2.3 10E9/L (ref 0.8–5.3)
LYMPHOCYTES NFR BLD AUTO: 24.9 %
MCH RBC QN AUTO: 31.8 PG (ref 26.5–33)
MCHC RBC AUTO-ENTMCNC: 34.2 G/DL (ref 31.5–36.5)
MCV RBC AUTO: 93 FL (ref 78–100)
MONOCYTES # BLD AUTO: 0.6 10E9/L (ref 0–1.3)
MONOCYTES NFR BLD AUTO: 6.6 %
NEUTROPHILS # BLD AUTO: 5.8 10E9/L (ref 1.6–8.3)
NEUTROPHILS NFR BLD AUTO: 64.5 %
PLATELET # BLD AUTO: 236 10E9/L (ref 150–450)
RBC # BLD AUTO: 4.37 10E12/L (ref 3.8–5.2)
WBC # BLD AUTO: 9 10E9/L (ref 4–11)

## 2020-09-30 PROCEDURE — 85025 COMPLETE CBC W/AUTO DIFF WBC: CPT | Performed by: NURSE PRACTITIONER

## 2020-09-30 PROCEDURE — 90686 IIV4 VACC NO PRSV 0.5 ML IM: CPT | Performed by: NURSE PRACTITIONER

## 2020-09-30 PROCEDURE — 36415 COLL VENOUS BLD VENIPUNCTURE: CPT | Performed by: NURSE PRACTITIONER

## 2020-09-30 PROCEDURE — 90471 IMMUNIZATION ADMIN: CPT | Performed by: NURSE PRACTITIONER

## 2020-09-30 PROCEDURE — 80053 COMPREHEN METABOLIC PANEL: CPT | Performed by: NURSE PRACTITIONER

## 2020-09-30 PROCEDURE — 99214 OFFICE O/P EST MOD 30 MIN: CPT | Mod: 25 | Performed by: NURSE PRACTITIONER

## 2020-09-30 PROCEDURE — 82150 ASSAY OF AMYLASE: CPT | Performed by: NURSE PRACTITIONER

## 2020-09-30 PROCEDURE — 83690 ASSAY OF LIPASE: CPT | Performed by: NURSE PRACTITIONER

## 2020-09-30 ASSESSMENT — MIFFLIN-ST. JEOR: SCORE: 1497.3

## 2020-09-30 NOTE — PROGRESS NOTES
"Subjective     Bhavana Castellanos is a 44 year old female who presents to clinic today for the following health issues:    HPI       Abdominal/Flank Pain  Onset/Duration: early September   Description:   Bhavana has had problems with RUQ pain that started early September.  The area feels sore, full, sometimes gassy.  Last night the pain escalated.  Now, the pain \"feels like there is a fist pushing on my abd.\"  Feels bruised, sore.  \"not a sharp pain.\"      Character: Dull ache  Location: right upper quadrant  Radiation: Back  Intensity: moderate  Progression of Symptoms:  worsening  Accompanying Signs & Symptoms:  Fever/Chills: no  Gas/Bloating: no  Nausea: no  Vomitting: no  Diarrhea: no  Constipation: YES- last week, took senna helped   Dysuria or Hematuria: no  History:   Trauma: no  Previous similar pain: YES- a few years ago  Previous tests done: Ultrasound few years, had cyst on liver   Precipitating factors:   Does the pain change with:     Food: no    Bowel Movement: no but gets more irritated     Urination: no   Other factors:  no  Therapies tried and outcome: None  No LMP recorded. (Menstrual status: Irregular Periods). LMP: early September       Mood:  Is much improved.  She goes to psychotherapy regularly.  She does not take medications.      Review of Systems   Constitutional, HEENT, cardiovascular, pulmonary, GI, , musculoskeletal, neuro, skin, endocrine and psych systems are negative, except as otherwise noted.      Objective    /78 (BP Location: Right arm, Patient Position: Sitting, Cuff Size: Adult Regular)   Pulse 74   Temp 98.4  F (36.9  C) (Oral)   Resp 16   Ht 1.626 m (5' 4\")   Wt 85.7 kg (189 lb)   LMP 09/30/2020   SpO2 99%   BMI 32.44 kg/m    Body mass index is 32.44 kg/m .  Physical Exam   GENERAL: healthy, alert and no distress  EYES: Eyes grossly normal to inspection, PERRL and conjunctivae and sclerae normal  NECK: no adenopathy, no asymmetry, masses, or scars and thyroid " normal to palpation  RESP: lungs clear to auscultation - no rales, rhonchi or wheezes  CV: regular rate and rhythm, normal S1 S2, no S3 or S4, no murmur, click or rub, no peripheral edema and peripheral pulses strong  ABDOMEN: soft, nontender, no hepatosplenomegaly, no masses and bowel sounds normal.  No rebound or guarding  MS: ambulatory with a steady gait.  SKIN: Warm and dry  NEURO: Normal strength and tone, mentation intact and speech normal  PSYCH: mentation appears normal, affect normal/bright    Diagnostics:  Previous lab and imaging results were reviewed in [facility].    Lab studies and imaging were ordered today, results pending        Assessment & Plan     (R10.11) RUQ abdominal pain  (primary encounter diagnosis)  Comment: Etiology uncertain  Plan: US Abdomen Limited, NM Hepatobiliary Scan w GB         EF, CBC with platelets and differential,         Comprehensive metabolic panel (BMP + Alb, Alk         Phos, ALT, AST, Total. Bili, TP), Amylase,         Lipase        I discussed with Leatha that her lab test results and imaging studies from a couple of years ago did not show acute gallbladder issues, nonetheless her pain came back.  She agrees with rechecking blood work.  I also ordered an ultrasound of the right upper quadrant to review her liver and gallbladder and to compare with previous studies.  I told her that the ultrasound shows structure, gallstones etc.  I also ordered a HIDA scan this time.  I told her that this was for gallbladder function and she thinks this is a great idea.  In the meantime, she will eat a bland diet.  Encouraged her to push fluids.  I encouraged her to reduce caffeine intake to 1 serving a day and to avoid alcohol.  In the event that she develops any concerning symptoms, intractable pain etc., she is to go the ER.  She is appreciative.    (F32.1) Moderate major depression (H)  Comment: Chronic/controlled  Plan: Continue psychotherapy    (F41.8) Situational anxiety  Comment:  "Chronic/controlled  Plan: Continue psychotherapy    (Z23) Need for prophylactic vaccination and inoculation against influenza  Comment: Routine  Plan: INFLUENZA VACCINE IM > 6 MONTHS VALENT IIV4         [56086], Vaccine Administration, Initial         [05070]        Given today     BMI:   Estimated body mass index is 32.44 kg/m  as calculated from the following:    Height as of this encounter: 1.626 m (5' 4\").    Weight as of this encounter: 85.7 kg (189 lb).   Weight management plan: Discussed healthy diet and exercise guidelines        See Patient Instructions    Return in about 5 days (around 10/5/2020), or if symptoms worsen or fail to improve.    CLEVELAND Prado [facility]      "

## 2020-09-30 NOTE — PATIENT INSTRUCTIONS
"Patient Education     [place] Diet  Your healthcare provider may recommend a bland diet if you have an upset stomach. It consists of foods that are mild and easy to digest. It is better to eat small frequent meals rather than 3 large meals a day.    Beverages  OK: Fruit juices, non-caffeinated teas and coffee, non-carbonated monahan  Avoid: Carbonated beverage, caffeinated tea and coffee, all alcoholic beverages  Bread  OK: Refined white, wheat or rye bread, liberty or soda crackers, Isa toast, plain rolls, bagels  Avoid: Whole-grain bread  Cereal  OK: Refined cereals: cooked or ready to eat  Avoid: Whole-grain cereals and granola, or those containing bran, seeds or nuts  Desserts  OK: Peanut butter and all others except those to \"avoid\"  Avoid: Chocolate, cocoa, coconut, popcorn, nuts, seeds, jam, marmalade  Fruits  OK: Canned, cooked, frozen or fresh fruits without seeds or tough skin  Avoid: Olives, skin and seeds of fruit, dried fruit  Meats  OK: All fresh or preserved meat, fish and fowl  Avoid: Any that are prepared with those spices to \"avoid\"  Cheese and eggs  OK: Eggs, cottage cheese, cream cheese, other cheeses  Avoid: All cheeses made with those spices to \"avoid\"  Potatoes and pasta  OK: Potato, rice, macaroni, noodles, spaghetti  Avoid: None  Soups  OK: All soups without heavy seasoning  Avoid: Soups made with those spices to \"avoid\"  Vegetables  OK: Canned, cooked, fresh or frozen mildly flavored vegetables without seeds, skins or coarse fiber  Avoid: Vegetables prepared with those spices to \"avoid\"; skin and seeds of vegetables and those with coarse fiber, broccoli, cabbage, cauliflower, cucumber, green peppers, and corn  Spices  OK: Salt, lemon and limejuice, vinegar, all extracts, dain, cinnamon, thyme, mace, allspice, paprika  Avoid: Chili powder, cloves, pepper, seed spices, garlic, gravy pickles, highly seasoned salad dressings  Date Last Reviewed: 5/1/2018 2000-2019 [employer] " [address] [place] [address]. All rights reserved. This information is not intended as a substitute for professional medical care. Always follow your healthcare professional's instructions.

## 2020-10-01 LAB
ALBUMIN SERPL-MCNC: 3.8 G/DL (ref 3.4–5)
ALP SERPL-CCNC: 72 U/L (ref 40–150)
ALT SERPL W P-5'-P-CCNC: 20 U/L (ref 0–50)
AMYLASE SERPL-CCNC: 37 U/L (ref 30–110)
ANION GAP SERPL CALCULATED.3IONS-SCNC: 8 MMOL/L (ref 3–14)
AST SERPL W P-5'-P-CCNC: 8 U/L (ref 0–45)
BILIRUB SERPL-MCNC: 0.4 MG/DL (ref 0.2–1.3)
BUN SERPL-MCNC: 11 MG/DL (ref 7–30)
CALCIUM SERPL-MCNC: 8.9 MG/DL (ref 8.5–10.1)
CHLORIDE SERPL-SCNC: 109 MMOL/L (ref 94–109)
CO2 SERPL-SCNC: 21 MMOL/L (ref 20–32)
CREAT SERPL-MCNC: 0.97 MG/DL (ref 0.52–1.04)
GFR SERPL CREATININE-BSD FRML MDRD: 71 ML/MIN/{1.73_M2}
GLUCOSE SERPL-MCNC: 87 MG/DL (ref 70–99)
POTASSIUM SERPL-SCNC: 4.1 MMOL/L (ref 3.4–5.3)
PROT SERPL-MCNC: 7.6 G/DL (ref 6.8–8.8)
SODIUM SERPL-SCNC: 138 MMOL/L (ref 133–144)

## 2020-10-01 NOTE — RESULT ENCOUNTER NOTE
Lori Bateman,    This note is to let you know that all of your blood test results look great.  I will let you know the imaging results when I see them.  I hope you are feeling better!    Ana GUTHRIE CNP

## 2020-10-02 ENCOUNTER — HOSPITAL ENCOUNTER (OUTPATIENT)
Dept: NUCLEAR MEDICINE | Facility: CLINIC | Age: 45
Setting detail: NUCLEAR MEDICINE
Discharge: HOME | End: 2020-10-02
Attending: NURSE PRACTITIONER | Admitting: NURSE PRACTITIONER
Payer: COMMERCIAL

## 2020-10-02 ENCOUNTER — HOSPITAL ENCOUNTER (OUTPATIENT)
Dept: ULTRASOUND IMAGING | Facility: CLINIC | Age: 45
Discharge: HOME | End: 2020-10-02
Attending: NURSE PRACTITIONER | Admitting: NURSE PRACTITIONER
Payer: COMMERCIAL

## 2020-10-02 DIAGNOSIS — R10.11 RUQ ABDOMINAL PAIN: ICD-10-CM

## 2020-10-02 PROCEDURE — 78227 HEPATOBIL SYST IMAGE W/DRUG: CPT

## 2020-10-02 PROCEDURE — A9537 TC99M MEBROFENIN: HCPCS | Performed by: NURSE PRACTITIONER

## 2020-10-02 PROCEDURE — 343N000001 HC RX 343: Performed by: NURSE PRACTITIONER

## 2020-10-02 PROCEDURE — 76705 ECHO EXAM OF ABDOMEN: CPT

## 2020-10-02 PROCEDURE — 76705 ECHO EXAM OF ABDOMEN: CPT | Mod: 26 | Performed by: RADIOLOGY

## 2020-10-02 PROCEDURE — 250N000011 HC RX IP 250 OP 636: Performed by: NURSE PRACTITIONER

## 2020-10-02 PROCEDURE — 78227 HEPATOBIL SYST IMAGE W/DRUG: CPT | Mod: 26 | Performed by: RADIOLOGY

## 2020-10-02 RX ORDER — KIT FOR THE PREPARATION OF TECHNETIUM TC 99M MEBROFENIN 45 MG/10ML
1-7.2 INJECTION, POWDER, LYOPHILIZED, FOR SOLUTION INTRAVENOUS ONCE
Status: COMPLETED | OUTPATIENT
Start: 2020-10-02 | End: 2020-10-02

## 2020-10-02 RX ADMIN — MEBROFENIN 7 MILLICURIE: 45 INJECTION, POWDER, LYOPHILIZED, FOR SOLUTION INTRAVENOUS at 08:30

## 2020-10-02 RX ADMIN — SINCALIDE 1.7 MCG: 5 INJECTION, POWDER, LYOPHILIZED, FOR SOLUTION INTRAVENOUS at 09:22

## 2020-10-05 ENCOUNTER — MYC MEDICAL ADVICE (OUTPATIENT)
Dept: FAMILY MEDICINE | Facility: CLINIC | Age: 45
End: 2020-10-05

## 2020-10-05 ENCOUNTER — OFFICE VISIT - HEALTHEAST (OUTPATIENT)
Dept: BEHAVIORAL HEALTH | Facility: CLINIC | Age: 45
End: 2020-10-05

## 2020-10-05 DIAGNOSIS — R10.11 RUQ ABDOMINAL PAIN: Primary | ICD-10-CM

## 2020-10-05 DIAGNOSIS — F43.23 ADJUSTMENT DISORDER WITH MIXED ANXIETY AND DEPRESSED MOOD: ICD-10-CM

## 2020-10-05 NOTE — RESULT ENCOUNTER NOTE
Lori Bateman,    This note is to let you know that your gallbladder function is normal. There is no evidence of slowing.    Let me know if you have any questions.    Ana GUTHRIE CNP

## 2020-10-05 NOTE — TELEPHONE ENCOUNTER
Started gastro referral for consult.  Please advise as to where you would like patient to try to schedule - [facility] vs [facility].  Loretta Young RN

## 2020-10-15 ENCOUNTER — TRANSFERRED RECORDS (OUTPATIENT)
Dept: HEALTH INFORMATION MANAGEMENT | Facility: CLINIC | Age: 45
End: 2020-10-15

## 2020-10-20 ENCOUNTER — OFFICE VISIT - HEALTHEAST (OUTPATIENT)
Dept: BEHAVIORAL HEALTH | Facility: CLINIC | Age: 45
End: 2020-10-20

## 2020-10-20 DIAGNOSIS — F43.23 ADJUSTMENT DISORDER WITH MIXED ANXIETY AND DEPRESSED MOOD: ICD-10-CM

## 2020-10-21 ENCOUNTER — TRANSFERRED RECORDS (OUTPATIENT)
Dept: HEALTH INFORMATION MANAGEMENT | Facility: CLINIC | Age: 45
End: 2020-10-21

## 2020-10-29 ENCOUNTER — TRANSFERRED RECORDS (OUTPATIENT)
Dept: HEALTH INFORMATION MANAGEMENT | Facility: CLINIC | Age: 45
End: 2020-10-29

## 2020-11-17 ENCOUNTER — OFFICE VISIT - HEALTHEAST (OUTPATIENT)
Dept: BEHAVIORAL HEALTH | Facility: CLINIC | Age: 45
End: 2020-11-17

## 2020-11-17 DIAGNOSIS — F43.23 ADJUSTMENT DISORDER WITH MIXED ANXIETY AND DEPRESSED MOOD: ICD-10-CM

## 2020-12-10 ENCOUNTER — OFFICE VISIT - HEALTHEAST (OUTPATIENT)
Dept: BEHAVIORAL HEALTH | Facility: CLINIC | Age: 45
End: 2020-12-10

## 2020-12-10 ENCOUNTER — AMBULATORY - HEALTHEAST (OUTPATIENT)
Dept: BEHAVIORAL HEALTH | Facility: CLINIC | Age: 45
End: 2020-12-10

## 2020-12-10 DIAGNOSIS — F43.23 ADJUSTMENT DISORDER WITH MIXED ANXIETY AND DEPRESSED MOOD: ICD-10-CM

## 2021-01-05 ENCOUNTER — TRANSFERRED RECORDS (OUTPATIENT)
Dept: HEALTH INFORMATION MANAGEMENT | Facility: CLINIC | Age: 46
End: 2021-01-05

## 2021-01-15 ENCOUNTER — HEALTH MAINTENANCE LETTER (OUTPATIENT)
Age: 46
End: 2021-01-15

## 2021-01-25 ENCOUNTER — OFFICE VISIT - HEALTHEAST (OUTPATIENT)
Dept: BEHAVIORAL HEALTH | Facility: CLINIC | Age: 46
End: 2021-01-25

## 2021-01-25 DIAGNOSIS — F43.23 ADJUSTMENT DISORDER WITH MIXED ANXIETY AND DEPRESSED MOOD: ICD-10-CM

## 2021-01-25 ASSESSMENT — ANXIETY QUESTIONNAIRES
IF YOU CHECKED OFF ANY PROBLEMS ON THIS QUESTIONNAIRE, HOW DIFFICULT HAVE THESE PROBLEMS MADE IT FOR YOU TO DO YOUR WORK, TAKE CARE OF THINGS AT HOME, OR GET ALONG WITH OTHER PEOPLE: SOMEWHAT DIFFICULT
4. TROUBLE RELAXING: SEVERAL DAYS
6. BECOMING EASILY ANNOYED OR IRRITABLE: NOT AT ALL
5. BEING SO RESTLESS THAT IT IS HARD TO SIT STILL: NOT AT ALL
2. NOT BEING ABLE TO STOP OR CONTROL WORRYING: SEVERAL DAYS
7. FEELING AFRAID AS IF SOMETHING AWFUL MIGHT HAPPEN: SEVERAL DAYS
3. WORRYING TOO MUCH ABOUT DIFFERENT THINGS: SEVERAL DAYS
1. FEELING NERVOUS, ANXIOUS, OR ON EDGE: SEVERAL DAYS
GAD7 TOTAL SCORE: 5

## 2021-01-25 ASSESSMENT — PATIENT HEALTH QUESTIONNAIRE - PHQ9: SUM OF ALL RESPONSES TO PHQ QUESTIONS 1-9: 2

## 2021-02-22 ENCOUNTER — OFFICE VISIT - HEALTHEAST (OUTPATIENT)
Dept: BEHAVIORAL HEALTH | Facility: CLINIC | Age: 46
End: 2021-02-22

## 2021-02-22 DIAGNOSIS — F43.22 ADJUSTMENT DISORDER WITH ANXIETY: ICD-10-CM

## 2021-03-22 ENCOUNTER — OFFICE VISIT - HEALTHEAST (OUTPATIENT)
Dept: BEHAVIORAL HEALTH | Facility: CLINIC | Age: 46
End: 2021-03-22

## 2021-03-22 DIAGNOSIS — F43.23 ADJUSTMENT DISORDER WITH MIXED ANXIETY AND DEPRESSED MOOD: ICD-10-CM

## 2021-04-05 ENCOUNTER — OFFICE VISIT - HEALTHEAST (OUTPATIENT)
Dept: BEHAVIORAL HEALTH | Facility: CLINIC | Age: 46
End: 2021-04-05

## 2021-04-05 DIAGNOSIS — F43.23 ADJUSTMENT DISORDER WITH MIXED ANXIETY AND DEPRESSED MOOD: ICD-10-CM

## 2021-04-12 ENCOUNTER — AMBULATORY - HEALTHEAST (OUTPATIENT)
Dept: BEHAVIORAL HEALTH | Facility: CLINIC | Age: 46
End: 2021-04-12

## 2021-04-12 DIAGNOSIS — F43.23 ADJUSTMENT DISORDER WITH MIXED ANXIETY AND DEPRESSED MOOD: ICD-10-CM

## 2021-04-21 ENCOUNTER — ANCILLARY PROCEDURE (OUTPATIENT)
Dept: GENERAL RADIOLOGY | Facility: CLINIC | Age: 46
End: 2021-04-21
Attending: FAMILY MEDICINE
Payer: COMMERCIAL

## 2021-04-21 ENCOUNTER — OFFICE VISIT (OUTPATIENT)
Dept: URGENT CARE | Facility: URGENT CARE | Age: 46
End: 2021-04-21
Payer: COMMERCIAL

## 2021-04-21 VITALS
OXYGEN SATURATION: 99 % | SYSTOLIC BLOOD PRESSURE: 134 MMHG | DIASTOLIC BLOOD PRESSURE: 78 MMHG | TEMPERATURE: 98 F | HEART RATE: 77 BPM

## 2021-04-21 DIAGNOSIS — M25.472 ANKLE EDEMA, BILATERAL: ICD-10-CM

## 2021-04-21 DIAGNOSIS — M25.471 ANKLE EDEMA, BILATERAL: ICD-10-CM

## 2021-04-21 DIAGNOSIS — M25.571 ACUTE RIGHT ANKLE PAIN: Primary | ICD-10-CM

## 2021-04-21 LAB
ALBUMIN SERPL-MCNC: 3.9 G/DL (ref 3.4–5)
ALP SERPL-CCNC: 65 U/L (ref 40–150)
ALT SERPL W P-5'-P-CCNC: 30 U/L (ref 0–50)
ANION GAP SERPL CALCULATED.3IONS-SCNC: 5 MMOL/L (ref 3–14)
AST SERPL W P-5'-P-CCNC: 15 U/L (ref 0–45)
BILIRUB SERPL-MCNC: 0.3 MG/DL (ref 0.2–1.3)
BUN SERPL-MCNC: 15 MG/DL (ref 7–30)
CALCIUM SERPL-MCNC: 9.2 MG/DL (ref 8.5–10.1)
CHLORIDE SERPL-SCNC: 109 MMOL/L (ref 94–109)
CO2 SERPL-SCNC: 23 MMOL/L (ref 20–32)
CREAT SERPL-MCNC: 0.82 MG/DL (ref 0.52–1.04)
ERYTHROCYTE [DISTWIDTH] IN BLOOD BY AUTOMATED COUNT: 12.9 % (ref 10–15)
GFR SERPL CREATININE-BSD FRML MDRD: 87 ML/MIN/{1.73_M2}
GLUCOSE SERPL-MCNC: 90 MG/DL (ref 70–99)
HCT VFR BLD AUTO: 41.9 % (ref 35–47)
HGB BLD-MCNC: 13.9 G/DL (ref 11.7–15.7)
MCH RBC QN AUTO: 30.6 PG (ref 26.5–33)
MCHC RBC AUTO-ENTMCNC: 33.2 G/DL (ref 31.5–36.5)
MCV RBC AUTO: 92 FL (ref 78–100)
PLATELET # BLD AUTO: 236 10E9/L (ref 150–450)
POTASSIUM SERPL-SCNC: 4.2 MMOL/L (ref 3.4–5.3)
PROT SERPL-MCNC: 7.4 G/DL (ref 6.8–8.8)
RBC # BLD AUTO: 4.54 10E12/L (ref 3.8–5.2)
SODIUM SERPL-SCNC: 138 MMOL/L (ref 133–144)
WBC # BLD AUTO: 6.9 10E9/L (ref 4–11)

## 2021-04-21 PROCEDURE — 85027 COMPLETE CBC AUTOMATED: CPT | Performed by: FAMILY MEDICINE

## 2021-04-21 PROCEDURE — 36415 COLL VENOUS BLD VENIPUNCTURE: CPT | Performed by: FAMILY MEDICINE

## 2021-04-21 PROCEDURE — 99214 OFFICE O/P EST MOD 30 MIN: CPT | Performed by: FAMILY MEDICINE

## 2021-04-21 PROCEDURE — 73610 X-RAY EXAM OF ANKLE: CPT | Mod: RT | Performed by: RADIOLOGY

## 2021-04-21 PROCEDURE — 80053 COMPREHEN METABOLIC PANEL: CPT | Performed by: FAMILY MEDICINE

## 2021-04-21 NOTE — PROGRESS NOTES
Chief Complaint   Patient presents with     Urgent Care     Musculoskeletal Problem     r ankle pain and swellin x 1 day- no known injury       Medical Decision Making:    ASSESMENT AND PLAN     Bhavana was seen today for urgent care and musculoskeletal problem.    Diagnoses and all orders for this visit:    Acute right ankle pain  -     XR Ankle Right G/E 3 Views    Ankle edema, bilateral  -     CBC with platelets  -     Comprehensive metabolic panel (BMP + Alb, Alk Phos, ALT, AST, Total. Bili, TP)        Reviewed with patient with x-ray findings  Discussed with patient there is a possible ligament sprain.  An Ace wrap was applied to the ankle.  Suggested to limit activities that make the pain worse.  Continuing cold packs to area.  If notices any worsening symptoms should follow-up.  I did review with patient about the possibilities for edema CBC was within normal limits CMP is still pending.  Tylenol, Ibuprofen, Fluids and Rest    I have reviewed the nursing notes.  Differential Diagnosis:  MS Injury Pain: sprain, fracture, tendonitis, muscle strain, contusion, dislocation and bursitis      See orders in Epic  Pt verbalized and agreed with the plan and is aware of the worsening symptoms for which would need to follow up .  Pt was stable during time of discharge from the clinic     X-Ray was done, my findings are:no fracture noted       Time  spent on the date of the encounter doing chart review, review of test results, interpretation of tests, patient visit and documentation     If not improving or if condition worsens, follow up with your Primary Care Provider    SUBJECTIVE     Bhavana Castellanos is a 45 year old female presenting with a chief complaint of    Chief Complaint   Patient presents with     Urgent Care     Musculoskeletal Problem     r ankle pain and swellin x 1 day- no known injury           Bhavana Castellanos is a 45 year old female presenting with a chief complaint of bilateral ankle swelling.  She  also has been noticing some tenderness over the right lateral malleolus.  She has noticed edema and swelling for last 1 day.  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  She has no other systemic symptoms.  No change in her diet is normal no UTI symptoms.  She is an established patient of [place] [facility].  Onset of symptoms was 1 day(s) ago.  Course of illness is waxing and waning.    Severity mild  Current and Associated symptoms: Ankle edema and right lateral malleolus tenderness   Treatment measures tried include None tried.  Predisposing factors include None.    Past Medical History:   Diagnosis Date     Depressive disorder 1994     Hypothyroidism      Current Outpatient Medications   Medication Sig Dispense Refill     levothyroxine (SYNTHROID/LEVOTHROID) 112 MCG tablet Take 1 tablet (112 mcg) by mouth daily 90 tablet 3     MELATONIN-THEANINE PO        MILK THISTLE PO Take 1 tablet by mouth daily        MULTIVITAMIN CHEW   OR take one per day       Omega-3 Fatty Acids (FISH OIL PO) Take 1 g by mouth daily        VITAMIN D 2000 UNIT OR TABS one tablet daily       VITAMINS B1 B6 B12 PO        Social History     Tobacco Use     Smoking status: Never Smoker     Smokeless tobacco: Never Used   Substance Use Topics     Alcohol use: Yes     Comment: once per weeks      Family History   Problem Relation Age of Onset     Cancer Father         kidney cancer: in remission     Other Cancer Father         Kidney     Depression Father      Other Cancer Maternal Grandmother         Bladder     Thyroid Disease Maternal Grandmother      Hypertension Maternal Grandfather      Diabetes Maternal Grandfather      Other Cancer Paternal Grandfather      Hypertension Brother          ROS:    10 point ROS of systems including Constitutional, Eyes, Respiratory, Cardiovascular, Gastroenterology, Genitourinary, Integumentary,  Psychiatric ,neurological were all negative except for pertinent positives noted in my HPI          OBJECTIVE:    /78   Pulse 77   Temp 98  F (36.7  C) (Tympanic)   SpO2 99%   Breastfeeding No   GENERAL APPEARANCE: healthy, alert and no distress  EYES: EOMI,  PERRL, conjunctiva clear  HENT: ear canals and TM's normal.  Nose and mouth without ulcers, erythema or lesions  NECK: supple, nontender, no lymphadenopathy  RESP: lungs clear to auscultation - no rales, rhonchi or wheezes  CV: regular rates and rhythm, normal S1 S2, no murmur noted  ABDOMEN:  soft, nontender, no HSM or masses and bowel sounds normal  Ext- there is bilateral ankle edema rt more than left there is definite tenderness over the lateral malleolus   NEURO: Normal strength and tone, sensory exam grossly normal,  normal speech and mentation  SKIN: no suspicious lesions or rashes  PSYCH: mentation appears normal  Physical Exam      (Note was completed, in part, with [employer] voice-recognition software. Documentation reviewed, but some grammatical, spelling, and word errors may remain.)  Idalmis Beckford [email or web address]

## 2021-04-21 NOTE — PATIENT INSTRUCTIONS
Patient Education     Coping with Edema  What is edema?  Edema is the build-up of fluid in the body, which causes swelling. Swelling most commonly occurs in the feet, ankles, lower legs or hands.  Swelling can occur in the belly or chest may be a sign of a more severe problem.  Certain medicines or conditions can make the swelling worse.  Symptoms include:    Feet and lower legs get larger when you sit or walk.    Hands feel tight when you make a fist.    When you push on the skin, skin stays dented.    Shiny, tight skin.    Fast weight gain.  How is it treated?  Your care team may give you a medicine to reduce the swelling.  They may also suggest that you meet with a dietitian. He or she can help with food choices to reduce the swelling.  What can I do about the swelling?    Place your feet above your heart 3 times a day: Sit with your feet up on a stool with a pillow. Sit on the bed or couch with two pillows under your feet.    Do not stand for long periods of time.    Wear loose-fitting clothes.    Do not cross your legs.    Reduce the salt in your diet. These foods are high in salt:  ? Chips, soup  ? Frozen meals, TV dinners  ? [place], lunch meat, ham  ? Sauces (soy, canned spaghetti sauce)    Walk or do other exercise.    Wear compression stockings.    Drink water as normal.    Weigh yourself every day at the same time to keep track of weight gain.  When should I call my care team?  Call your care team if:    You have a hard time breathing.    You gained 5 pounds or more in 1 week.    Your hands or feet feel cold when you touch them.    You are peeing very little or not at all.    Swelling is moving up your arms or legs.    Your tongue is swelling.    You cannot eat for more than a day  If you have any side effects, call us. We can help you manage these problems.  For more information,  see:  www.[facility].com  www.cancer.org/treatment/treatmentsandsideeffects/physicalsideeffects/dealingwithsymptomsathome  www.cancer.gov/cancertopics/coping/chemotherapy-and-you  Comments:  __________________________________________  __________________________________________  __________________________________________  __________________________________________  __________________________________________  __________________________________________  __________________________________________  For informational purposes only. Not to replace the advice of your health care provider.  Copyright   2014 [employer]. All rights reserved. [employer] 328415 - REV 03/16.

## 2021-04-28 ENCOUNTER — OFFICE VISIT - HEALTHEAST (OUTPATIENT)
Dept: BEHAVIORAL HEALTH | Facility: CLINIC | Age: 46
End: 2021-04-28

## 2021-04-28 ENCOUNTER — COMMUNICATION - HEALTHEAST (OUTPATIENT)
Dept: BEHAVIORAL HEALTH | Facility: CLINIC | Age: 46
End: 2021-04-28

## 2021-04-28 DIAGNOSIS — F43.23 ADJUSTMENT DISORDER WITH MIXED ANXIETY AND DEPRESSED MOOD: ICD-10-CM

## 2021-05-26 ENCOUNTER — OFFICE VISIT - HEALTHEAST (OUTPATIENT)
Dept: BEHAVIORAL HEALTH | Facility: CLINIC | Age: 46
End: 2021-05-26

## 2021-05-26 DIAGNOSIS — F43.23 ADJUSTMENT DISORDER WITH MIXED ANXIETY AND DEPRESSED MOOD: ICD-10-CM

## 2021-05-27 ASSESSMENT — PATIENT HEALTH QUESTIONNAIRE - PHQ9
SUM OF ALL RESPONSES TO PHQ QUESTIONS 1-9: 2
SUM OF ALL RESPONSES TO PHQ QUESTIONS 1-9: 10

## 2021-05-28 ASSESSMENT — ANXIETY QUESTIONNAIRES
GAD7 TOTAL SCORE: 5
GAD7 TOTAL SCORE: 13

## 2021-06-08 NOTE — PROGRESS NOTES
Mental Health Visit Note    Patient: Bhavana Castellanos    : 1975 MRN: [id number]    6/15/2020    Start time: 1111   Stop Time: 1156 Session # 1    Session Type: Patient is presenting for an Individual session.    Bhavana Castellanos is a 44 y.o. female is being seen today per referral from her primary provider Ana Rosales [facility].  Patient's chief complaint is that she has been experiencing depressed mood and increased anxiety and panic attacks beginning 2020 after the start of COVID 19 pandemic.  Patient reports that the panic attacks occur primarily prior to her going to sleep at night resulting in decreased difficulty falling asleep and staying asleep followed by tiredness lack of motivation, fatigue and difficulty focusing and concentrating.    Telemedicine Visit: The patient's condition can be safely assessed and treated via synchronous audio and visual telemedicine encounter.      Reason for Telemedicine Visit: Patient has requested telehealth visit and due to the Corona Virus protocol.     Originating Site (Patient Location): Patient's home    Distant Site (Provider Location): Provider Remote Setting    Consent:  The patient/guardian has verbally consented to: the potential risks and benefits of telemedicine (video visit) versus in person care; bill my insurance or make self-payment for services provided; and responsibility for payment of non-covered services. (Yes)    Mode of Communication:  Video Conference via [employer]    As the provider I attest to compliance with applicable laws and regulations related to telemedicine.    Those present for this visit Pt. And Therapist    Current issues: Pt. Indicated that she has been working from home since the end of March due to the social distancing guidelines in place. This has resulted in feeling socially isolation. Pt. Reports a history of experiencing depression, Anxiety and Panic Attacks.  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  Patient indicates that she lives alone with her 2 cats and although, she has numerous friends and acquaintances she worries that if something happened to her note would be would be checking on her status.  Parents and siblings live out of state.  Prior to the pandemic patient had been involved in a number of activities such as sword fighting reenacting history events and belly dancing.  She indicated that the panic attacks come on primarily when she is trying to sleep at night.  Patient reported that she has participated in therapy on different occasions throughout her lifetime.  Last time was a few years ago for approximately 2-3 sessions.    New symptoms or complaints:   Rapid heart pounding, inability to sleep, decreased energy, increased appetite, loss of interest in activities, excessive work, restricted travel from home, distractibility, poor memory, procrastination, worry, anxiousness, nervousness, depressed mood, fevers, emptiness, and decreased socialization.    Functional Impairment:   Personal: 3  Family: 1  Social: 3  Work: 2    Clinical assessment of mental status:   Bhavana Castellanos presented on time.   She was oriented x3, open and cooperative, and dressed appropriately for this session and weather. Her memory was Normal cognitive functioning .  Her speech was  Within normal.  Language was clear. Concentration and focus is Within normal. Psychosis is not noted or reported. She reports her mood appears somewhat flattened affect is congruent with speech and is Congruent w/content of speech.  Fund of knowledge is adequate. Insight is adequate for therapy.    Suicidal/Homicidal Ideation present: Patient denies suicidal and homicidal ideations/means or plans.     Patient's impression of their current status: Patient stated, that she had been doing fairly well when she was more social and able to participate in social events.  Staff thought processing techniques and resources  from anxiety [employer] were discussed with patient.  Patient is willing to develop deep breathing strategies and approaches on a daily basis in order to help diminish and/or eliminate her panic attacks that she has developed primarily in the evening hours.     Therapist impression of patients current state: Because of having a history of depression panic attacks and anxiety the pandemic has diminished her mood and increased panic attacks.  Patient is willing to develop a more structured environment at home for work and social activities.    Assessment tools used today include: PHQ-9, GILLES-7 and C-SSRS (can be found documented in Doc Flowsheets.)     Type of psychotherapeutic technique provided: Insight oriented and Solution-focused    Progress toward short term goals: Not yet established    Review of long term goals Not yet established    Diagnosis: Adjustment disorder with anxiety and depressed mood    Plan and Follow-up:  1. Patient plans to continue taking the medication as prescribed.   2. Patient plans to follow up with therapy in 2 weeks.   3.  Patient will review the anxiety .com website in order to gain skills of            breathing strategies and approaches.    4.  Patient is scheduled for follow-up appointment 6 29- 20  5.  Patient will log thoughts and feelings in  notebook by side of bed.      Discharge Planning Criteria:    I have reviewed the note as documented above.  This accurately captures the substance of my conversation with the patient. As the provider I attest to compliance with applicable laws and regulations related to telemedicine.    Performed and documented by Genet KENNEDY 6/15/20

## 2021-06-09 NOTE — PROGRESS NOTES
Mental Health Visit Note    Patient: Bhavana Castellanos    : 1975 MRN: [id number]    2020    Start time: 1600  Stop Time: 1655   Session # 3    Session Type: Patient is presenting for an Individual session..     Telemedicine Visit: The patient's condition can be safely assessed and treated via synchronous audio and visual telemedicine encounter.      Reason for Telemedicine Visit: Patient has requested telehealth visit    Originating Site (Patient Location): Patient's home    Distant Site (Provider Location): Provider Remote Setting- Home Office    Consent:  The patient/guardian has verbally consented to: the potential risks and benefits of telemedicine (video visit) versus in person care; bill my insurance or make self-payment for services provided; and responsibility for payment of non-covered services.     Mode of Communication:  Video Conference via [employer]    As the provider I attest to compliance with applicable laws and regulations related to telemedicine.    Those present for this visit Patient and Therapist    Follow up Patient stated, that she has been asked to work additional hours since last session took place.  She has noticed that it is been more difficult to complete work assignments and she is waking up at night thinking about the test she has not completed during the day.     New symptoms or complaints: Increased difficulty concentrating and focusing, ruminating thoughts difficulty sleeping and decreased energy.    Functional Impairment:   Personal: 3  Family: 1  Social: 3  Work: 3    Clinical assessment of mental status:   Bhavana Castellanos presented on time.   She was oriented x3, open and cooperative, and dressed appropriately for this session and weather. Her memory was Normal cognitive functioning .  Her speech was  Within normal.  Language was clear.  Concentration and focus is Within normal. Psychosis is not noted or reported. She reports her mood is Congruent w/content of speech.  " Affect is congruent with speech and is Congruent w/content of speech.  Fund of knowledge is adequate. Insight is adequate for therapy.    Suicidal/Homicidal Ideation present: Patient denies suicidal and homicidal ideations/means or plans.     Patient's impression of their current status: Patient stated, 'it seems as though I am having more problems focusing and concentrating during the night workday and that is what is happening.\"  \"And I am waking up at night thinking about everything I have to get done.\"    Therapist impression of patients current state: During today's session we reviewed the number of hours that of increased during her workday at home.  She acknowledged that she is spending more time at the computer rather than breaking up her day and including a regular exercise regime as well as taking frequent breaks from the computer and sitting at her desk.   Patient was amenable to considering a stand-up desk and/or using her Fitbit to track steps.  Solution focused strategies and approaches were identified patient willing to make some positive changes to see if her focusing, difficulty focusing concentrating and energy levels change.    Assessment tools used today include: Global assessment (can be found documented in Doc Flowsheets).     Type of psychotherapeutic technique provided: Insight oriented and Solution-focused    Progress toward short term goals:Pt. Received intake form. She will complete it and return it to writer.    Review of long term goals: Not yet established    Diagnosis: Adjustment reaction with Anxiety and depressed mood.    Plan and Follow-up:   1. Set up a regular schedule to take a 10 min break from computer to           walk/stretch.   2. Patient plans to continue taking the medication as prescribed.   3. Patient plans to follow up with therapy in two weeks.     Discharge Criteria/Planning: Patient will continue with follow-up until therapy can be discontinued without return of " signs and symptoms.    I have reviewed the note as documented above.  This accurately captures the substance of my conversation with the patient.    As the provider I attest to compliance with applicable laws and regulations related to telemedicine.  Performed and documented by   Genet KENNEDY  7/16/20

## 2021-06-09 NOTE — PROGRESS NOTES
Mental Health Visit Note    Patient: Bhavana Castellanos    : 1975 MRN: [id number]    2020    Start time: 1605    Stop Time: 1655  Session #2  Check in time and start time do not match up due to difficulty checking patient in.  Start time is accurate as noted above.    Session Type: Patient is presenting for an Individual session.     Telemedicine Visit: The patient's condition can be safely assessed and treated via synchronous audio and visual telemedicine encounter.  .    Reason for Telemedicine Visit: Patient has requested telehealth visit    Originating Site (Patient Location): Patient's home    Distant Site (Provider Location): Provider Remote Setting    Consent:  The patient/guardian has verbally consented to: the potential risks and benefits of telemedicine (video visit) versus in person care; bill my insurance or make self-payment for services provided; and responsibility for payment of non-covered services. (Yes) patient verbally consented.    Mode of Communication:  Video Conference via [employer]    As the provider I attest to compliance with applicable laws and regulations related to telemedicine.    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  She lives group for approximately 5 years.  She is also making attempts to connect with other friends either through the computer or by phone.  She continues to work from home.    New symptoms or complaints: None    Functional Impairment:   Personal: 3  Family: 2  Social: 3  Work: 0    Clinical assessment of mental status:   Bhavana Castellanos presented on time.   She was oriented x3, open and cooperative, and dressed appropriately for this session and weather. Her memory was Normal cognitive functioning .  Her speech was  Within normal.  Language was clear.  Concentration and focus is Within normal. Psychosis is not noted or reported. She reports her mood  is Congruent w/content of speech.  Affect is congruent with speech and is Congruent w/content of speech.  Fund of knowl.edge is adequate. Insight is adequate for therapy.    Suicidal/Homicidal Ideation present: Patient denies suicidal and homicidal ideations/means or plans.     Patient's impression of their current status: Patient indicated that she is making attempts to increase socialization even at bedside by Internet being that she works from home and is feeling very isolated.  She is also making attempts to increase her activities such as walking in the morning prior to work and in between times if possible.    Therapist impression of patients current state: This 44 y.o. White or  female presents with issues related to social isolation as result of the coronavirus pandemic.  Being that she is also working from home this can also be very isolating and we discussed today the need to incorporate other means of socialization even if that meant in small groups or by Internet to ensure that she does not become even more isolative.  Structure and routine was emphasized and patient acknowledged the importance of having a daily routine established.    Assessment tools used today include: Global assessment (can be found documented in Doc Flowsheets).       Type of psychotherapeutic technique provided: Insight oriented and Solution-focused    Progress toward short term goals: Patient will complete the intake form when she receives that in the mail this week.    Review of long term goals: Not yet established    Diagnosis: Adjustment reaction with anxiety and depressed mood    Plan and Follow-up:  1.  Patient plans to continue taking the medication as prescribed.   2.  Patient plans to follow up with therapy in two weeks.  3.  Patient will complete intake form and mail back to clinic  4.  Complete DA  5.  Try to maintain structure and routine and daily life.    Discharge Criteria/Planning: Patient will continue  with follow-up until therapy can be discontinued without return of signs and symptoms.    I have reviewed the note as documented above.  This accurately captures the substance of my conversation with the patient.As the provider I attest to compliance with applicable laws and regulations related to telemedicine.    Performed and documented by Genet KENNEDY 6/29/20

## 2021-06-10 NOTE — PROGRESS NOTES
Outpatient Mental Health Treatment Plan    Name:  Bhavana Castellanos   :  [birth year]  MRN:  [id number]  Treatment Plan:  Initial Treatment Plan  Intake/initial treatment plan date:  20  Benefit and risks and alternatives have been discussed: Yes  Is this treatment appropriate with minimal intrusion/restrictions: Yes  Estimated duration of treatment:  Approximately 8-10 sessions sessions.  Anticipated frequency of services:  Every 2 weeks  Necessity for frequency: This frequency is needed to establish therapeutic goals and for continuity of care in order to monitor progress.  Necessity for treatment: To address cognitive, behavioral, and/or emotional barriers in order to work toward goals and to improve quality of life.    Session Type: Patient is presenting for an Individual session.    Plan:           ?   ? Anxiety Stabilize Anxiety level while increasing ability to function on a daily basis   Goal:    Strategies: ? [x]Learn and practice relaxation techniques and other coping  strategies (e.g., thought stopping,                                           reframing, meditation)     ? [x] Increase involvement in meaningful activities     ? [x] Discuss sleep hygiene     ? [x] Explore thoughts and expectations about self and others     ? [x] Identify and monitor triggers for panic/anxiety symptoms     ? - Implement physical activity routine (with physician approval)     ? [x] Increase social activities.     ? [x] Continue compliance with medical treatment plan (or explore  barriers)                                       [x]Assertiveness skills building techniques    Degree to which this is a problem: 3  Degree to which goal is met: 2  Date of Review: 20   ?   Depression    Goal:    Alleviate depressed mood and return to previous level of effective functioning..   Strategies:    ?[x] Decrease social isolation     [x] Increase involvement in meaningful activities     ?[x] Discuss sleep hygiene     ?[x] Explore  thoughts and expectations about self and others     ?[x] Process grief (loss of significant person, independence, role,  etc.)     ?[x] Assess for suicide risk     ?[x] Implement physical activity routine (with physician approval)     []      [x] Continue compliance with medical treatment plan (or explore barriers)    Degree to which this is a problem: 3  Degree to which goal is met: 2  Date of Review: 11/24/20   ?  ?    Functional Impairment:  1=Not at all/Rarely  2=Some days  3=Most Days  4=Every Day    Personal : 3  Family : 1  Social : 3   Work/school: 0    Diagnosis:   Adjustment Reaction with Anxiety and Depressed Mood    WHODAS 2.0 12-item version 0.3%      Scores presented in qualifiers to represent level of disability.   NO Problem (none, absent, negligible,...) 0-4%    H1= 1  H2= 1  H3= 0    Clinical assessments and measures completed:. GILLES-7 and PHQ-9, WHOADAS     Strengths:  Intelegent  Limitations:  Isolation due to Pandemic  Cultural Considerations: Pt. Did not indicate any need for cultural considerations.    Persons responsible for this plan: Patient            Psychotherapist Signature         Treatment plan was reviewed with patient.  Obtained verbal consent  Patient Signature:              Guardian Signature             Provider: Genet CRAWFORDSW  Date: 8/24/20

## 2021-06-10 NOTE — PROGRESS NOTES
Evaluator Name:  Genet Chairez LICSW    PATIENT'S NAME: Bhavana Castellanos  PREFERRED NAME: Bhavana  PREFERRED PRONOUNS:       MRN:   [id number]  :   1975   ACCT. NUMBER: [id number]  DATE OF SERVICE: 2020  START TIME: 1600  END TIME: 1652  PREFERRED PHONE: [phone]  May we leave a program related message: Yes    STANDARD ADULT DIAGNOSTIC ASSESSMENT    Telemedicine Visit: The patient's condition can be safely assessed and treated via synchronous audio and visual telemedicine encounter.      Reason for Telemedicine Visit: Patient has requested telehealth visit    Originating Site (Patient Location): Patient's home    Distant Site (Provider Location): Provider Remote Setting- Home Office    Consent:  The patient/guardian has verbally consented to: the potential risks and benefits of telemedicine (video visit) versus in person care; bill my insurance or make self-payment for services provided; and responsibility for payment of non-covered services.     Mode of Communication:  Video Conference via [employer]    As the provider I attest to compliance with applicable laws and regulations related to telemedicine.    Identifying Information:  Patient is a 44 y.o., . female The pronoun use throughout this assessment reflects the patient's chosen pronoun.  Patient was referred for an assessment by self. Patient is being seen today per referral from her primary provider Ana Rosales CNP.      Chief Complaint:   The reason for seeking services at this time is: .Patient's chief complaint is that she has been experiencing depressed mood and increased anxiety and panic attacks beginning 2020 after the start of COVID 19 pandemic.  Patient reports that the panic attacks occur primarily prior to her going to sleep at night resulting in decreased difficulty falling asleep and staying asleep followed by tiredness lack of motivation, fatigue and difficulty focusing and concentrating.       Does the  client have any condition that is currently presenting as a potential to harm themselves or others (severe withdrawal, serious medical condition, severe emotional/behavioral problem)? No.  Proceed with assessment.    Social/Family History:  Patient indicated that she grew up in [place] and [place].  Resided with her parents.  Has 1 brother.  Parents were  when she was in her early 20s.  Both parents remarried.  Patient describes her childhood as mostly normal, but her parents were detached still she indicated she was on her own a lot.  Patient stated that since her parents live to states away she does not see them very often.      Patient's Strengths and Limitations:  Patient identified the following strengths or resources that will help them succeed in treatment: friends / good social support. Things that may interfere with the patient's success in treatment include: Social isolation due to pandemic  _______________________________________________  Personal and Family Medical History:  Patient indicated both parents experienced depression.  Cousin and nephew experienced anxiety and panic attacks.    Substance Use:  No history of substance abuse    Significant Losses / Trauma / Abuse / Neglect Issues:   Patient did not serve in the .  There are indications or report of significant loss, trauma, abuse or neglect issues ; none reported by patient  Concerns for possible neglect are not present. *    Safety Assessment:   Current Safety Concerns:  [place] Suicide Severity Rating Scale (Lifetime/Recent)  [place] Suicide Severity Rating (Lifetime/Recent) 6/15/2020   1. Wish to be Dead (Lifetime) No   1. Wish to be Dead (Past Month) No   2. Non-Specific Active Suicidal Thoughts (Lifetime) No   2. Non-Specific Active Suicidal Thoughts (Past Month) No   3. Active Suicidal Ideation with any Methods (Not Plan) Without Intent to Act (Lifetime) No   3. Active Sucidal Ideation with any Methods (Not Plan)  Without Intent to Act (Past Month) No   4. Active Suicidal Ideation with Some Intent to Act, Without Specific Plan (Lifetime) No   4. Active Suicidal Ideation with Some Intent to Act, Without Specific Plan (Past Month) No   5. Active Suicidal Ideation with Specific Plan and Intent (Lifetime) No   5. Active Suicidal Ideation with Specific Plan and Intent (Past Month) No     Patient denies current homicidal ideation and behaviors.  Patient denies current self-injurious ideation and behaviors.    Patient denied risk behaviors associated with substance use.  Patient denies any high risk behaviors associated with mental health symptoms.  Patient reports the following current concerns for their personal safety: None.  Patient reports there are firearms in the house. None     Risk Plan:  See Preliminary Treatment Plan for Safety and Risk Management Plan    Review of Symptoms per patient report:  Depression: No symptoms, Change in sleep, Lack of interest, Difficulties concentrating, Change in appetite and Feelings of helplessness  Susana:  No Symptoms  Psychosis: No Symptoms  Anxiety: Excessive worry  Panic:  Palpitations and Shortness of breath  Post Traumatic Stress Disorder:  No Symptoms   Eating Disorder: No Symptoms  ADD / ADHD:  No symptoms  Conduct Disorder: No symptoms  Autism Spectrum Disorder: No symptoms  Obsessive Compulsive Disorder: No Symptoms    Patient reports the following compulsive behaviors and treatment history: Gambling - has not had treatment..      Diagnostic Criteria:   Adjustment reaction with anxiety and depressed mood    Functional Status:  Patient reports the following functional impairments: self-care and social interactions.     WHODAS: No flowsheet data found.    Clinical Summary:   1. Reason for assessment: Due to anxiety, panic and thinking.  2. Psychosocial, Cultural and Contextual Factors: Reviewed with patient. Issues related to pandemic and social isolation.  3. As evidenced by self  report and criteria, client meets the following DSM5 Diagnoses:   (Sustained by DSM5 Criteria Listed Above)  adjustment reaction with anxiety and depressed mood  Other Diagnoses that is relevant to services:     .  6. Prognosis: Return to Normal Functioning.  7. Likely consequences of symptoms if not treated: .  8. Client strengths include:  caring, employed and has a previous history of therapy .     Recommendations:     1. Plan for Safety and Risk Management:Recommended that patient call 911 or go to the local ED should there be a change in any of these risk factors..      2. Patient's identified    3. Initial Treatment will focus on: Anxiety and depression     4. Resources/Service Plan:       services are not indicated.     Modifications to assist communication are not indicated.     Additional disability accommodations are not indicated.      5. Collaboration:  Collaboration / coordination of treatment will be initiated with the following support (None indicated)  6.  Referrals:  The following referral(s) will be initiated:   Next Scheduled Appointment: 2 weeks  A Release of Information has been obtained for the followin. Records were reviewed at time of assessment.  Information in this assessment was obtained from the medical record and provided by patient who is a good historian.   Patient will have open access to their mental health medical record..      Eval type:  Mental Health    Staff Name/Credentials:    Genet Chairez [facility]  8/3/2020

## 2021-06-10 NOTE — PROGRESS NOTES
Mental Health Visit Note    Patient: Bhavana Castellanos    : 1975 MRN: [id number]    2020    Start time: 1600    Stop Time: 1652   Session # 5    Session Type: Patient is presenting for an Individual session.    Bhavana Castellanos is a 44 y.o. female is being seen today for  No chief complaint on file.  .   Telemedicine Visit: The patient's condition can be safely assessed and treated via synchronous audio and visual telemedicine encounter.      Reason for Telemedicine Visit: Patient has requested telehealth visit    Originating Site (Patient Location): Patient's home    Distant Site (Provider Location): Provider Remote Setting- Home Office    Consent:  The patient/guardian has verbally consented to: the potential risks and benefits of telemedicine (video visit) versus in person care; bill my insurance or make self-payment for services provided; and responsibility for payment of non-covered services.     Mode of Communication:  Video Conference via [employer]    As the provider I attest to compliance with applicable laws and regulations related to telemedicine.    Those present for this visit Patient and Therapist    Follow-up Patient indicated that she is trying to be more active physically as well as, getting out more with friends.  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  This of course made her very happy and improved her mood.    New symptoms or complaints: Anxiety regarding current work project.  Self esteem issues    Functional Impairment:   Personal: 3  Family: 1  Social: 2  Work: 2    Clinical assessment of mental status:   Bhavana Castellanos presented on time.   She was oriented x3, open and cooperative, and dressed appropriately for this session and weather. Her memory was Normal cognitive functioning .  Her speech was  Within normal.  Language was clear.  Concentration and focus is Within normal. Psychosis is not noted or reported. She reports her mood is  "Congruent w/content of speech.  Affect is congruent with speech and is Congruent w/content of speech.  Fund of knowledge is adequate. Insight is adequate for therapy.    ASSESSMENT: Current Emotional / Mental Status (status of significant symptoms):              Risk status (Self / Other harm or suicidal ideation)              Patient denies protective factors              Patient denies current or recent suicidal ideation or behaviors.              Patient denies current or recent homicidal ideation or behaviors.              Patient denies current or recent self injurious behavior or ideation.              Patient denies other safety concerns.                           Attitude:                                   Cooperative               Orientation:                             Oriented x 3                Speech                          Rate / Production:       Normal/ Responsive Normal                           Volume:                       Normal               Mood:                                      Normal              Thought Content:                    Clear               Thought Form:                        Coherent  Logical               Insight:                                     Good     Patient's impression of their current status: Patient reports that overall she feels she is doing much better and contributes this as result of increasing her physical and social activity.  Patient stated, \"sometimes I forget how important it is to be active.\"    Therapist impression of patients current state: This 44 y.o. White or  female presents with current issues related to self-esteem and self-worth and the fact that being introverted can at times make it difficult to take that chance and meeting others.  She has increased her physical activity as well as, participating weekly in fencing, belly dancing and meeting with friends.  During the session we discussed stop thought processing strategies when she " begins to feel not worthy of being assertive primarily within her work teams and replacing those negative thoughts with positive affirmations.    Assessment tools used today include: global assessment  (can be  found documented in Doc Flowsheets.)     Type of psychotherapeutic technique provided: Insight oriented    Progress toward short term goals: Established today    Review of long term goals: Established today    Diagnosis:   Adjustment reaction with anxiety and depressed mood    Plan and Follow-up:   Patient plans to continue taking the medication as prescribed.   Patient plans to follow up with therapy two weeks.       Discharge Criteria/Planning: Patient will continue with follow-up until therapy can be discontinued without return of signs and symptoms.    I have reviewed the note as documented above.  This accurately captures the substance of my conversation with the patient.  As the provider I attest to compliance with applicable laws and regulations related to telemedicine.  Provider  Genet Chairez [facility]  8/24/20

## 2021-06-11 NOTE — PROGRESS NOTES
"Mental Health Visit Note    Patient: Bhavana Castellanos    : 1975 MRN: [id number]    20   Start time: 1700    Stop Time: 175  Session #  6    Session Type: Patient is presenting for an Individual session.    Bhavana Castellanos is a 44 y.o. White or  female presents with current issues related to self-esteem and self-worth and the fact that being introverted can at times make it difficult to take that chance and meeting others.  She has increased her physical activity as well as, participating weekly in fencing, belly dancing and meeting with friends.      Telemedicine Visit: The patient's condition can be safely assessed and treated via synchronous audio and visual telemedicine encounter.      Reason for Telemedicine Visit: Patient has requested telehealth visit    Originating Site (Patient Location): Patient's home    Distant Site (Provider Location): Provider Remote Setting- Home Office    Consent:  The patient/guardian has verbally consented to: the potential risks and benefits of telemedicine (video visit) versus in person care; bill my insurance or make self-payment for services provided; and responsibility for payment of non-covered services.     Mode of Communication:  Video Conference via [employer]    As the provider I attest to compliance with applicable laws and regulations related to telemedicine.    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  Patient stated, \"when I took on the role I had very little training so sometimes I am not certain if I am doing things correctly or not.    New symptoms or complaints: Self-doubt, lack of assertiveness, difficulty sleeping, difficulty focusing and concentrating, anxiety and sadness.    Functional Impairment:   Personal: 3  Family: 1  Social: 2  Work: 3    Clinical assessment of mental status:   Bhavana Castellanos " "presented on time.   She was oriented x3, open and cooperative, and dressed appropriately for this session and weather. Her memory was Normal cognitive functioning .  Her speech was  Within normal.  Language was clear.  Concentration and focus is Within normal. Psychosis is not noted or reported. She reports her mood is Congruent w/content of speech.  Affect is congruent with speech and is Congruent w/content of speech.  Fund of knowledge is adequate. Insight is adequate for therapy.    ASSESSMENT: Current Emotional / Mental Status (status of significant symptoms):              Risk status (Self / Other harm or suicidal ideation)              Patient denies any personal safety issues              Patient denies current or recent suicidal ideation or behaviors.              Patient denies current or recent homicidal ideation or behaviors.              Patient denies current or recent self injurious behavior or ideation.              Patient denies other safety concerns.              Patient denies any change in risk factors              Patient denies any protective issues                              Attitude:                                   Cooperative               Orientation:                             Oriented x3              Speech                          Rate / Production:       Normal/ Responsive Normal                           Volume:                       Normal               Mood:                                      Normal              Thought Content:                    Clear               Thought Form:                        Coherent  Logical               Insight:                                     Good     Patient's impression of their current status: Patient stated, \"at times I am not sure what their expectations are for how to go about asking.\"    Therapist impression of patients current state: This 44 y.o. White or  female presents with issues related to self-esteem, self-worth, " lack of assertiveness which is negatively impacting her mood. During the session we discussed ways in which she can verbalize to her manager but she is working on weekly as well as ways in which she can keep him informed as to the roadblocks and/or challenges she is facing on particular projects.  She agreed that frequent meetings throughout the week with her manager would be a good way to keep both parties up-to-date.  She indicated this would help decrease her anxiety and improve her mood when she is able to articulate what her needs are and a more assertive expressing those needs. During the session we discussed stop thought processing strategies when she begins to feel not worthy of being assertive primarily within her work teams and replacing those negative thoughts with positive affirmations.    Assessment tools used today include: Global assessment (can be found documented in Doc Flowsheets.)     Type of psychotherapeutic technique provided: Insight oriented and Solution-focused assertiveness skill building.    Progress toward short term goals: Established today    Review of long term goals: Established today. Date of last review 8/24/20 to be reviewed on 11/24/2020.    Diagnosis: Adjustment reaction with anxiety and depressed mood    Plan and Follow-up:   Patient plans to contact her manager expressing her desire to set up weekly meetings.   Patient plans to continue taking the medication as prescribed.   Plans on staying consistent with her daily exercise regime  Patient plans to follow up with therapy in two weeks.     Discharge Criteria/Planning: Patient will continue with follow-up until therapy can be discontinued without return of signs and symptoms.    I have reviewed the note as documented above.  This accurately captures the substance of my conversation with the patient.    As the provider I attest to compliance with applicable laws and regulations related to telemedicine.  Performed and documented  by   Genet Chairez [facility]  9/14/20

## 2021-06-12 NOTE — PROGRESS NOTES
"Mental Health Visit Note    Patient: Bhavana Castellanos    : 1975 MRN: [id number]    10/5/20    Start time: 1700   Stop Time: 175   Session # 7    Session Type: Patient is presenting for an Individual session.    Bhavana Castellanos is a 45 y.o. female was being seen for a follow-up visit.  Patient indicated current issues related to self-esteem and self-worth and the fact that being introverted can at times make it difficult to take that chance and meeting others.  She has increased her physical activity as well as, participating weekly in fencing, belly dancing and meeting with friends.       Telemedicine Visit: The patient's condition can be safely assessed and treated via synchronous audio and visual telemedicine encounter.      Reason for Telemedicine Visit: Patient has requested telehealth visit    Originating Site (Patient Location): Patient's home    Distant Site (Provider Location): Provider Remote Setting- Home Office    Consent:  The patient/guardian has verbally consented to: the potential risks and benefits of telemedicine (video visit) versus in person care; bill my insurance or make self-payment for services provided; and responsibility for payment of non-covered services.     Mode of Communication:  Video Conference via [employer]    As the provider I attest to compliance with applicable laws and regulations related to telemedicine.    Those present for this visit Patient and Therapist    Follow up Patient indicated that she has been experiencing abdominal pain and has had ultrasounds and now is being referred to a gastro doctor.  She continues to work from home and stated \"that is going okay.\"     New symptoms or complaints: Physically not feeling well.    Functional Impairment:   Personal: 3  Family: 0  Social: 2  Work: 1    Clinical assessment of mental status:   Bhavana Castellanos presented on time.   She was oriented x3, open and cooperative, and dressed appropriately for this session and " "weather. Her memory was Normal cognitive functioning .  Her speech was  Within normal.  Language was clear.  Concentration and focus is Within normal. Psychosis is not noted or reported. She reports her mood is Congruent w/content of speech.  Affect is congruent with speech and is Congruent w/content of speech.  Fund of knowledge is adequate. Insight is adequate for therapy.    ASSESSMENT: Current Emotional / Mental Status (status of significant symptoms):              Risk status (Self / Other harm or suicidal ideation)              Patient denies any safety issues              Patient denies current or recent suicidal ideation or behaviors.              Patient denies current or recent homicidal ideation or behaviors.              Patient denies current or recent self injurious behavior or ideation.              Patient denies other safety concerns.              Patient denies any risk factors              Patient denies any protective issues                              Attitude:                                   Cooperative               Orientation:                             Oriented x3              Speech                                    Clear                          Rate / Production:       Normal/ Responsive Normal                           Volume:                       Normal               Mood:                                      Normal              Thought Content:                    Clear               Thought Form:                        Coherent  Logical               Insight:                                     Good     Patient's impression of their current status: Patient stated, I wish I was feeling a little bit better is difficult having abdominal pain and not knowing what is going on.\"    Therapist impression of patients current state: This 45 y.o. White or  female presents with issues related to social isolation, depressed mood and anxiety.  Patient stated she has recently been " experiencing abdominal pain and has needed to have testing done in order to find the source of her abdominal pain.  She is hopeful that once this is remedied she can continue back exercising and beginning to increase her socialization. She stated, that when she can engage in social activities her mood improves and she is also able to sleep better.    Assessment tools used today include: Global assessment (can be found documented in Doc Flowsheets).     Type of psychotherapeutic technique provided: Insight oriented and Solution-focused    Progress toward short term goals: Not reviewed today    Review of long term goals: Not reviewed today.  Treatment plan due 12/24/2020    Diagnosis: Adjustment reaction with anxiety and depressed mood    Plan and Follow-up:  1.  Patient plans to continue exercise regime once she feels medically better..  2.  We will continue to be in touch with family and friends on a regular basis.  3.  Patient plans to continue taking the medication as prescribed.  4.  Patient plans to follow up with therapy in two weeks.     Discharge Criteria/Planning: Patient will continue with follow-up until therapy can be discontinued without return of signs and symptoms.    I have reviewed the note as documented above.  This accurately captures the substance of my conversation with the patient.    As the provider I attest to compliance with applicable laws and regulations related to telemedicine.  Performed and documented by   Genet Chairez [facility]  10/5/20

## 2021-06-12 NOTE — PROGRESS NOTES
Mental Health Visit Note    Patient: Bhavana Castellanos    : 1975 MRN: [id number]    10/20/2020    Start time 1500    Stop Time: 1552   Session # 8    Session Type: Patient is presenting for an Individual session.    Bhavana Castellanos is a 45 y.o. female is being seen today for follow-up .     Telemedicine Visit: The patient's condition can be safely assessed and treated via synchronous audio and visual telemedicine encounter.      Reason for Telemedicine Visit: Patient has requested telehealth visit    Originating Site (Patient Location): Patient's home    Distant Site (Provider Location): Provider Remote Setting- Home Office    Consent:  The patient/guardian has verbally consented to: the potential risks and benefits of telemedicine (video visit) versus in person care; bill my insurance or make self-payment for services provided; and responsibility for payment of non-covered services.     Mode of Communication:  Video Conference via [employer] due to connection issues with [employer].    As the provider I attest to compliance with applicable laws and regulations related to telemedicine.    Those present for this visit Patient and Therapist    Follow up Patient stated, that she has been experiencing some stomach issues and is scheduled for an endoscopy on 10/21/2020.  Otherwise, she is trying to get more sleep and is feeling more confident regarding completing her work assignments on time.      New symptoms or complaints: Anxiety related to stomach issues and social isolation as a result of the pandemic.    Functional Impairment:   Personal: 3  Family: 1  Social: 2  Work: 3    Clinical assessment of mental status:   Bhavana Castellanos presented on time.   She was oriented x3, open and cooperative, and dressed appropriately for this session and weather. Her memory was Normal cognitive functioning .  Her speech was  Within normal.  Language was clear  Concentration and focus is Within normal. Psychosis is not noted or  "reported. She reports her mood is Congruent w/content of speech.  Affect is congruent with speech and is Congruent w/content of speech.  Fund of knowledge is adequate. Insight is adequate for therapy.    ASSESSMENT: Current Emotional / Mental Status (status of significant symptoms):              Risk status (Self / Other harm or suicidal ideation)              Patient denies any protective issues              Patient denies current or recent suicidal ideation or behaviors.              Patient denies current or recent homicidal ideation or behaviors.              Patient denies current or recent self injurious behavior or ideation.              Patient denies other safety concerns.              Patient denies any safety issues.                          Attitude:                                   Cooperative               Orientation:                              Oriented x3              Speech                                     Clear                          Rate / Production:        Normal/ Responsive Normal                           Volume:                        Normal               Mood:                                       Normal              Thought Content:                     Clear               Thought Form:                         Coherent  Logical               Insight:                                      Good     Patient's impression of their current status: Patient stated \" I am doing okay I am just helping I am feeling better soon.\"    Therapist impression of patients current state: This 45 y.o. White or  female who was seen for follow-up visit.  Patient reports that she is sleeping better trying to have more work life balance although recently has been experiencing some medical issues that have prohibited her from the types of physical activities she had been participating in.  During today's session we discussed the step thought processing techniques and strategies used when negative " thoughts occur.  Patient is feeling more confident and comfortable using these techniques on a regular basis.    Assessment tools used today include:Global ssessment and review of the GILLES-7 and PHQ-9     Type of psychotherapeutic technique provided: Insight oriented and Solution-focused    Progress toward short term goals: Not reviewed today    Review of long term goals: Not reviewed today. To be updated on 11/24/2020    Diagnosis: Adjustment reaction with anxiety and depressed mood    Plan and Follow-up:  1.  Patient plans to continue exercise regime once she feels medically better.  2.  We will continue to be in touch with family and friends on a regular basis.  3.  Patient plans to continue taking the medication as prescribed.  4.  Patient plans to follow up with therapy in 3 weeks.     Discharge Criteria/Planning: Patient will continue with follow-up until therapy can be discontinued without return of signs and symptoms.    I have reviewed the note as documented above.  This accurately captures the substance of my conversation with the patient.    As the provider I attest to compliance with applicable laws and regulations related to telemedicine.  Performed and documented by   Genet Chairez [facility]  10/20/20

## 2021-06-13 NOTE — PROGRESS NOTES
Mental Health Visit Note    Patient: Bhavana Castellanos    : 1975 MRN: [id number]    12/10/2020    Start time: 1400   Stop Time:  1452  Session # 10    Session Type: Patient is presenting for an Individual session.    Bhavana Castellanos is a 45 y.o. female for a follow-up visit for mood instability and psychosocial stressors.     Telemedicine Visit: The patient's condition can be safely assessed and treated via synchronous audio and visual telemedicine encounter.      Reason for Telemedicine Visit: Patient has requested telehealth visit    Originating Site (Patient Location): Patient's home    Distant Site (Provider Location): Provider Remote Setting- Home Office    Consent:  The patient/guardian has verbally consented to: the potential risks and benefits of telemedicine (video visit) versus in person care; bill my insurance or make self-payment for services provided; and responsibility for payment of non-covered services.     Mode of Communication:  Video Conference via [employer]    As the provider I attest to compliance with applicable laws and regulations related to telemedicine.    Those present for this visit patient and therapist    Follow up Patient reports that overall she has been more productive in regard to work and is getting better sleep.  She continues current medication regime and is having positive effects.    New symptoms or complaints: Some concerns related to fears that she knows are not substantiated.    Functional Impairment:   Personal: 3  Family: 2  Work: 2  Social:3          ASSESSMENT: Current Emotional / Mental Status (status of significant symptoms):              Risk status (Self / Other harm or suicidal ideation)              Patient denies any personal safety issues              Patient denies current or recent suicidal ideation or behaviors.              Patient denies current or recent homicidal ideation or behaviors.              Patient denies current or recent self injurious  "behavior or ideation.              Patient denies other safety concerns.              Patient denies any risk factors              Patient indicated that her family is supportive and involved                              Attitude:                                   Cooperative               Orientation:                               Oriented x3              Speech                                       clear                          Rate / Production:       Normal/ Responsive Normal                           Volume:                       Normal               Mood:                                      Normal              Thought Content:                    Clear               Thought Form:                        Coherent  Logical               Insight:                                     Good       Patient's impression of their current status: Patient stated, \"I think I am doing okay work is going better and I am just looking forward to better next year.\"    Therapist impression of patients current state: During the session we updated the treatment plan and discussed goals for the upcoming future.  Patient would like to work on areas of concern related to obsessive thinking something terrible will happen.  She was able to describe those types of thoughts and began attempting to use stop thought processing techniques to eliminate those negative thoughts.    Type of psychotherapeutic technique provided: Insight oriented and Solution-focused    Progress toward short term goals: Updated short-term goals    Review of long term goals: Treatment plan updated today    Diagnosis:  Adjustment disorder with anxiety and depressed mood    Plan and Follow up:  1.  Patient plans to continue exercise regime once she feels medically better.  2.  We will continue to be in touch with family and friends on a regular basis.  3.  Patient plans to continue taking the medication as prescribed.  4.  Patient plans to follow up with therapy " in 4 weeks.   5.  Begin getting to know her neighbors    Discharge Criteria/Planning: Patient will continue with follow-up until therapy can be discontinued without return of signs and symptoms.     I have reviewed the note as documented above.  This accurately captures the substance of my conversation with the patient.  As the provider I attest to compliance with applicable laws and regulations related to telemedicine.  Performed and documented by    Genet KENNEDY  12/10/20

## 2021-06-13 NOTE — PROGRESS NOTES
Outpatient Mental Health Treatment Plan      Name:  Bhavana Castellanos   :  [birth year]  MRN:  [id number]  Treatment Plan:  Initial Treatment Plan updated on 12/10/20  Intake/initial treatment plan date:  20  Benefit and risks and alternatives have been discussed: Yes  Is this treatment appropriate with minimal intrusion/restrictions: Yes  Estimated duration of treatment:  Approximately 8-10 sessions sessions.  Anticipated frequency of services:  Every 2 weeks  Necessity for frequency: This frequency is needed to establish therapeutic goals and for continuity of care in order to monitor progress.  Necessity for treatment: To address cognitive, behavioral, and/or emotional barriers in order to work toward goals and to improve quality of life.     Session Type: Patient is presenting for an Individual session.     Plan:                                                                              ?              ? Anxiety          Stabilize Anxiety level while increasing ability to function on a daily basis     Goal:               Strategies:       ? [x]?Learn and practice relaxation techniques and other coping  strategies (e.g., thought stopping,                                           reframing, meditation)                                      ? [x]? Increase involvement in meaningful activities                                      ? [x]? Discuss sleep hygiene                                      ? [x]? Explore thoughts and expectations about self and others                                      ? [x]? Identify and monitor triggers for panic/anxiety symptoms                                      ? - Implement physical activity routine (with physician approval)                                      ? [x]? Increase social activities.                                      ? [x]? Continue compliance with medical treatment plan (or explore  barriers)                                                  [x]?Assertiveness skills  building techniques     Degree to which this is a problem: 2  Degree to which goal is met: 1  Date of Review: 3/10/20         ?   Depression                  Goal:      Alleviate depressed mood and return to previous level of effective functioning..              Strategies:       ?[x]? Decrease social isolation                                      [x]? Increase involvement in meaningful activities                                      ?[x]? Discuss sleep hygiene                                      ?[x]? Explore thoughts and expectations about self and others                                      ?[x]? Process grief (loss of significant person, independence, role,  etc.)                                      ?[x]? Assess for suicide risk                                      ?[x]? Implement physical activity routine (with physician approval)                                      []?                                       [x]? Continue compliance with medical treatment plan (or explore barriers)     Degree to which this is a problem: 2  Degree to which goal is met: 1  Date of Review: 3/10/20                               ?  ?     Functional Impairment:  1=Not at all/Rarely  2=Some days  3=Most Days  4=Every Day    Personal : 3  Family : 1  Social : 3   Work/school: 0     Diagnosis:   Adjustment Reaction with Anxiety and Depressed Mood     WHODAS 2.0 12-item version 0.3%       Scores presented in qualifiers to represent level of disability.   NO Problem (none, absent, negligible,...) 0-4%     H1= 1  H2= 1  H3= 0     Clinical assessments and measures completed:. GILLES-7 and PHQ-9, WHOADAS     Strengths:  Intelegent  Limitations:  Isolation due to Pandemic  Cultural Considerations: Pt. Did not indicate any need for cultural considerations.     Persons responsible for this plan: Patient                                                                                         Treatment plan was reviewed with patient.  Patient  Signature: Obtained verbal consent    Genet Chairez [facility]  12/1-0/20

## 2021-06-13 NOTE — PROGRESS NOTES
Mental Health Visit Note    Patient: Bhavana Castellanos    : 1975 MRN: [id number]    2020    Start time: 1600   Stop Time: 1652   Session # 9    Session Type: Patient is presenting for an Individual session.    Bhavana Castellanos is a 45 y.o. female is being seen today for follow-up visit for mood instability and psychosocial stressors..     Telemedicine Visit: The patient's condition can be safely assessed and treated via synchronous audio and visual telemedicine encounter.      Reason for Telemedicine Visit: Patient has requested telehealth visit    Originating Site (Patient Location): Patient's home    Distant Site (Provider Location): Provider Remote Setting- Home Office    Consent:  The patient/guardian has verbally consented to: the potential risks and benefits of telemedicine (video visit) versus in person care; bill my insurance or make self-payment for services provided; and responsibility for payment of non-covered services.     Mode of Communication:  Video Conference via [employer]    As the provider I attest to compliance with applicable laws and regulations related to telemedicine.    Those present for this visit patient and therapist    Follow up Patient indicated that she is feeling much more confident about her work and is attempting to reach out to others for socialization.    New symptoms or complaints: None indicated today    Functional Impairment:   Personal: 2  Family: 1  Work: 2  Social: 2    Patient's impression of their current status: Patient stated that she is feeling much more confident and sure of herself as well as communicating better with her fellow employees and boss in particular.    Therapist impression of patients current state: During the session we discussed continued assertiveness techniques and strategies in order to gain self-confidence and improve mood.  Although social distancing protocol continues to be in place patient is finding ways of communicating with others  via online and Zoom.  Patient is gaining additional insight into her strengths and finding ways to utilize them and productive ways.    Type of psychotherapeutic technique provided: Insight oriented and Solution-focused    Progress toward short term goals: Continues to progress will be updated at next session    Review of long term goals: Not done at today's visit  Treatment plan to be updated at next veronique.      Diagnosis:  Adjustment disorder with anxiety and depressed mood    Plan and Follow up:   1.  Patient plans to continue exercise regime once she feels medically better.  2.  We will continue to be in touch with family and friends on a regular basis.  3.  Patient plans to continue taking the medication as prescribed.  4.  Patient plans to follow up with therapy in 3 weeks.      Discharge Criteria/Planning: Patient will continue with follow-up until therapy can be discontinued without return of signs and symptoms.     I have reviewed the note as documented above.  This accurately captures the substance of my conversation with the patient.  As the provider I attest to compliance with applicable laws and regulations related to telemedicine.  Performed and documented by   Genet Chairez [facility]  11/17/20

## 2021-06-14 NOTE — PROGRESS NOTES
1/25/2021       Psychotherapy progress note     Start time: 1700    Stop Time: 1752   Session # 11    Session Type: Patient is presenting for an Individual session.    Bhavana Castellanos is a 45 y.o. female is being seen today for a follow-up visit for issues related to anxiety depression and psychosocial stressors.    Follow up: Patient was last seen on 12/10/2020. Patient indicated that she found out that the lining of her stomach was bleeding and is now on a special diet to help remedy that problem.    New symptoms or complaints: Tired due to working overtime, medical concerns.    Functional Impairment:   Personal: 3  Family: 1  Social: 3  Work: 3    Clinical assessment of mental status:   Bhavana Castellanos presented on time.   She was oriented x3, open and cooperative, and dressed appropriately for this session and weather. Her memory was Normal cognitive functioning .  Her speech was  Within normal.  Language was clear Concentration and focus is Within normal. Psychosis is not noted or reported. She reports her mood is Congruent w/content of speech.  Affect is congruent with speech and is Congruent w/content of speech.  Fund of knowledge is adequate. Insight is adequate for therapy.    Suicidal/Homicidal Ideation present: Cazenovia administered; Cazen[place]ia completed and date 1/25/21.     Patient's impression of their current status: Today's session we reviewed the PHQ-9 GILLES-7 and C-SSRS    Therapist impression of patients current state: This 45 y.o. White or  female who presents for follow-up appointment today.  During the session we discussed ways in which patient can continue gaining some level of socialization during the pandemic.  She is hoping to be able to visit her family within the next few months and is looking forward to once again participate in dance class in the near future.  Self-care strategies were reviewed and updated on the treatment plan. Updated the PHQ-9, GILLES-7 and C-SSRS.  It appears that  both anxiety and depression scores are improving.    Assessment tools used today include:    GILLES-7 5 indicating mild anxiety PHQ-9 scoring 2 indicating no depression and the C-SSRS does not indicate any suicidal thoughts or intentions.  Can be found documented in Doc Flowsheets.     Type of psychotherapeutic technique provided: Insight oriented and Solution-focused    Review of medication: Adherence as MD prescribed; Side effects noted; Efficacy    Review of patient health/health concerns: new concerns; treatment sought/referral needed    Progress toward short term goals: Updated on 12/10/20    Review of long term goals: Updated on 12/10/20    Diagnosis: Adjustment disorder with anxiety    Plan and follow-up  1.  Patient plans to continue exercise regime once she feels medically better.  2.  We will continue to be in touch with family and friends on a regular basis.  3.  Patient plans to continue taking the medication as prescribed.  4.  Patient plans to follow up with therapy in 4 weeks.   5.  Begin getting to know her neighbors     Discharge Criteria/Planning: Patient will continue with follow-up until therapy can be discontinued without return of signs and symptoms.    Performed and documented by 1/25/2021   Genet KENNEDY

## 2021-06-15 NOTE — PROGRESS NOTES
Mental Health Visit Note    Patient: Bhavana Castellanos    : 1975 MRN: [id number]    2021    Start time: 1700    Stop Time: 175   Session # 12    Session Type: Patient is presenting for an Individual session.    Bhavana Castellanos is a 45 y.o. female is being seen today for a follow-up visit for issues related to anxiety depression and psychosocial stressors..     Telemedicine Visit: The patient's condition can be safely assessed and treated via synchronous audio and visual telemedicine encounter.      Reason for Telemedicine Visit: Patient has requested telehealth visit    Originating Site (Patient Location): Patient's home    Distant Site (Provider Location): Provider Remote Setting- Home Office    Consent:  The patient/guardian has verbally consented to: the potential risks and benefits of telemedicine (video visit) versus in person care; bill my insurance or make self-payment for services provided; and responsibility for payment of non-covered services.     Mode of Communication:  Video Conference via [employer]    As the provider I attest to compliance with applicable laws and regulations related to telemedicine.    Those present for this visit patient and therapist    Follow up Patient stated that she has had continual issues primarily sounds working remotely with follow-through and tendency to procrastinate then resulting in feeling overwhelmed and depressed.    New symptoms or complaints: Feeling overwhelmed, depressed, frustrated, feeling guilty, feeling as though she is not working up to her potential.    Functional Impairment:   Personal: 4  Family: 1  Work: 3  Social:4     ASSESSMENT: Current Emotional / Mental Status (status of significant symptoms):              Risk status (Self / Other harm or suicidal ideation)              Patient denies any personal safety issues              Patient denies current or recent suicidal ideation or behaviors.              Patient denies current or recent  "homicidal ideation or behaviors.              Patient denies current or recent self injurious behavior or ideation.              Patient denies other safety concerns.              Patient denies any risk issues              Patient indicated that family is supportive and involved although reside out of town              Recommended that patient call 911 or go to the local ED should there be a change in any of these risk factors.                Attitude:                                   Cooperative               Orientation:                             Oriented x3              Speech                                    clear                          Rate / Production:       Normal/ Responsive Normal                           Volume:                       Normal               Mood:                                      Somewhat sullen            Thought Content:                    Clear               Thought Form:                        Coherent  Logical               Insight:                                     Good       Patient's impression of their current status: Patient stated, my frustration has been increasing and I do not feel as though I am really getting a handle on things.\"    Therapist impression of patients current state: During today's session we reviewed patient's goals and aspirations and struggles.  Patient stated that as far back as she can remember procrastinating and waiting to the end was a common theme although she considers herself fairly bright and was able to get things done at the end.  Current stressors include concerns that have been brought to her attention from her boss indicating that she has much more potential than what she exhibiting in her work.  It appears that it may warrant further evaluation with respect to attention issues.    Type of psychotherapeutic technique provided: Insight oriented and Solution-focused    Progress toward short term goals: Not reviewed today    Review of " long term goals: Not done at today's visit  Treatment plan updated on 12/10/20     Diagnosis:  Adjustment disorder with anxiety    Plan and Follow up:   1.  Patient to complete the ADHD intake  2.  Patient plans to continue exercise regime once she feels medically better.  3.  We will continue to be in touch with family and friends on a regular basis.4  4.  Patient plans to continue taking the medication as prescribed.  5.  Patient plans to follow up with therapy in 4 weeks.   6.  Begin getting to know her neighbors      Discharge Criteria/Planning: Patient will continue with follow-up until therapy can be discontinued without return of signs and symptoms.          I have reviewed the note as documented above.  This accurately captures the substance of my conversation with the patient.  As the provider I attest to compliance with applicable laws and regulations related to telemedicine.  Performed and documented by   Genet KENNEDY  2/22/21

## 2021-06-16 PROBLEM — K76.0 FATTY LIVER: Status: ACTIVE | Noted: 2021-06-16

## 2021-06-16 ASSESSMENT — ENCOUNTER SYMPTOMS
JOINT SWELLING: 0
MYALGIAS: 0
NAUSEA: 0
ARTHRALGIAS: 0
SHORTNESS OF BREATH: 0
BREAST MASS: 0
NERVOUS/ANXIOUS: 0
SORE THROAT: 0
FREQUENCY: 0
DIARRHEA: 0
PALPITATIONS: 0
COUGH: 0
CHILLS: 0
FEVER: 0
EYE PAIN: 0
CONSTIPATION: 0
HEARTBURN: 0
ABDOMINAL PAIN: 0
DIZZINESS: 0
PARESTHESIAS: 0
HEMATURIA: 0
HEADACHES: 0
WEAKNESS: 0
HEMATOCHEZIA: 0
DYSURIA: 0

## 2021-06-16 NOTE — PROGRESS NOTES
"Mental Health Visit Note    Patient: Bhavana Castellanos    : 1975 MRN: [id number]    3/22/2021    Start time: 1700    Stop Time: 1752     Session #13    Session Type: Patient is presenting for an Individual session.    Presenting issue  Bhavana Castellanos is a 45 y.o. female is being seen for follow-up visit.  Patient indicated that she has been experiencing anxiety depression, issues at work particularly since the onset of the pandemic.  Based on patient's continued verbalization of problematic symptoms this warranted further evaluation for ADHD.  Patient completed the adult ADHD self-report scale and Adult Intake Form. (Refer below.)    Telemedicine Visit: The patient's condition can be safely assessed and treated via synchronous audio and visual telemedicine encounter.      Reason for Telemedicine Visit: Patient has requested telehealth visit    Originating Site (Patient Location): Patient's home    Distant Site (Provider Location): Provider Remote Setting- Home Office    Consent:  The patient/guardian has verbally consented to: the potential risks and benefits of telemedicine (video visit) versus in person care; bill my insurance or make self-payment for services provided; and responsibility for payment of non-covered services.     Mode of Communication:  Video Conference via [employer]    As the provider I attest to compliance with applicable laws and regulations related to telemedicine.    Those present for this visit patient and therapist    Follow up patient stated, that she did complete the ADHD assessment and started reading the book woman with ADHD by Gisela Armstrong.  Patient stated. \"I identified with those symptoms identified in the book particularly with those who continually spent a considerable amount of time organizing on weekends but it never seemed to get anything done.\" Patient had also asked her mother if during her growing up years, if she saw any ADHD symptoms present and that her mother " "indicated that she did not believe that she had ADHD because she was \"too smart.\"    Adult ADHD self-report scale (ASRS.v1.1)   symptom checklist    Very often  Trouble wrapping up final details of projects once the challenging parts have been done, avoid or delay getting started, fidgets and squirms when sitting for long period of time, makes careless mistakes on projects that are boring or difficult, difficulty keeping attention when doing boring or repetitive work.    Often  Getting things in order when a task requires organization, difficulty concentrating on what people say even when they were speaking to you directly, misplaces items or have difficulty finding things at home or at work, often distracted by activity or noise around you.    Sometimes  Problems with remembering appointments or obligations, feel restless and fidgety, difficulty under winding and relaxing, frequently talking too much when in social situations, often finishing other sentences before they can finish them, difficulty waiting turn in situations, problems with interrupting others when they are busy.    Rarely feels overactive or compelled to do things like driven by a motor, rarely often leaves seat in meetings or other situations.    Never  No symptoms indicated in this category.    New symptoms or complaints: Difficulty focusing, anxiety, frequently procrastinating, difficulty making deadlines, anxiety, depressed mood.    ----------------------------------------------------------------------------  Functional Impairment:   Personal: 4  Family: 2  Work: 3  Social:3    ASSESSMENT: Current Emotional / Mental Status (status of significant symptoms):              Risk status (Self / Other harm or suicidal ideation)              Patient denies any personal safety issues              Patient denies current or recent suicidal ideation or behaviors.              Patient denies current or recent homicidal ideation or behaviors.           " Patient does not partake in any risk behaviors.           Patient indicated that she feels safe          Patient indicated that family is supportive and involved              Recommended that patient call 911 or go to the local ED should there be a change in any of these risk factors.                Attitude:                                   Cooperative               Orientation:                             Oriented x3              Speech                                    Clear              Rate / Production:       Normal/ Responsive Normal                           Volume:                       Normal               Mood:                                      Normal              Thought Content:                    Clear               Thought Form:                        Coherent  Logical               Insight:                                     Good     Patient's impression of their current status: Patient indicated that she has been struggling for a long period of time and every aspect of her life many of the common ADHD symptoms that have not previously been addressed which may in fact be contributing to her longstanding history of anxiety and depression.  During today's session the ADHD symptomology was reviewed with patient please see below.    Therapist impression of patients current state:   During today's session the ADHD symptomology was reviewed with patient please see below.    Family history   Patient is not aware of any family members who have been diagnosed with ADHD.  Educational history  Highest level of education was graduate school although did not finish because of not completing her thesis.  Academic problems  Math, Indonesian  Classes excelled in  History, art and creative writing.  Tutoring/special education  In elementary school had speech therapy  Behavioral/discipline problems  Patient did not indicate she had any behavioral issues  Disorganization  Elementary, middle, high school and  postsecondary.  Patient states she spent a lot of time organizing cleaning desk/locker and that it was always a mess.  Social/peer issues  Trouble making friends, often had conflict with others, often teased and bullied, was shy and withdrawn.  Class attendance problems  No although, fake being sick to get out of school because of feeling bullied.  Procrastination/poor time management  Elementary, middle, high school and postsecondary issues with trouble completing and turning homework in and would frequently complete tasks at last minute.  Childhood behaviors  Trouble completing assignments, struggled to keep room organized, misplaced lost or had problems keeping track of items, forgot school work between home and school, needed frequent reminders by parents or teachers to complete work, did not argue although just quietly ignored rules.   Relationships/social current and past  Patient indicated she was not good at dating.  Would frequently forget birthdays and holidays of others fail to respond to emails calls etc. would consider herself on but unreliable.  Would often be oblivious of others's feelings.   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 Current employer has difficulty understanding why she has struggles. Has had a history of being fired in the past.    Current problems at work include: Frequent mistakes, often bored, late finishing projects, poor time management, easily distracted, problems with organization.  Sleep issues  Patient states that she frequently has insomnia, history of teeth grinding, daytime sleepiness, excessive dreaming, bedwetting as a young child, sleeps approximately 5 to 8 hours a night.  Diet  Eats 3 meals a day, craves sweets, often eats fast food, drinks coffee daily.  Exercise  Indicates 1-2 times per week used to partake in fencing and dance prior to the  pandemic.  Driving history  Received 's license at age 17-1/2 has current 's license.  Received warning for not turning on headlights and pulling out and going the wrong way.  Usually speeds.  Other risky/impulsive behaviors  Impulsive decision making excessive spending at times.  Patient has never been tested for any attention issues    Type of psychotherapeutic technique provided: Insight oriented and Solution-focused    Progress toward short term goals: Not reviewed today    Review of long term goals: Not done at today's visit  Treatment plan updated on 11/24/21     Diagnosis:  Adjustment disorder with anxiety    Provisional diagnosis  1.  ADHD (inattentive)    Plan and Follow up:   1.  Patient to consider further evaluation by Psychology  for confirmation of Provisional diagnosis of         ADHD (inattentive)  2.  Review findings with patient at next session  3.  Next session 3 weeks  4.  Patient to complete ADHD book by Gisela Armstrong  5.  Update PHQ-9, GILLES-7, diagnosis and treatment  Plan.    Discharge Criteria/Planning: Patient will continue with follow-up until therapy can be discontinued without return of signs and symptoms.    I have reviewed the note as documented above.  This accurately captures the substance of my conversation with the patient.    As the provider I attest to compliance with applicable laws and regulations related to telemedicine.  Performed and documented by  Genet Chairez [facility]  3/22/21

## 2021-06-16 NOTE — PROGRESS NOTES
"Mental Health Visit Note    Patient: Bhavana Castellanos    : 1975 MRN: [id number]    2021    Start time: 1700    Stop Time: 175   Session # 14    Session Type: Patient is presenting for an Individual session.     Bhavana Castellanos is a 45 y.o. female is being seen for follow-up visit.  Patient indicated that she has been experiencing anxiety depression, issues at work particularly since the onset of the pandemic.Based on patient's continued verbalization of problematic symptoms this warranted further evaluation for ADHD.     Telemedicine Visit: The patient's condition can be safely assessed and treated via synchronous audio and visual telemedicine encounter.      Reason for Telemedicine Visit: Patient has requested telehealth visit    Originating Site (Patient Location): Patient's home    Distant Site (Provider Location): Provider Remote Setting- Home Office    Consent:  The patient/guardian has verbally consented to: the potential risks and benefits of telemedicine (video visit) versus in person care; bill my insurance or make self-payment for services provided; and responsibility for payment of non-covered services.     Mode of Communication:  Video Conference via [employer]    As the provider I attest to compliance with applicable laws and regulations related to telemedicine.    Those present for this visit Therapist and Patient    Follow up Patient stated, that she is feeling tired and overwhelmed due to the end of the year work responsibilities she is needing to complete within a specific timeframe.  Patient stated, \"so hard to stay focused.\"      New symptoms or complaints:   Difficulty focusing, anxiety, frequently procrastinating, difficulty making deadlines, anxiety, depressed mood    Functional Impairment:   Personal: 4  Family: 2  Work: 3  Social:3  ASSESSMENT: Current Emotional / Mental Status (status of significant symptoms):              Risk status (Self / Other harm or suicidal " "ideation)              Patient denies any personal safety issues              Patient denies current or recent suicidal ideation or behaviors.              Patient denies current or recent homicidal ideation or behaviors.           Patient does not partake in any risk behaviors.           Patient indicated that she feels safe          Patient indicated that family is supportive and involved              Recommended that patient call 911 or go to the local ED should there be a change in any of these risk factors.                 Attitude:                                   Cooperative               Orientation:                             Oriented x3              Speech                                    Clear              Rate / Production:       Normal/ Responsive Normal                           Volume:                       Normal               Mood:                                      Normal              Thought Content:                    Clear               Thought Form:                        Coherent  Logical               Insight:                                     Good           Patient's impression of their current status:\" I am just feeling overwhelmed and so tired right now.\"    Therapist impression of patients current state: During today's session we reviewed the findings from the ADHD intake information patient completed.  Based on patient's responses it appears worthy of further evaluation for clarification and/or comorbidities of diagnoses.  Patient agreeable to this plan.    Type of psychotherapeutic technique provided: Insight oriented and Solution-focused    Progress toward short term goals: Not reviewed today    Review of long term goals: Not done at today's visit  Treatment plan updated on 12/10/20     Diagnosis:  Adjustment disorder with anxiety and depressed mood    Provisional diagnosis  ADHD (inattentive)    Plan and Follow up:   1.   To help arrange for further evaluation/testing by " Psychology and diagnostic clarification of      provisional diagnosis/comorbidities and identify appropriate supportive and intervention strategies   based on the information provided by patient from the ADHD intake questionnaire.  2.   Patient will attempt to include exercise on a regular basis  3.   Continue to incorporate self-care modalities  4.   Update PHQ-9 and GILLES-7 as well as treatment plan  5.   Next appointment in 3 weeks    Discharge Criteria/Planning: Patient will continue with follow-up until therapy can be discontinued without return of signs and symptoms.     I have reviewed the note as documented above.  This accurately captures the substance of my conversation with the patient.  As the provider I attest to compliance with applicable laws and regulations related to telemedicine.  Performed and documented by   Genet Chairez [facility]  4/5/21

## 2021-06-17 DIAGNOSIS — Z12.31 VISIT FOR SCREENING MAMMOGRAM: ICD-10-CM

## 2021-06-17 PROCEDURE — 77067 SCR MAMMO BI INCL CAD: CPT | Mod: TC | Performed by: RADIOLOGY

## 2021-06-17 NOTE — PROGRESS NOTES
Here today to est care and for physical.  previously followed by Ana Rosales who is retiring.      Acute concerns:  ***    Chronic problems:  Hypothyroidism- she is on synthroid 112mcg daily.  Normal TSH 5/2020.    MMD- follows with therapist    Has seen [facility] 1/2021 for RUQ pain, US revealed fatty liver.  Endoscopy showed gastritis.  Treated with course of PPI.  Plan for follow up as needed.  Normal LFTs 4/2021    Health Screening:  Mammo:6/17/21      To Do:  pap

## 2021-06-17 NOTE — PROGRESS NOTES
Mental Health Visit Note    Patient: Bhavana Castellanos    : 1975 MRN: [id number]    2021    Start time: 1600   Stop Time:  1642 Session # 15    Session Type: Patient is presenting for an Individual session.      Bhavana Castellanos is a 45 y.o. female is being seen for follow-up visit.  Patient indicated that she has been experiencing anxiety depression, issues at work particularly since the onset of the pandemic.Based on patient's continued verbalization of problematic symptoms this warranted further evaluation for ADHD.     Telemedicine Visit: The patient's condition can be safely assessed and treated via synchronous audio and visual telemedicine encounter.      Reason for Telemedicine Visit: Patient has requested telehealth visit    Originating Site (Patient Location): Patient's home    Distant Site (Provider Location): Provider Remote Setting- Home Office    Consent:  The patient/guardian has verbally consented to: the potential risks and benefits of telemedicine (video visit) versus in person care; bill my insurance or make self-payment for services provided; and responsibility for payment of non-covered services.     Mode of Communication:  Video Conference via [employer]    As the provider I attest to compliance with applicable laws and regulations related to telemedicine.    Those present for this visit (Patient and Therapist)    Follow up Patient indicated that she is attempting to take more time off of work.  She is hesitant about restarting fencing club and would like to have further discussion today.  She is also making attempts to increase her socialization which in turn is helping improve mood.  Procrastination difficulty following through continue to be problematic.    New symptoms or complaints: Difficulty focusing, anxiety, frequently procrastinating, difficulty making deadlines, anxiety, depressed mood     Functional Impairment:   Personal: 3  Family: 2  Work: 3  Social:2    ASSESSMENT:  "Current Emotional / Mental Status (status of significant symptoms):              Risk status (Self / Other harm or suicidal ideation)              Patient denies any personal safety issues              Patient denies current or recent suicidal ideation or behaviors.              Patient denies current or recent homicidal ideation or behaviors.           Patient does not partake in any risk behaviors.           Patient indicated that she feels safe          Patient indicated that family is supportive and involved              Recommended that patient call 911 or go to the local ED should there be a change in any of these risk factors.                 Attitude:                                   Cooperative               Orientation:                             Oriented x3              Speech                                    Clear              Rate / Production:       Normal/ Responsive Normal                           Volume:                       Normal               Mood:                                      Normal              Thought Content:                    Clear               Thought Form:                        Coherent  Logical               Insight:                                     Good            Patient's impression of their current status: Patient stated, \"I am trying to take a bit more time off to help with my feelings of overwhelm this.\"  Patient stated, \"I am trying to decide whether or not I want to set up for the fencing club.      Therapist impression of patients current state: During today's session we discussed patient's continued struggles with procrastination, follow-through and emotional sensitivity.      Type of psychotherapeutic technique provided: Insight oriented and Solution-focused     Progress toward short term goals: Not reviewed today     Review of long term goals: Not done at today's visit  Treatment plan updated on 12/10/20      Diagnosis:  Adjustment disorder with anxiety and " depressed mood     Provisional diagnosis  ADHD (inattentive)     Plan and Follow up:   1.    Patient is scheduled for further evaluation/testing by psychology for diagnostic clarification        of provisional diagnosis/comorbidities and identify appropriate supportive and                intervention strategies  based on the information. (Unable to be evaluated until July)  2.    Patient will attempt to include exercise on a regular basis  3.   Continue to incorporate self-care modalities  4.    Next appointment in 3 weeks     Discharge Criteria/Planning: Patient will continue with follow-up until therapy can be discontinued without return of signs and symptoms.      I have reviewed the note as documented above.  This accurately captures the substance of my conversation with the patient.  As the provider I attest to compliance with applicable laws and regulations related to telemedicine.    4/28/2021   Genet KENNEDY

## 2021-06-18 ENCOUNTER — OFFICE VISIT (OUTPATIENT)
Dept: FAMILY MEDICINE | Facility: CLINIC | Age: 46
End: 2021-06-18
Payer: COMMERCIAL

## 2021-06-18 VITALS
OXYGEN SATURATION: 98 % | HEIGHT: 64 IN | RESPIRATION RATE: 16 BRPM | WEIGHT: 195 LBS | DIASTOLIC BLOOD PRESSURE: 78 MMHG | TEMPERATURE: 96.9 F | SYSTOLIC BLOOD PRESSURE: 130 MMHG | HEART RATE: 80 BPM | BODY MASS INDEX: 33.29 KG/M2

## 2021-06-18 DIAGNOSIS — F41.1 GAD (GENERALIZED ANXIETY DISORDER): ICD-10-CM

## 2021-06-18 DIAGNOSIS — Z00.00 ROUTINE GENERAL MEDICAL EXAMINATION AT A HEALTH CARE FACILITY: Primary | ICD-10-CM

## 2021-06-18 DIAGNOSIS — K76.0 FATTY LIVER: ICD-10-CM

## 2021-06-18 DIAGNOSIS — E66.811 CLASS 1 OBESITY DUE TO EXCESS CALORIES WITHOUT SERIOUS COMORBIDITY WITH BODY MASS INDEX (BMI) OF 33.0 TO 33.9 IN ADULT: ICD-10-CM

## 2021-06-18 DIAGNOSIS — Z13.6 CARDIOVASCULAR SCREENING; LDL GOAL LESS THAN 100: ICD-10-CM

## 2021-06-18 DIAGNOSIS — F33.1 MAJOR DEPRESSIVE DISORDER, RECURRENT EPISODE, MODERATE (H): ICD-10-CM

## 2021-06-18 DIAGNOSIS — E66.09 CLASS 1 OBESITY DUE TO EXCESS CALORIES WITHOUT SERIOUS COMORBIDITY WITH BODY MASS INDEX (BMI) OF 33.0 TO 33.9 IN ADULT: ICD-10-CM

## 2021-06-18 DIAGNOSIS — Z12.4 SCREENING FOR MALIGNANT NEOPLASM OF CERVIX: ICD-10-CM

## 2021-06-18 DIAGNOSIS — Z13.1 SCREENING FOR DIABETES MELLITUS: ICD-10-CM

## 2021-06-18 DIAGNOSIS — E03.9 ACQUIRED HYPOTHYROIDISM: ICD-10-CM

## 2021-06-18 DIAGNOSIS — K29.70 GASTRITIS WITHOUT BLEEDING, UNSPECIFIED CHRONICITY, UNSPECIFIED GASTRITIS TYPE: ICD-10-CM

## 2021-06-18 LAB
CHOLEST SERPL-MCNC: 162 MG/DL
GLUCOSE SERPL-MCNC: 89 MG/DL (ref 70–99)
HDLC SERPL-MCNC: 42 MG/DL
LDLC SERPL CALC-MCNC: 95 MG/DL
NONHDLC SERPL-MCNC: 120 MG/DL
TRIGL SERPL-MCNC: 123 MG/DL
TSH SERPL DL<=0.005 MIU/L-ACNC: 2.37 MU/L (ref 0.4–4)

## 2021-06-18 PROCEDURE — 80061 LIPID PANEL: CPT | Performed by: NURSE PRACTITIONER

## 2021-06-18 PROCEDURE — 99396 PREV VISIT EST AGE 40-64: CPT | Performed by: NURSE PRACTITIONER

## 2021-06-18 PROCEDURE — 84443 ASSAY THYROID STIM HORMONE: CPT | Performed by: NURSE PRACTITIONER

## 2021-06-18 PROCEDURE — 82947 ASSAY GLUCOSE BLOOD QUANT: CPT | Performed by: NURSE PRACTITIONER

## 2021-06-18 PROCEDURE — 87624 HPV HI-RISK TYP POOLED RSLT: CPT | Performed by: NURSE PRACTITIONER

## 2021-06-18 PROCEDURE — G0124 SCREEN C/V THIN LAYER BY MD: HCPCS | Performed by: PATHOLOGY

## 2021-06-18 PROCEDURE — G0145 SCR C/V CYTO,THINLAYER,RESCR: HCPCS | Performed by: NURSE PRACTITIONER

## 2021-06-18 PROCEDURE — 36415 COLL VENOUS BLD VENIPUNCTURE: CPT | Performed by: NURSE PRACTITIONER

## 2021-06-18 RX ORDER — LEVOTHYROXINE SODIUM 112 UG/1
112 TABLET ORAL DAILY
Qty: 90 TABLET | Refills: 3 | Status: SHIPPED | OUTPATIENT
Start: 2021-06-18 | End: 2022-06-21

## 2021-06-18 ASSESSMENT — ENCOUNTER SYMPTOMS
ARTHRALGIAS: 0
SHORTNESS OF BREATH: 0
SORE THROAT: 0
PARESTHESIAS: 0
CONSTIPATION: 0
EYE PAIN: 0
HEMATURIA: 0
NERVOUS/ANXIOUS: 0
DIARRHEA: 0
ABDOMINAL PAIN: 0
COUGH: 0
FREQUENCY: 0
DIZZINESS: 0
BREAST MASS: 0
WEAKNESS: 0
PALPITATIONS: 0
HEARTBURN: 0
FEVER: 0
HEMATOCHEZIA: 0
NAUSEA: 0
MYALGIAS: 0
DYSURIA: 0
JOINT SWELLING: 0
HEADACHES: 0
CHILLS: 0

## 2021-06-18 ASSESSMENT — ANXIETY QUESTIONNAIRES
6. BECOMING EASILY ANNOYED OR IRRITABLE: SEVERAL DAYS
7. FEELING AFRAID AS IF SOMETHING AWFUL MIGHT HAPPEN: SEVERAL DAYS
2. NOT BEING ABLE TO STOP OR CONTROL WORRYING: MORE THAN HALF THE DAYS
1. FEELING NERVOUS, ANXIOUS, OR ON EDGE: MORE THAN HALF THE DAYS
5. BEING SO RESTLESS THAT IT IS HARD TO SIT STILL: SEVERAL DAYS
3. WORRYING TOO MUCH ABOUT DIFFERENT THINGS: MORE THAN HALF THE DAYS
IF YOU CHECKED OFF ANY PROBLEMS ON THIS QUESTIONNAIRE, HOW DIFFICULT HAVE THESE PROBLEMS MADE IT FOR YOU TO DO YOUR WORK, TAKE CARE OF THINGS AT HOME, OR GET ALONG WITH OTHER PEOPLE: SOMEWHAT DIFFICULT
GAD7 TOTAL SCORE: 11

## 2021-06-18 ASSESSMENT — MIFFLIN-ST. JEOR: SCORE: 1519.51

## 2021-06-18 ASSESSMENT — PATIENT HEALTH QUESTIONNAIRE - PHQ9
SUM OF ALL RESPONSES TO PHQ QUESTIONS 1-9: 12
5. POOR APPETITE OR OVEREATING: MORE THAN HALF THE DAYS

## 2021-06-18 NOTE — RESULT ENCOUNTER NOTE
Bhavana,    Normal blood sugar, thyroid, and cholesterol.  I will refill our thyroid medication.  Great to meet you today.    Caar Calvo, CNP

## 2021-06-18 NOTE — PROGRESS NOTES
SUBJECTIVE:   CC: Bhavana Castellanos is an 45 year old woman who presents for preventive health visit.     {    Healthy Habits:     Getting at least 3 servings of Calcium per day:  Yes    Bi-annual eye exam:  NO    Dental care twice a year:  Yes    Sleep apnea or symptoms of sleep apnea:  None    Diet:  Regular (no restrictions)    Frequency of exercise:  2-3 days/week    Duration of exercise:  15-30 minutes    Taking medications regularly:  Yes    Medication side effects:  None    PHQ-2 Total Score: 2    Additional concerns today:  No          Hypothyroidism Follow-up      Since last visit, patient describes the following symptoms: Weight stable, no hair loss, no skin changes, no constipation, no loose stools    Here today to est care and for physical.  previously followed by Ana Rosales who is retiring.      Acute concerns:  none    Chronic problems:  Hypothyroidism- she is on synthroid 112mcg daily.  Normal TSH 5/2020.    MMD- follows with therapist, feels she has good coping skills.  Feels she is doing well mood wise.  Therapy has been helpful.  On prozac in the past, 10 years ago, hard to recall response, may have caused fatigue, didn't like being on it.  Didn't stay on it long.  struggling at work, will be undergoing evaluation for ADHD, working from home with less structure has been difficult.      Has seen [facility] 1/2021 for RUQ pain, US revealed fatty liver.  Endoscopy showed gastritis.  Treated with course of PPI.  Plan for follow up as needed.  Normal LFTs 4/2021.  Today reports occasional reoccurrence of pain but not as severe as it was.  Just uses tums as needed.    Health Screening:  Mammo:6/17/21    To Do:  Pap    Lives alone with 2 cats.  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  Self care includes meditation, exercise (fencing (for [employer]) and belly dancing ([employer])).  Does some walking.  Diet is all over the place.  Working with therapist, will be tested for ADHD in  July.  Family lives in [place].  Friends through fencing.  Pandemic was really hard but did ok.      Not sexually active.  Gets period every 3mo which is normal for her.      Today's PHQ-2 Score:   PHQ-2 ( 1999 [employer]) 6/16/2021   Q1: Little interest or pleasure in doing things 1   Q2: Feeling down, depressed or hopeless 1   PHQ-2 Score 2   Q1: Little interest or pleasure in doing things Several days   Q2: Feeling down, depressed or hopeless Several days   PHQ-2 Score 2       Abuse: Current or Past (Physical, Sexual or Emotional) - No  Do you feel safe in your environment? Yes    Have you ever done Advance Care Planning? (For example, a Health Directive, POLST, or a discussion with a medical provider or your loved ones about your wishes): No, advance care planning information given to patient to review.  Patient plans to discuss their wishes with loved ones or provider.      Social History     Tobacco Use     Smoking status: Never Smoker     Smokeless tobacco: Never Used   Substance Use Topics     Alcohol use: Yes     Comment: once per weeks      If you drink alcohol do you typically have >3 drinks per day or >7 drinks per week? No    Alcohol Use 6/18/2021   Prescreen: >3 drinks/day or >7 drinks/week? -   Prescreen: >3 drinks/day or >7 drinks/week? No   No flowsheet data found.    Reviewed orders with patient.  Reviewed health maintenance and updated orders accordingly - Yes  Labs reviewed in EPIC    Breast Cancer Screening:    Breast CA Risk Assessment (FHS-7) 6/16/2021   Do you have a family history of breast, colon, or ovarian cancer? No / Unknown         Mammogram Screening - Offered annual screening and updated Health Maintenance for mutual plan based on risk factor consideration    Pertinent mammograms are reviewed under the imaging tab.    History of abnormal Pap smear: NO - age 30-65 PAP every 5 years with negative HPV co-testing recommended  PAP / HPV Latest Ref Rng & Units 6/15/2018 6/9/2015 6/12/2012  "  PAP - NIL NIL NIL   HPV 16 DNA NEG:Negative Negative - -   HPV 18 DNA NEG:Negative Negative - -   OTHER HR HPV NEG:Negative Negative - -     Reviewed and updated as needed this visit by clinical staff  Tobacco  Allergies  Meds   Med Hx  Surg Hx  Fam Hx  Soc Hx        Reviewed and updated as needed this visit by Provider                Past Medical History:   Diagnosis Date     Depressive disorder 1994     Hypothyroidism       Past Surgical History:   Procedure Laterality Date     ENT SURGERY      wisdom teeth removal     EXCISE LESION HEAD N/A 7/17/2017    Procedure: EXCISE LESION HEAD;  (LOCAL) EXCISION OF RIGHT SCALP MASS AND LEFT POSTERIOR SCALP MASS x2;  Surgeon: Lori Acosta MD;  Location: [facility]     HEAD & NECK SURGERY  7/2018    cysts removed from scalp       Review of Systems   Constitutional: Negative for chills and fever.   HENT: Negative for congestion, ear pain, hearing loss and sore throat.    Eyes: Negative for pain and visual disturbance.   Respiratory: Negative for cough and shortness of breath.    Cardiovascular: Negative for chest pain, palpitations and peripheral edema.   Gastrointestinal: Negative for abdominal pain, constipation, diarrhea, heartburn, hematochezia and nausea.   Breasts:  Negative for tenderness, breast mass and discharge.   Genitourinary: Negative for dysuria, frequency, genital sores, hematuria, pelvic pain, urgency, vaginal bleeding and vaginal discharge.   Musculoskeletal: Negative for arthralgias, joint swelling and myalgias.   Skin: Negative for rash.   Neurological: Negative for dizziness, weakness, headaches and paresthesias.   Psychiatric/Behavioral: Negative for mood changes. The patient is not nervous/anxious.           OBJECTIVE:   /78 (BP Location: Right arm, Patient Position: Sitting, Cuff Size: Adult Large)   Pulse 80   Temp 96.9  F (36.1  C) (Temporal)   Resp 16   Ht 1.626 m (5' 4\")   Wt 88.5 kg (195 lb)   LMP 06/05/2021 (Exact Date)   SpO2 " 98%   BMI 33.47 kg/m    Physical Exam  GENERAL: healthy, alert and no distress  EYES: Eyes grossly normal to inspection, PERRL and conjunctivae and sclerae normal  HENT: ear canals and TM's normal, nose and mouth without ulcers or lesions  NECK: no adenopathy, no asymmetry, masses, or scars and thyroid normal to palpation  RESP: lungs clear to auscultation - no rales, rhonchi or wheezes  CV: regular rate and rhythm, normal S1 S2, no S3 or S4, no murmur, click or rub, no peripheral edema and peripheral pulses strong  ABDOMEN: soft, nontender, no hepatosplenomegaly, no masses and bowel sounds normal   (female): normal female external genitalia, normal urethral meatus, vaginal mucosa pink, moist, well rugated, and normal cervix/adnexa/uterus without masses or discharge  MS: no gross musculoskeletal defects noted, no edema  SKIN: no suspicious lesions or rashes  NEURO: Normal strength and tone, mentation intact and speech normal  PSYCH: mentation appears normal, affect normal/bright    Diagnostic Test Results:  Labs reviewed in Epic    ASSESSMENT/PLAN:   (Z00.00) Routine general medical examination at a health care facility  (primary encounter diagnosis)  Comment:   Plan: update labs, pap updated    (F33.1) Major depressive disorder, recurrent episode, moderate (H)  (F41.1) GILLES (generalized anxiety disorder)  Comment: also concern for underlying ADHD, psychological eval scheduled in 1 mo.  Seeing therapist, previously on prozac, moderate symptoms on screening today   Plan: discussed consideration of restarting medication give symptom severity.  She prefers to defer until psychological evaluation is complete.  Briefly discussed monitoring that is required of ADHD medication.  She will follow up after evaluation with copy     (E66.09,  Z68.33) Class 1 obesity due to excess calories without serious comorbidity with body mass index (BMI) of 33.0 to 33.9 in adult  Comment:   Plan: discussed diet and exercise for wt  "loss    (K29.70) Gastritis without bleeding, unspecified chronicity, unspecified gastritis type  Comment:   Plan: improved after course of PPI    (K76.0) Fatty liver  Comment:   Plan: monitor LFTs annually     (E03.9) Acquired hypothyroidism  Comment:   Plan: TSH with free T4 reflex        recheck    (Z13.6) CARDIOVASCULAR SCREENING; LDL GOAL LESS THAN 100  Comment:   Plan: Lipid panel reflex to direct LDL Fasting            (Z13.1) Screening for diabetes mellitus  Comment:   Plan: Glucose            (Z12.4) Screening for malignant neoplasm of cervix  Comment:   Plan: Pap imaged thin layer screen with HPV -         recommended age 30 - 65 years (select HPV order        below), HPV High Risk Types DNA Cervical                Patient has been advised of split billing requirements and indicates understanding: No  COUNSELING:  Reviewed preventive health counseling, as reflected in patient instructions    Estimated body mass index is 33.47 kg/m  as calculated from the following:    Height as of this encounter: 1.626 m (5' 4\").    Weight as of this encounter: 88.5 kg (195 lb).    Weight management plan: Discussed healthy diet and exercise guidelines    She reports that she has never smoked. She has never used smokeless tobacco.      Counseling Resources:  ATP IV Guidelines  Pooled Cohorts Equation Calculator  Breast Cancer Risk Calculator  BRCA-Related Cancer Risk Assessment: FHS-7 Tool  FRAX Risk Assessment  ICSI Preventive Guidelines  Dietary Guidelines for Americans, 2010  USDA's MyPlate  ASA Prophylaxis  Lung CA Screening    CLEVELAND Mo CNP  [facility]  "

## 2021-06-18 NOTE — PATIENT INSTRUCTIONS
1.  Follow up after ADHD evaluation, bring or have copy faxed  2.  Follow up sooner if you want to discuss treatment for depression/anxiety  3.  Labs and pap updated today    Preventive Health Recommendations  Female Ages 40 to 49    Yearly exam:     See your health care provider every year in order to  1. Review health changes.   2. Discuss preventive care.    3. Review your medicines if your doctor prescribed any.      Get a Pap test every three years (unless you have an abnormal result and your provider advises testing more often).      If you get Pap tests with HPV test, you only need to test every 5 years, unless you have an abnormal result. You do not need a Pap test if your uterus was removed (hysterectomy) and you have not had cancer.      You should be tested each year for STDs (sexually transmitted diseases), if you're at risk.     Ask your doctor if you should have a mammogram.      Have a colonoscopy (test for colon cancer) if someone in your family has had colon cancer or polyps before age 50.       Have a cholesterol test every 5 years.       Have a diabetes test (fasting glucose) after age 45. If you are at risk for diabetes, you should have this test every 3 years.    Shots: Get a flu shot each year. Get a tetanus shot every 10 years.     Nutrition:     Eat at least 5 servings of fruits and vegetables each day.    Eat whole-grain bread, whole-wheat pasta and brown rice instead of white grains and rice.    Get adequate Calcium and Vitamin D.      Lifestyle    Exercise at least 150 minutes a week (an average of 30 minutes a day, 5 days a week). This will help you control your weight and prevent disease.    Limit alcohol to one drink per day.    No smoking.     Wear sunscreen to prevent skin cancer.    See your dentist every six months for an exam and cleaning.

## 2021-06-19 ASSESSMENT — ANXIETY QUESTIONNAIRES: GAD7 TOTAL SCORE: 11

## 2021-06-23 LAB
COPATH REPORT: ABNORMAL
PAP: ABNORMAL

## 2021-06-25 PROBLEM — R87.610 ASCUS OF CERVIX WITH NEGATIVE HIGH RISK HPV: Status: ACTIVE | Noted: 2021-06-18

## 2021-06-25 NOTE — PROGRESS NOTES
Mental Health Visit Note    Patient: Bhavana Castellanos    : 1975 MRN: [id number]    2021    Start time: 1600    Stop Time: 1652  Session # 16    Session Type: Patient is presenting for an Individual session.     Bhavana Castellanos is a 45 y.o. female is being seen for follow-up visit.  Patient indicated that she has been experiencing anxiety depression, issues at work particularly since the onset of the pandemic.Based on patient's continued verbalization of problematic symptoms this warranted further evaluation for ADHD. .     Telemedicine Visit: The patient's condition can be safely assessed and treated via synchronous audio and visual telemedicine encounter.      Reason for Telemedicine Visit: Patient has requested telehealth visit    Originating Site (Patient Location): Patient's home    Distant Site (Provider Location): Provider Remote Setting- Home Office    Consent:  The patient/guardian has verbally consented to: the potential risks and benefits of telemedicine (video visit) versus in person care; bill my insurance or make self-payment for services provided; and responsibility for payment of non-covered services.     Mode of Communication:  Video Conference via "[employer]    As the provider I attest to compliance with applicable laws and regulations related to telemedicine.    Those present for this visit (Patient and Therapist)    Follow up  Scheduled  For testing . She is uncertain if she will be returning to the office or continue working remote. She will be visiting family in [place] for the holiday. Hasn't seen her parents  For over 1 1/2 yrs.      New symptoms or complaints: Difficulty focusing, anxiety, frequently procrastinating, difficulty making deadlines, anxiety, depressed mood    Functional Impairment:   Personal: 3  Family: 2  Work: 2  Social:2          ASSESSMENT: Current Emotional / Mental Status (status of significant symptoms):     Risk status (Self / Other harm or  suicidal ideation)              Patient denies any personal safety issues              Patient denies current or recent suicidal ideation or behaviors.              Patient denies current or recent homicidal ideation or behaviors.           Patient does not partake in any risk behaviors.           Patient indicated that she feels safe          Patient indicated that family is supportive and involved              Recommended that patient call 911 or go to the local ED should there be a change in any of these risk factors.                 Attitude:                                   Cooperative               Orientation:                             Oriented x3              Speech                                    Clear              Rate / Production:       Normal/ Responsive Normal                           Volume:                       Normal               Mood:                                      Normal              Thought Content:                    Clear               Thought Form:                        Coherent  Logical               Insight:                                     Good         Patient's impression of their current status: Looking forward to reconnecting with others this summer with family and friends.     Therapist impression of patients current state: During today's session we discussed strategies and approaches in order to establish structure and routine while working remote from home.  Patient indicated she has a tendency to get easily distracted and sidetracked rather than staying focused and completing work in a timely fashion.  A dry erase board indicating the daily schedule can be beneficial in structuring her day.  Also, establishing a room for her to work in separate from her living room and kitchen would be beneficial as well. In regard to sleep hygiene, keep electronics out of the bedroom and then utilize the sleep veronique once in bed.    Type of psychotherapeutic technique provided:  Insight oriented and Solution-focused    Progress toward short term goals: Not reviewed today    Review of long term goals: Not done at today's visit    Diagnosis:  Adjustment disorder with anxiety and depressed mood     Provisional diagnosis  ADHD (inattentive)     Plan and Follow up:   1.    Patient is scheduled for further evaluation/testing by psychology for diagnostic clarification        of provisional diagnosis/comorbidities and identify appropriate supportive and                 intervention strategies  based on the information. (Unable to be evaluated until July)  2.    Patient will attempt to include exercise on a regular basis  3.   Continue to incorporate self-care modalities  4.    Next appointment in 3 weeks     Discharge Criteria/Planning: Patient will continue with follow-up until therapy can be discontinued without return of signs and symptoms.      I have reviewed the note as documented above.  This accurately captures the substance of my conversation with the patient.  As the provider I attest to compliance with applicable laws and regulations related to telemedicine.  Performed and documented by (provider name)    Genet Chairez [facility]  5/26/21

## 2021-07-13 ASSESSMENT — ANXIETY QUESTIONNAIRES
GAD7 TOTAL SCORE: 7
8. IF YOU CHECKED OFF ANY PROBLEMS, HOW DIFFICULT HAVE THESE MADE IT FOR YOU TO DO YOUR WORK, TAKE CARE OF THINGS AT HOME, OR GET ALONG WITH OTHER PEOPLE?: SOMEWHAT DIFFICULT
2. NOT BEING ABLE TO STOP OR CONTROL WORRYING: SEVERAL DAYS
3. WORRYING TOO MUCH ABOUT DIFFERENT THINGS: SEVERAL DAYS
4. TROUBLE RELAXING: SEVERAL DAYS
7. FEELING AFRAID AS IF SOMETHING AWFUL MIGHT HAPPEN: MORE THAN HALF THE DAYS
5. BEING SO RESTLESS THAT IT IS HARD TO SIT STILL: NOT AT ALL
6. BECOMING EASILY ANNOYED OR IRRITABLE: SEVERAL DAYS
7. FEELING AFRAID AS IF SOMETHING AWFUL MIGHT HAPPEN: MORE THAN HALF THE DAYS
1. FEELING NERVOUS, ANXIOUS, OR ON EDGE: SEVERAL DAYS
GAD7 TOTAL SCORE: 7
GAD7 TOTAL SCORE: 7

## 2021-07-14 ASSESSMENT — ANXIETY QUESTIONNAIRES: GAD7 TOTAL SCORE: 7

## 2021-07-19 ENCOUNTER — VIRTUAL VISIT (OUTPATIENT)
Dept: PSYCHOLOGY | Facility: CLINIC | Age: 46
End: 2021-07-19
Payer: COMMERCIAL

## 2021-07-19 DIAGNOSIS — F33.1 MAJOR DEPRESSIVE DISORDER, RECURRENT EPISODE, MODERATE (H): Primary | ICD-10-CM

## 2021-07-19 DIAGNOSIS — Z13.39 ATTENTION DEFICIT HYPERACTIVITY DISORDER (ADHD) EVALUATION: ICD-10-CM

## 2021-07-19 PROCEDURE — 90834 PSYTX W PT 45 MINUTES: CPT | Mod: GT | Performed by: PSYCHOLOGIST

## 2021-07-19 ASSESSMENT — PATIENT HEALTH QUESTIONNAIRE - PHQ9
SUM OF ALL RESPONSES TO PHQ QUESTIONS 1-9: 9
SUM OF ALL RESPONSES TO PHQ QUESTIONS 1-9: 9
10. IF YOU CHECKED OFF ANY PROBLEMS, HOW DIFFICULT HAVE THESE PROBLEMS MADE IT FOR YOU TO DO YOUR WORK, TAKE CARE OF THINGS AT HOME, OR GET ALONG WITH OTHER PEOPLE: NOT DIFFICULT AT ALL

## 2021-07-19 NOTE — PROGRESS NOTES
Progress Note - Initial Visit    Client Name:  Bhavana Castellanos Date: 2021         Service Type: Individual     Visit Start Time: 1205  Visit End Time: 1255    Visit #: 1    Attendees: Client    Service Modality:  Video Visit:      Provider verified identity through the following two step process.  Patient provided:  Patient     Telemedicine Visit: The patient's condition can be safely assessed and treated via synchronous audio and visual telemedicine encounter.      Reason for Telemedicine Visit: Services only offered telehealth    Originating Site (Patient Location): Patient's home    Distant Site (Provider Location): Provider Remote Setting- Home Office    Consent:  The patient/guardian has verbally consented to: the potential risks and benefits of telemedicine (video visit) versus in person care; bill my insurance or make self-payment for services provided; and responsibility for payment of non-covered services.     Patient would like the video invitation sent by:  My Chart    Mode of Communication:  Video Conference via [employer]    As the provider I attest to compliance with applicable laws and regulations related to telemedicine.       DATA:   Interactive Complexity: No   Crisis: No     Presenting Concerns/  Current Stressors:   ADHD Evaluation    ASSESSMENT:  Mental Status Assessment:  Appearance:   Appropriate   Eye Contact:   Fair   Psychomotor Behavior: Normal   Attitude:   Cooperative   Orientation:   All  Speech   Rate / Production: Normal/ Responsive   Volume:  Normal   Mood:    Anxious   Affect:    Restricted   Thought Content:  Clear   Thought Form:  Goal Directed   Insight:    Fair     Safety Issues and Plan for Safety and Risk Management:     [place] Suicide Severity Rating Scale (Short Version)  [place] Suicide Severity Rating (Short Version) 2021   Over the past 2 weeks have you felt down, depressed, or hopeless? yes   Over the past 2 weeks have you had thoughts of  killing yourself? no   Have you ever attempted to kill yourself? no     Patient denies current fears or concerns for personal safety.  Patient denies current or recent suicidal ideation or behaviors.  Patient denies current or recent homicidal ideation or behaviors.  Patient denies current or recent self injurious behavior or ideation.  Patient denies other safety concerns.  Recommended that patient call 911 or go to the local ED should there be a change in any of these risk factors.  Patient reports there are no firearms in the house.     Diagnostic Criteria:  Depression and anxiety  Rule Out ADHD  WHODAS 2.0 (12 item): No flowsheet data found.  Intervention:   During today's session this writer outlined the expectations and purpose of the ADHD Evaluation including Notice of Billing. Ms. Castellanos discussed her reason for referral and symptoms. This writer used the Adult ADHD Evaluation Intake Form to guide the clinical interview, it was not finished. Her PHQ-9 and GILLES-7 scores were reviewed and risk assessed. A second session was scheduled to complete the clinical interview.   Collateral Reports Completed:  Routed note to Care Team Member(s)      PLAN: (Homework, other):  1. Provider will continue Diagnostic Assessment.      Lainey Mckeon, PhD LP  July 19, 2021        Answers for HPI/ROS submitted by the patient on 7/19/2021  If you checked off any problems, how difficult have these problems made it for you to do your work, take care of things at home, or get along with other people?: Not difficult at all  PHQ9 TOTAL SCORE: 9  GILLES 7 TOTAL SCORE: 7

## 2021-07-20 ASSESSMENT — PATIENT HEALTH QUESTIONNAIRE - PHQ9: SUM OF ALL RESPONSES TO PHQ QUESTIONS 1-9: 9

## 2021-07-26 ENCOUNTER — VIRTUAL VISIT (OUTPATIENT)
Dept: PSYCHOLOGY | Facility: CLINIC | Age: 46
End: 2021-07-26
Payer: COMMERCIAL

## 2021-07-26 DIAGNOSIS — F33.1 MAJOR DEPRESSIVE DISORDER, RECURRENT EPISODE, MODERATE (H): Primary | ICD-10-CM

## 2021-07-26 DIAGNOSIS — Z13.39 ATTENTION DEFICIT HYPERACTIVITY DISORDER (ADHD) EVALUATION: ICD-10-CM

## 2021-07-26 DIAGNOSIS — Z86.59 HISTORY OF POSTTRAUMATIC STRESS DISORDER (PTSD): ICD-10-CM

## 2021-07-26 PROCEDURE — 90837 PSYTX W PT 60 MINUTES: CPT | Mod: GT | Performed by: PSYCHOLOGIST

## 2021-07-26 NOTE — PROGRESS NOTES
Progress Note - Initial Visit    Client Name:  Bhavana Castellanos Date: 2021         Service Type: Individual     Visit Start Time: 1406  Visit End Time: 1500    Visit #: 2    Attendees: Client    Service Modality:  Video Visit:      Provider verified identity through the following two step process.  Patient provided:  Patient     Telemedicine Visit: The patient's condition can be safely assessed and treated via synchronous audio and visual telemedicine encounter.      Reason for Telemedicine Visit: Services only offered telehealth    Originating Site (Patient Location): Patient's home    Distant Site (Provider Location): Provider Remote Setting- Home Office    Consent:  The patient/guardian has verbally consented to: the potential risks and benefits of telemedicine (video visit) versus in person care; bill my insurance or make self-payment for services provided; and responsibility for payment of non-covered services.     Patient would like the video invitation sent by:  My Chart    Mode of Communication:  Video Conference via [employer]    As the provider I attest to compliance with applicable laws and regulations related to telemedicine.       DATA:   Interactive Complexity: No   Crisis: No   Extended Session (53+ minutes):   - Treatment protocol required additional time to complete, due to the nature of diagnosis being treated.  See Interventions section for details      Presenting Concerns/  Current Stressors:   ADHD Evaluation    ASSESSMENT:  Mental Status Assessment:  Appearance:   Appropriate   Eye Contact:   Fair   Psychomotor Behavior: Restless   Attitude:   Cooperative   Orientation:   All  Speech   Rate / Production: Normal/ Responsive   Volume:  Normal   Mood:    Anxious  Dysphoric  Affect:    Restricted   Thought Content:  Clear   Thought Form:  Goal Directed   Insight:    Fair       Safety Issues and Plan for Safety and Risk Management:     [place] Suicide Severity Rating Scale  (Short Version)  [place] Suicide Severity Rating (Short Version) 7/19/2021   Over the past 2 weeks have you felt down, depressed, or hopeless? yes   Over the past 2 weeks have you had thoughts of killing yourself? no   Have you ever attempted to kill yourself? no     Patient denies current fears or concerns for personal safety.  Patient denies current or recent suicidal ideation or behaviors.  Patient denies current or recent homicidal ideation or behaviors.  Patient denies current or recent self injurious behavior or ideation.  Patient denies other safety concerns.  Recommended that patient call 911 or go to the local ED should there be a change in any of these risk factors.     Diagnostic Criteria:  Hx of trauma  MDD  Rule Out ADHD    WHODAS 2.0 (12 item): No flowsheet data found.  Intervention:   During today's session, this writer worked with Ms. Castellanos to complete the ADHD Evaluation Intake Form; it was completed. She also provided general background information including developmental, relational, and chemical. The section of mental health history was not completed. A third session was completed. She was sent the Micheal Reports to complete between sessions.   Collateral Reports Completed:  Routed note to Care Team Member(s)      PLAN: (Homework, other):  1. Provider will continue Diagnostic Assessment.   2. Complete Micheal Reports  3. Administer MMPI-2      Lainey Mckeon, PhD LP  July 26, 2021

## 2021-07-28 ENCOUNTER — VIRTUAL VISIT (OUTPATIENT)
Dept: BEHAVIORAL HEALTH | Facility: CLINIC | Age: 46
End: 2021-07-28
Payer: COMMERCIAL

## 2021-07-28 DIAGNOSIS — F43.23 ADJUSTMENT DISORDER WITH MIXED ANXIETY AND DEPRESSED MOOD: Primary | ICD-10-CM

## 2021-07-28 PROCEDURE — 90834 PSYTX W PT 45 MINUTES: CPT | Mod: GT | Performed by: SOCIAL WORKER

## 2021-07-28 NOTE — PROGRESS NOTES
Progress Note    Patient Name: Bhavana Castellanos  Date: 7/28/21         Service Type: Individual      Session Start Time: 1600  Session End Time: 1652     Session Length: 52    Session #: 18    Attendees: Client attended alone    Service Modality:  Video Visit:      Provider verified identity through the following two step process.  Patient provided:  Patient is known previously to provider    Telemedicine Visit: The patient's condition can be safely assessed and treated via synchronous audio and visual telemedicine encounter.      Reason for Telemedicine Visit: Patient has requested telehealth visit    Originating Site (Patient Location): Patient's home    Distant Site (Provider Location): Provider Remote Setting- Home Office    Consent:  The patient/guardian has verbally consented to: the potential risks and benefits of telemedicine (video visit) versus in person care; bill my insurance or make self-payment for services provided; and responsibility for payment of non-covered services.     Patient would like the video invitation sent by:  My Chart    Mode of Communication:  Video Conference via [employer]    As the provider I attest to compliance with applicable laws and regulations related to telemedicine.     Treatment Plan Last Reviewed:   PHQ-9 / GILLES-7 :   GILLES-7 SCORE 1/25/2021 6/18/2021 7/13/2021   Total Score - - -   Total Score - - 7 (mild anxiety)   Total Score 5 11 7     PHQ 1/25/2021 6/18/2021 7/19/2021   PHQ-9 Total Score 2 12 9   Q9: Thoughts of better off dead/self-harm past 2 weeks Not at all Not at all Not at all       DATA  Interactive Complexity: No  Crisis: No       Progress Since Last Session (Related to Symptoms / Goals / Homework):   Symptoms: Worsening Is feeling very frustrated regarding the lengthy process of testing for ADHD.       Episode of Care Goals: Minimal progress - CONTEMPLATION (Considering change and yet undecided); Intervened by assessing  the negative and positive thinking (ambivalence) about behavior change     Current / Ongoing Stressors and Concerns:   Patient indicated that work is been stressful due to decreased staff.  Uncertainty regarding outcome of evaluation.     Treatment Objective(s) Addressed in This Session:   participate in Fencing and dance class  activities to improve mood  During today's session patient appeared a bit more sullen and tired.  She was able to verbalize thoughts and feelings and plan activities that can help improve mood.     Intervention:   CBT and solution focused strategies and approaches        ASSESSMENT: Current Emotional / Mental Status (status of significant symptoms):   Risk status (Self / Other harm or suicidal ideation)   Patient denies current fears or concerns for personal safety.   Patient denies current or recent suicidal ideation or behaviors.   Patient denies current or recent homicidal ideation or behaviors.   Patient denies current or recent self injurious behavior or ideation.   Patient denies other safety concerns.   Patient reports there has been no change in risk factors since their last session.     Patient reports there has been no change in protective factors since their last session.     Recommended that patient call 911 or go to the local ED should there be a change in any of these risk factors.     Appearance:   Appropriate    Eye Contact:   Good    Psychomotor Behavior: Normal    Attitude:   Cooperative    Orientation:   All   Speech    Rate / Production: Normal/ Responsive Normal     Volume:  Normal    Mood:    Sad    Affect:    Appropriate    Thought Content:  Clear    Thought Form:  Coherent  Logical    Insight:    Good      Medication Review:   No current psychiatric medications prescribed     Medication Compliance:   Yes     Changes in Health Issues:   None reported     Chemical Use Review:   Substance Use: Chemical use reviewed, no active concerns identified      Tobacco Use: No  current tobacco use.      Diagnosis:  Adjustment disorder with anxiety and depressed mood     Provisional diagnosis  ADHD (inattentive)     Plan and Follow up:   1.   Patient to complete evaluation/testing by psychology for diagnostic clarification of provisional         diagnosis/comorbidities and identify appropriate supportive and                intervention strategies  based on the information.   2.    Patient will attempt to include exercise on a regular basis  3.   Continue to incorporate self-care modalities  4.    Next appointment in 3 weeks     Collateral Reports Completed:   Routed note to PCP      Genet Chairez LICJEFFRY    21                                                       ______________________________________________________________________    Outpatient Mental Health Treatment Plan        Name:  Bhavana Castellanos   :  [birth year]  MRN:  [id number]  Treatment Plan:  Initial Treatment Plan updated on 12/10/20  Intake/initial treatment plan date:  20 updated on 21  Benefit and risks and alternatives have been discussed: Yes  Is this treatment appropriate with minimal intrusion/restrictions: Yes  Estimated duration of treatment:  Approximately 8-10 sessions sessions.  Anticipated frequency of services:  Every 2 weeks  Necessity for frequency: This frequency is needed to establish therapeutic goals and for continuity of care in order to monitor progress.  Necessity for treatment: To address cognitive, behavioral, and/or emotional barriers in order to work toward goals and to improve quality of life.     Session Type: Patient is presenting for an Individual session.     Plan:                                                                              ?              ? Anxiety          Stabilize Anxiety level while increasing ability to function on a daily basis     Goal:               Strategies:       ? [x]???Learn and practice relaxation techniques and other coping  strategies (e.g., thought  stopping,                                           reframing, meditation)                                      ? [x]??? Increase involvement in meaningful activities                                      ? [x]??? Discuss sleep hygiene                                      ? [x]??? Explore thoughts and expectations about self and others                                      ? [x]??? Identify and monitor triggers for panic/anxiety symptoms                                      ? - Implement physical activity routine (with physician approval)                                      ? [x]??? Increase social activities.                                      ? [x]??? Continue compliance with medical treatment plan (or explore  barriers)                                                  [x]???Assertiveness skills building techniques     Degree to which this is a problem: 2  Degree to which goal is met:   Date of Review: 9/23/21       ?   Depression                  Goal:      Alleviate depressed mood and return to previous level of effective functioning..              Strategies:       ?[x]??? Decrease social isolation                                      [x]??? Increase involvement in meaningful activities                                      ?[x]??? Discuss sleep hygiene                                      ?[x]??? Explore thoughts and expectations about self and others                                      ?[x]??? Process grief (loss of significant person, independence, role,  etc.)                                      ?[x]??? Assess for suicide risk                                      ?[x]??? Implement physical activity routine (with physician approval)                                      []??? scheduled for testing in July to assess for possible ADHD                                      [x]??? Continue compliance with medical treatment plan (or explore barriers)     Degree to which this is a problem: 2  Degree to which goal  is met: 2  Date of Review:9/23/21                             ?  ?     Functional Impairment:  1=Not at all/Rarely  2=Some days  3=Most Days  4=Every Day    Personal : 2  Family : 1  Social : 32  Work/school: 0     Diagnosis:   1. Adjustment Reaction with Anxiety and Depressed Mood     2. Provisional Dx  ADHD (Inattentive)     WHODAS 2.0 12-item version 0.3%       Scores presented in qualifiers to represent level of disability.   NO Problem (none, absent, negligible,...) 0-4%     H1= 1  H2= 1  H3= 0     Clinical assessments and measures completed:. GILLES-7 and PHQ-9, WHOADAS     Strengths:  Intelegent  Limitations:  Isolation due to Pandemic  Cultural Considerations: Pt. Did not indicate any need for cultural considerations.     Persons responsible for this plan: Patient    Genet Chairez [facility]  6/21/21

## 2021-08-18 ENCOUNTER — TELEPHONE (OUTPATIENT)
Dept: PSYCHOLOGY | Facility: CLINIC | Age: 46
End: 2021-08-18

## 2021-08-20 ENCOUNTER — VIRTUAL VISIT (OUTPATIENT)
Dept: PSYCHOLOGY | Facility: CLINIC | Age: 46
End: 2021-08-20
Payer: COMMERCIAL

## 2021-08-20 DIAGNOSIS — Z86.59 HISTORY OF POSTTRAUMATIC STRESS DISORDER (PTSD): ICD-10-CM

## 2021-08-20 DIAGNOSIS — Z86.59: ICD-10-CM

## 2021-08-20 DIAGNOSIS — Z13.39 ATTENTION DEFICIT HYPERACTIVITY DISORDER (ADHD) EVALUATION: ICD-10-CM

## 2021-08-20 DIAGNOSIS — F33.1 MAJOR DEPRESSIVE DISORDER, RECURRENT EPISODE, MODERATE (H): Primary | ICD-10-CM

## 2021-08-20 PROCEDURE — 90791 PSYCH DIAGNOSTIC EVALUATION: CPT | Mod: GT | Performed by: PSYCHOLOGIST
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
## 2021-08-23 ENCOUNTER — VIRTUAL VISIT (OUTPATIENT)
Dept: BEHAVIORAL HEALTH | Facility: CLINIC | Age: 46
End: 2021-08-23
Payer: COMMERCIAL

## 2021-08-23 DIAGNOSIS — F43.23 ADJUSTMENT DISORDER WITH MIXED ANXIETY AND DEPRESSED MOOD: Primary | ICD-10-CM

## 2021-08-23 PROCEDURE — 90834 PSYTX W PT 45 MINUTES: CPT | Mod: GT | Performed by: SOCIAL WORKER

## 2021-08-23 NOTE — PROGRESS NOTES
Progress Note    Patient Name: Bhavana Castellanos  Date: 8/23/21         Service Type: Individual      Session Start Time: 1600  Session End Time: 1652     Session Length: 52     Session #: 19    Attendees: Client attended alone    Service Modality:  Video Visit:      Provider verified identity through the following two step process.  Patient provided:  Patient is known previously to provider    Telemedicine Visit: The patient's condition can be safely assessed and treated via synchronous audio and visual telemedicine encounter.      Reason for Telemedicine Visit: Patient has requested telehealth visit    Originating Site (Patient Location): Patient's home    Distant Site (Provider Location): Provider Remote Setting- Home Office    Consent:  The patient/guardian has verbally consented to: the potential risks and benefits of telemedicine (video visit) versus in person care; bill my insurance or make self-payment for services provided; and responsibility for payment of non-covered services.     Patient would like the video invitation sent by:  Send to e-mail at: [email or web address]    Mode of Communication:  Video Conference via [employer]    As the provider I attest to compliance with applicable laws and regulations related to telemedicine.     Treatment Plan Last Reviewed: 8/24/21  PHQ-9 / GILLES-7 :   GILLES-7 SCORE 1/25/2021 6/18/2021 7/13/2021   Total Score - - -   Total Score - - 7 (mild anxiety)   Total Score 5 11 7       PHQ 1/25/2021 6/18/2021 7/19/2021   PHQ-9 Total Score 2 12 9   Q9: Thoughts of better off dead/self-harm past 2 weeks Not at all Not at all Not at all       DATA  Interactive Complexity: No  Crisis: No       Progress Since Last Session (Related to Symptoms / Goals / Homework):   Symptoms: Poor concentration, difficulty following through, poor organization, feeling overwhelmed at this time due to additional work responsibilities.    Homework: Partially  completed      Episode of Care Goals: Minimal progress - PREPARATION (Decided to change - considering how); Intervened by negotiating a change plan and determining options / strategies for behavior change, identifying triggers, exploring social supports, and working towards setting a date to begin behavior change     Current / Ongoing Stressors and Concerns:  Stressful issues at work due to decreased staff.  Uncertainty regarding outcome of evaluation.. Patient indicated that resources in regard to management of ADHD symptoms have been helpful.      Treatment Objective(s) Addressed in This Session:   Looking forward to fencing and dancing class activities that help with mood.  Is going into the office 2 days/week.  Planning on going to [place] friends this coming weekend.. Feeling optimistic regarding those activities.     Intervention:   CBT solution focused strategies and approaches organizational strategies techniques        ASSESSMENT: Current Emotional / Mental Status (status of significant symptoms):  Risk status (Self / Other harm or suicidal ideation)              Patient denies current fears or concerns for personal safety.              Patient denies current or recent suicidal ideation or behaviors.              Patient denies current or recent homicidal ideation or behaviors.              Patient denies current or recent self injurious behavior or ideation.              Patient denies other safety concerns.              Patient reports there has been no change in risk factors since their last session.                Patient reports there has been no change in protective factors since their last session.                Recommended that patient call 911 or go to the local ED should there be a change in any of these risk factors.                 Appearance:                            Appropriate               Eye Contact:                           Good               Psychomotor Behavior:          Normal                Attitude:                                   Cooperative               Orientation:                             All              Speech                          Rate / Production:       Normal/ Responsive Normal                           Volume:                       Normal               Mood:                                      Sad               Affect:                                      Appropriate               Thought Content:                    Clear               Thought Form:                        Coherent  Logical               Insight:                                     Good         Medication Review:   No changes to current psychiatric medication(s)     Medication Compliance:   Yes     Changes in Health Issues:   None reported     Chemical Use Review:   Substance Use: Chemical use reviewed, no active concerns identified      Tobacco Use: No current tobacco use.      Diagnosis:  Diagnosis:   1. Adjustment Reaction with Anxiety and Depressed Mood     2. Provisional Dx  ADHD (Inattentive)    Collateral Reports Completed:   Routed note to PCP    PLAN: (Patient Tasks / Therapist Tasks / Other)    1.   Patient to complete evaluation/testing by psychology for diagnostic clarification of provisional diagnosis/comorbidities and identify appropriate supportive          and intervention strategies  based on the information.   2.   Patient will continue to participate in social activities fencing and dancing  3.   Continue to incorporate self-care modalities  4.    Next appointment in 3 weeks      Genet Chairez [facility]  21                                                         ______________________________________________________________________    Outpatient Mental Health Treatment Plan        Name:  Bhavana Castellanos   :  [birth year]  MRN:  [id number]  Treatment Plan:  Initial Treatment Plan updated on 12/10/20  Intake/initial treatment plan date:  20 updated on 21  Benefit and risks and  alternatives have been discussed: Yes  Is this treatment appropriate with minimal intrusion/restrictions: Yes  Estimated duration of treatment:  Approximately 8-10 sessions sessions.  Anticipated frequency of services:  Every 2 weeks  Necessity for frequency: This frequency is needed to establish therapeutic goals and for continuity of care in order to monitor progress.  Necessity for treatment: To address cognitive, behavioral, and/or emotional barriers in order to work toward goals and to improve quality of life.     Session Type: Patient is presenting for an Individual session.     Plan:                                                                              ?              ? Anxiety          Stabilize Anxiety level while increasing ability to function on a daily basis     Goal:               Strategies:       ? [x]????Learn and practice relaxation techniques and other coping  strategies (e.g., thought stopping,                                           reframing, meditation)                                      ? [x]???? Increase involvement in meaningful activities                                      ? [x]???? Discuss sleep hygiene                                      ? [x]???? Explore thoughts and expectations about self and others                                      ? [x]???? Identify and monitor triggers for panic/anxiety symptoms                                      ? - Implement physical activity routine (with physician approval)                                      ? [x]???? Increase social activities.                                      ? [x]???? Continue compliance with medical treatment plan (or explore  barriers)                                                  [x]????Assertiveness skills building techniques     Degree to which this is a problem: 2  Degree to which goal is met:   Date of Review: 9/23/21       ?   Depression                  Goal:      Alleviate depressed mood and return to  previous level of effective functioning..              Strategies:       ?[x]???? Decrease social isolation                                      [x]???? Increase involvement in meaningful activities                                      ?[x]???? Discuss sleep hygiene                                      ?[x]???? Explore thoughts and expectations about self and others                                      ?[x]???? Process grief (loss of significant person, independence, role,  etc.)                                      ?[x]???? Assess for suicide risk                                      ?[x]???? Implement physical activity routine (with physician approval)                                      []???? scheduled for testing in July to assess for possible ADHD                                      [x]???? Continue compliance with medical treatment plan (or explore barriers)     Degree to which this is a problem: 2  Degree to which goal is met: 2  Date of Review:9/23/21                             ?  ?     Functional Impairment:  1=Not at all/Rarely  2=Some days  3=Most Days  4=Every Day    Personal : 2  Family : 1  Social : 32  Work/school: 0     Diagnosis:   1. Adjustment Reaction with Anxiety and Depressed Mood     2. Provisional Dx  ADHD (Inattentive)     WHODAS 2.0 12-item version 0.3%       Scores presented in qualifiers to represent level of disability.   NO Problem (none, absent, negligible,...) 0-4%     H1= 1  H2= 1  H3= 0     Clinical assessments and measures completed:. GILLES-7 and PHQ-9, WHOADAS     Strengths:  Intelegent  Limitations:  Isolation due to Pandemic  Cultural Considerations: Pt. Did not indicate any need for cultural considerations.     Persons responsible for this plan: Patient     Genet Chairez [facility]  6/21/21

## 2021-09-04 ENCOUNTER — HEALTH MAINTENANCE LETTER (OUTPATIENT)
Age: 46
End: 2021-09-04

## 2021-09-20 ENCOUNTER — VIRTUAL VISIT (OUTPATIENT)
Dept: BEHAVIORAL HEALTH | Facility: CLINIC | Age: 46
End: 2021-09-20
Payer: COMMERCIAL

## 2021-09-20 DIAGNOSIS — F43.23 ADJUSTMENT DISORDER WITH MIXED ANXIETY AND DEPRESSED MOOD: Primary | ICD-10-CM

## 2021-09-20 PROCEDURE — 90834 PSYTX W PT 45 MINUTES: CPT | Mod: GT | Performed by: SOCIAL WORKER

## 2021-09-20 NOTE — PROGRESS NOTES
Progress Note    Patient Name: Bhavana Castellanos  Date: 9/20/21         Service Type: Individual      Session Start Time: 1500  Session End Time: 1552     Session Length: 52    Session #: 20    Attendees: Client attended alone    Service Modality:  Video Visit:      Provider verified identity through the following two step process.  Patient provided:  Patient is known previously to provider    Telemedicine Visit: The patient's condition can be safely assessed and treated via synchronous audio and visual telemedicine encounter.      Reason for Telemedicine Visit: Patient has requested telehealth visit    Originating Site (Patient Location): Patient's home    Distant Site (Provider Location): Provider Remote Setting- Home Office    Consent:  The patient/guardian has verbally consented to: the potential risks and benefits of telemedicine (video visit) versus in person care; bill my insurance or make self-payment for services provided; and responsibility for payment of non-covered services.     Patient would like the video invitation sent by:  Send to e-mail at: [email or web address]    Mode of Communication:  Video Conference via Amwell    As the provider I attest to compliance with applicable laws and regulations related to telemedicine.     Treatment Plan Last Reviewed:   PHQ-9 / GILLES-7 :  PHQ 1/25/2021 6/18/2021 7/19/2021   PHQ-9 Total Score 2 12 9   Q9: Thoughts of better off dead/self-harm past 2 weeks Not at all Not at all Not at all     .  GILLES-7 SCORE 1/25/2021 6/18/2021 7/13/2021   Total Score - - -   Total Score - - 7 (mild anxiety)   Total Score 5 11 7     DATA  Interactive Complexity: No  Crisis: No       Progress Since Last Session (Related to Symptoms / Goals / Homework):   Symptoms: Some improvement     Homework: Achieved / completed to satisfaction      Episode of Care Goals: Achieved / completed to satisfaction - ACTION (Actively working towards change);  Intervened by reinforcing change plan / affirming steps taken     Episode of Care Goals: Minimal progress - PREPARATION (Decided to change - considering how); Intervened by negotiating a change plan and determining options / strategies for behavior change, identifying triggers, exploring social supports, and working towards setting a date to begin behavior change.  Patient indicated that she obtain a timer cube in order to help stay focused and structured with her time.                 Current / Ongoing Stressors and Concerns:  Is going into work 2 times per week.  Patient indicated she does well with more structure where she has less distractions and also motivated to get work done to get home.  Continues to enjoy participating in her social activities.  Patient indicated that her dentist has noticed teeth grinding.    Patient is attempting to incorporate stress relieving activities prior to bed.          Treatment Objective(s) Addressed in This Session:          Looking forward to fencing and dancing class activities that help with mood.  Is going into the office 2 days/week.  Indicated that her parents are coming for the weekend and she is looking forward to the visit.                  Intervention:              CBT solution focused strategies and approaches organizational strategies techniques                              ASSESSMENT: Current Emotional / Mental Status (status of significant symptoms):  Risk status (Self / Other harm or suicidal ideation)              Patient denies current fears or concerns for personal safety.              Patient denies current or recent suicidal ideation or behaviors.              Patient denies current or recent homicidal ideation or behaviors.              Patient denies current or recent self injurious behavior or ideation.              Patient denies other safety concerns.              Patient reports there has been no change in risk factors since their last  session.                Patient reports there has been no change in protective factors since their last session.                Recommended that patient call 911 or go to the local ED should there be a change in any of these risk factors.                 Appearance:                            Appropriate               Eye Contact:                           Good               Psychomotor Behavior:          Normal               Attitude:                                   Cooperative               Orientation:                             All              Speech                          Rate / Production:       Normal/ Responsive Normal                           Volume:                       Normal               Mood:                                      Sad               Affect:                                      Appropriate               Thought Content:                    Clear               Thought Form:                        Coherent  Logical               Insight:                                     Good                         Medication Review:              No changes to current psychiatric medication(s) her dentist prescribed prednisone for teeth grinding at night.                 Medication Compliance:              Yes                 Changes in Health Issues:              None reported (grinding teeth at night)                 Chemical Use Review:              Substance Use: Chemical use reviewed, no active concerns identified                  Tobacco Use: No current tobacco use.       Diagnosis:  Diagnosis:   1.  Adjustment disorder with anxiety and depressed mood     2. Provisional Dx  ADHD (Inattentive)     Collateral Reports Completed:              Routed note to PCP     PLAN: (Patient Tasks / Therapist Tasks / Other)     1.   Patient to complete evaluation/testing by psychology for diagnostic clarification of provisional diagnosis/comorbidities and identify appropriate supportive   and intervention  strategies  based on the information.   2.   Patient will continue to participate in social activities fencing and dancing  3.   Continue to incorporate self-care modalities and organizational tools  4.    Next appointment in 3 weeks        Genet KENNEDY  22                                                           ______________________________________________________________________     Outpatient Mental Health Treatment Plan        Name:  Bhavana Castellanos   :  [birth year]  MRN:  [id number]  Treatment Plan:  Initial Treatment Plan updated on 12/10/20  Intake/initial treatment plan date:  20 updated on 21  Benefit and risks and alternatives have been discussed: Yes  Is this treatment appropriate with minimal intrusion/restrictions: Yes  Estimated duration of treatment:  Approximately 8-10 sessions sessions.  Anticipated frequency of services:  Every 2 weeks  Necessity for frequency: This frequency is needed to establish therapeutic goals and for continuity of care in order to monitor progress.  Necessity for treatment: To address cognitive, behavioral, and/or emotional barriers in order to work toward goals and to improve quality of life.     Session Type: Patient is presenting for an Individual session.     Plan:                                                                              ?              ? Anxiety          Stabilize Anxiety level while increasing ability to function on a daily basis     Goal:               Strategies:       ? [x]?????Learn and practice relaxation techniques and other coping  strategies (e.g., thought stopping,                                           reframing, meditation)                                      ? [x]????? Increase involvement in meaningful activities                                      ? [x]????? Discuss sleep hygiene                                      ? [x]????? Explore thoughts and expectations about self and  others                                      ? [x]????? Identify and monitor triggers for panic/anxiety symptoms                                      ? - Implement physical activity routine (with physician approval)                                      ? [x]????? Increase social activities.                                      ? [x]????? Continue compliance with medical treatment plan (or explore  barriers)                                                  [x]?????Assertiveness skills building techniques     Degree to which this is a problem: 2  Degree to which goal is met:   Date of Review: 9/23/21       ?   Depression                  Goal:      Alleviate depressed mood and return to previous level of effective functioning..              Strategies:       ?[x]????? Decrease social isolation                                      [x]????? Increase involvement in meaningful activities                                      ?[x]????? Discuss sleep hygiene                                      ?[x]????? Explore thoughts and expectations about self and others                                      ?[x]????? Process grief (loss of significant person, independence, role,  etc.)                                      ?[x]????? Assess for suicide risk                                      ?[x]????? Implement physical activity routine (with physician approval)                                      []????? scheduled for testing in July to assess for possible ADHD                                      [x]????? Continue compliance with medical treatment plan (or explore barriers)     Degree to which this is a problem: 2  Degree to which goal is met: 2  Date of Review:9/23/21                             ?  ?     Functional Impairment:  1=Not at all/Rarely  2=Some days  3=Most Days  4=Every Day    Personal : 2  Family : 1  Social : 32  Work/school: 0     Diagnosis:   1. Adjustment Reaction with Anxiety and Depressed Mood     2. Provisional  Dx  ADHD (Inattentive)     WHODAS 2.0 12-item version 0.3%       Scores presented in qualifiers to represent level of disability.   NO Problem (none, absent, negligible,...) 0-4%     H1= 1  H2= 1  H3= 0     Clinical assessments and measures completed:. GILLES-7 and PHQ-9, WHOADAS     Strengths:  Intelegent  Limitations:  Isolation due to Pandemic  Cultural Considerations: Pt. Did not indicate any need for cultural considerations.     Persons responsible for this plan: Patient     Genet Chairez [facility]  6/21/21                                                          ______________________________________________________________________

## 2021-10-18 ENCOUNTER — VIRTUAL VISIT (OUTPATIENT)
Dept: PSYCHOLOGY | Facility: CLINIC | Age: 46
End: 2021-10-18
Payer: COMMERCIAL

## 2021-10-18 ENCOUNTER — FCC EXTENDED DOCUMENTATION (OUTPATIENT)
Dept: PSYCHOLOGY | Facility: CLINIC | Age: 46
End: 2021-10-18

## 2021-10-18 DIAGNOSIS — F33.1 MAJOR DEPRESSIVE DISORDER, RECURRENT EPISODE, MODERATE (H): ICD-10-CM

## 2021-10-18 DIAGNOSIS — F90.9 ATTENTION DEFICIT HYPERACTIVITY DISORDER (ADHD), UNSPECIFIED ADHD TYPE: ICD-10-CM

## 2021-10-18 DIAGNOSIS — F43.10 POSTTRAUMATIC STRESS DISORDER: Primary | ICD-10-CM

## 2021-10-18 PROCEDURE — 96130 PSYCL TST EVAL PHYS/QHP 1ST: CPT | Mod: 95 | Performed by: PSYCHOLOGIST

## 2021-10-18 PROCEDURE — 2894A PR PSYCH TEST EVAL, INTERP & REPORT (MINISTERIAL), EA ADDL HR: CPT | Mod: 95 | Performed by: PSYCHOLOGIST
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
## 2021-10-21 ENCOUNTER — VIRTUAL VISIT (OUTPATIENT)
Dept: BEHAVIORAL HEALTH | Facility: CLINIC | Age: 46
End: 2021-10-21
Payer: COMMERCIAL

## 2021-10-21 DIAGNOSIS — F90.9 ATTENTION DEFICIT HYPERACTIVITY DISORDER (ADHD), UNSPECIFIED ADHD TYPE: Primary | ICD-10-CM

## 2021-10-21 PROCEDURE — 90834 PSYTX W PT 45 MINUTES: CPT | Mod: GT | Performed by: SOCIAL WORKER

## 2021-10-23 ASSESSMENT — ANXIETY QUESTIONNAIRES
2. NOT BEING ABLE TO STOP OR CONTROL WORRYING: SEVERAL DAYS
1. FEELING NERVOUS, ANXIOUS, OR ON EDGE: SEVERAL DAYS
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
## 2021-11-09 ENCOUNTER — VIRTUAL VISIT (OUTPATIENT)
Dept: BEHAVIORAL HEALTH | Facility: CLINIC | Age: 46
End: 2021-11-09
Payer: COMMERCIAL

## 2021-11-09 DIAGNOSIS — F90.9 ATTENTION DEFICIT HYPERACTIVITY DISORDER (ADHD), UNSPECIFIED ADHD TYPE: Primary | ICD-10-CM

## 2021-11-09 PROCEDURE — 90834 PSYTX W PT 45 MINUTES: CPT | Mod: GT | Performed by: SOCIAL WORKER

## 2021-11-09 NOTE — PROGRESS NOTES
Progress Note    Patient Name: Bhavana Castellanos  Date: 11/9/21         Service Type: Individual      Session Start Time: 1600  Session End Time: 1652     Session Length: 38    Session #: 22    Attendees: Client attended alone    Service Modality:  Video Visit:      Provider verified identity through the following two step process.  Patient provided:  Patient is known previously to provider    Telemedicine Visit: The patient's condition can be safely assessed and treated via synchronous audio and visual telemedicine encounter.      Reason for Telemedicine Visit: Patient has requested telehealth visit    Originating Site (Patient Location): Patient's home    Distant Site (Provider Location): Provider Remote Setting- Home Office    Consent:  The patient/guardian has verbally consented to: the potential risks and benefits of telemedicine (video visit) versus in person care; bill my insurance or make self-payment for services provided; and responsibility for payment of non-covered services.     Patient would like the video invitation sent by:  Send to e-mail at: [email or web address]    Mode of Communication:  Video Conference via [employer]    As the provider I attest to compliance with applicable laws and regulations related to telemedicine.     Treatment Plan Last Reviewed: 10/21/21  PHQ-9 / GILLES-7 :   PHQ 1/25/2021 6/18/2021 7/19/2021   PHQ-9 Total Score 2 12 9   Q9: Thoughts of better off dead/self-harm past 2 weeks Not at all Not at all Not at all     GILLES-7 SCORE 1/25/2021 6/18/2021 7/13/2021   Total Score - - -   Total Score - - 7 (mild anxiety)   Total Score 5 11 7       DATA  Interactive Complexity: No  Crisis: No       Progress Since Last Session (Related to Symptoms / Goals / Homework):   Symptoms: Improving, struggles with procrastination particularly when the task is not exciting or challenging.  Mood stable    Homework: Achieved / completed to  satisfaction      Episode of Care Goals: Achieved / completed to satisfaction - MAINTENANCE (Working to maintain change, with risk of relapse); Intervened by continuing to positively reinforce healthy behavior choice         Current / Ongoing Stressors and Concerns:  Patient concerned regarding plan going forward with respect to treatment of ADHD symptoms and the impact that has on mood stability.  Patient indicated that although, it was traumatic at the time her cousin was involved in the [place] shooting she does not believe that that particular event has been the main contributor to her longstanding ongoing issues.     Treatment Objective(s) Addressed in This Session:   Discussed during the session full strategies and approaches she is attempting to use in regard to retaining some level of organization during her workday.  She is setting timers and using check sheets on rewarding herself for completion of work.  Patient indicated that she is doing okay and trying to help more balance in her life.                 Intervention:   Solution focused, CBT and ADHD education                ASSESSMENT: Current Emotional / Mental Status (status of significant symptoms):              Risk status (Self / Other harm or suicidal ideation)              Patient denies current fears or concerns for personal safety.              Patient denies current or recent suicidal ideation or behaviors.              Patient denies current or recent homicidal ideation or behaviors.              Patient denies current or recent self injurious behavior or ideation.              Patient denies other safety concerns.              Patient reports there has been no change in risk factors since their last session.                Patient reports there has been no change in protective factors since their last session.                Recommended that patient call 911 or go to the local ED should there be a change in any of these risk  factors.                 Appearance:                            Appropriate               Eye Contact:                           Good               Psychomotor Behavior:          Normal               Attitude:                                   Cooperative               Orientation:                             All              Speech                          Rate / Production:       Normal/ Responsive Normal                           Volume:                       Normal               Mood:                                      Sad               Affect:                                      Appropriate               Thought Content:                    Clear               Thought Form:                        Coherent  Logical               Insight:                                     Good                      Medication Review:              No current psychiatric medications prescribed                 Medication Compliance:              N/A                 Changes in Health Issues:              Patient will discuss with PCP follow-up on vitamin D level                 Chemical Use Review:              Substance Use: Chemical use reviewed, no active concerns identified                  Tobacco Use: No current tobacco use.       Diagnosis:  1.  ADHD unspecified  2.  Moderate major depression (H)  3.  Anxiety (H)  4.  PTSD (H)     Collateral Reports Completed:              Routed note to PCP     PLAN: (Patient Tasks / Therapist Tasks / Other)   1.    Continue utilizing organizational tools and exercise regime  2.  Patient will be needing to find a new provider regarding consideration of ADHD medication as well as, follow-up on vitamin D level.  3.  Break larger tasks into small, manageable pieces.  4.  Organize workspace  5.  Continue being active and social  6.  Next appointment 3 weeks                                                               Genet Chairez  "LICSW  11/9/21  ______________________________________________________________________     Treatment Plan     Patient's Name: Bhavana Castellanos                        YOB: 1975     Date: 10/21/21     DSM5 Diagnoses: ADHD unspecified   Psychosocial / Contextual Factors: Currently working from home due to the pandemic     Referral / Collaboration:  Patient will be following up with psychologist regarding additional cognitive testing     Anticipated number of session or this episode of care: 25-30                    Goal 1: Patient will Reduce overall level, frequency and intensity of the anxiety so that daily  functioning is not impaired.    I will know I've met my goal when \" I am feeling more in control of things in my life.\"     Objective #A (Patient Action)                           1.   Patient will use daily planner 75%% of the time.  2.     Follow-up with PCP regarding discussion of possible medication  3.   Identify ways that ADHD causes difficulty in work and personal life  4    Identify self-care strategies in order to help manage symptoms conversation versus expectation and relationships.              5.   anagement of relationships well ensuring self-care.  Status: New - Date(s): 10/21/21   Intervention(s)  Therapist will continue to provide education regarding the impact that stress can have on the management of ADHD symptomology     Goal 2: Patient will establish the ability to effectively channel impulses.    I will know I've met my goal when \"I am not feeling as stressed.\"      Objective # A (Patient Action)    1. Develop compensatory strategies               2. Explore barriers to use of compensatory strategies               3. ADHD education               4. Explore factors which may exacerbate cognitive difficulties               5. Identify ways that ADHD causes difficulty in personal and work life  Status New - Date(s):10/21/21                 Intervention(s)               Therapist " will continue exploring positive elements of ADHD and the correlation between anxiety and depression with mood and  the impact that has on the management of ADHD symptoms.        Patient has agreed to the above plan        Genet CRAWFORD    October 23, 2021                                                      ______________________________________________________________________

## 2021-11-29 ENCOUNTER — VIRTUAL VISIT (OUTPATIENT)
Dept: BEHAVIORAL HEALTH | Facility: CLINIC | Age: 46
End: 2021-11-29
Payer: COMMERCIAL

## 2021-11-29 DIAGNOSIS — F90.9 ATTENTION DEFICIT HYPERACTIVITY DISORDER (ADHD), UNSPECIFIED ADHD TYPE: Primary | ICD-10-CM

## 2021-11-29 PROCEDURE — 90834 PSYTX W PT 45 MINUTES: CPT | Mod: GT | Performed by: SOCIAL WORKER

## 2021-11-29 NOTE — PROGRESS NOTES
Progress Note    Patient Name: Bhavana Castellanos  Date: 11/29/21         Service Type: Individual      Session Start Time: 1600  Session End Time: 1652     Session Length: 52    Session #: 23    Attendees: Client attended alone    Service Modality:  Video Visit:      Provider verified identity through the following two step process.  Patient provided:  Patient is known previously to provider    Telemedicine Visit: The patient's condition can be safely assessed and treated via synchronous audio and visual telemedicine encounter.      Reason for Telemedicine Visit: Patient has requested telehealth visit    Originating Site (Patient Location): Patient's home    Distant Site (Provider Location): Provider Remote Setting- Home Office    Consent:  The patient/guardian has verbally consented to: the potential risks and benefits of telemedicine (video visit) versus in person care; bill my insurance or make self-payment for services provided; and responsibility for payment of non-covered services.     Patient would like the video invitation sent by:  Send to e-mail at: [email or web address]    Mode of Communication:  Video Conference via Amwell    As the provider I attest to compliance with applicable laws and regulations related to telemedicine.     Treatment Plan Last Reviewed:   PHQ-9 / GILLES-7 :   PHQ 1/25/2021 6/18/2021 7/19/2021   PHQ-9 Total Score 2 12 9   Q9: Thoughts of better off dead/self-harm past 2 weeks Not at all Not at all Not at all     GILLES-7 SCORE 1/25/2021 6/18/2021 7/13/2021   Total Score - - -   Total Score - - 7 (mild anxiety)   Total Score 5 11 7         DATA  Interactive Complexity: No  Crisis: No       Progress Since Last Session (Related to Symptoms / Goals / Homework):   Symptoms: Improving, struggles with procrastination particularly when the task is not exciting or challenging.  Mood stable    Homework: Partially completed Has not yet initiated finding  a new PCP (because of being out of town for the holiday) but plans on doing that in the near future.      Episode of Care Goals: Achieved / completed to satisfaction - MAINTENANCE (Working to maintain change, with risk of relapse); Intervened by continuing to positively reinforce healthy behavior choice      Current / Ongoing Stressors and Concerns:   Patient concerned regarding plan going forward with respect to treatment of ADHD symptoms and the impact that has on mood stability. Continues to struggle with trying to turn her mind off at night in order to go to sleep better reasonable time.     Treatment Objective(s) Addressed in This Session:     Discussed during the session full strategies and approaches she is attempting to use in regard to retaining some level of organization during her workday.  She is setting timers and using check sheets on rewarding herself for completion of work.  Patient indicated that she is doing okay and trying to help more balance in her life. Once she has a new PCP she plans on discussing the possibility of medication to treat ADHD symptoms                 Intervention:   Solution focused, CBT and ADHD education        ASSESSMENT: Current Emotional / Mental Status (status of significant symptoms):   Risk status (Self / Other harm or suicidal ideation)              Patient denies current fears or concerns for personal safety.              Patient denies current or recent suicidal ideation or behaviors.              Patient denies current or recent homicidal ideation or behaviors.              Patient denies current or recent self injurious behavior or ideation.              Patient denies other safety concerns.              Patient reports there has been no change in risk factors since their last session.                Patient reports there has been no change in protective factors since their last session.                Recommended that patient call 911 or go to the local ED should  there be a change in any of these risk factors.     Appearance:   Appropriate    Eye Contact:   Good    Psychomotor Behavior: Normal    Attitude:   Cooperative    Orientation:   All   Speech    Rate / Production: Normal     Volume:  Normal    Mood:    Normal   Affect:    Appropriate    Thought Content:  Clear    Thought Form:  Coherent  Logical    Insight:    Good      Medication Review:   No current psychiatric medications prescribed     Medication Compliance:   NA     Changes in Health Issues:   None reported     Chemical Use Review:   Substance Use: Chemical use reviewed, no active concerns identified      Tobacco Use: No current tobacco use.      Diagnosis:  1.  ADHD unspecified  2.  Moderate major depression (H)  3.  Anxiety (H)  4.  PTSD (H)      Collateral Reports Completed:        Routed note to PCP    PLAN: (Patient Tasks / Therapist Tasks / Other)  1.  Continue utilizing organizational tools and exercise regime  2.  Patient will be needing to find a new provider regarding consideration of ADHD medication as well as, follow-up      on vitamin D level.  3.  Break larger tasks into small, manageable pieces.  4.  Organize workspace  5.  Continue being active and social  6.  Next appointment in January unless otherwise indicated      Genet Chairez [facility]  11/29/21                                                           ______________________________________________________________________    Treatment Plan     Patient's Name: Bhavana Castellanos                        YOB: 1975     Date: 10/21/21     DSM5 Diagnoses: ADHD unspecified   Psychosocial / Contextual Factors: Currently working from home due to the pandemic     Referral / Collaboration:  Patient will be following up with psychologist regarding additional cognitive testing     Anticipated number of session or this episode of care: 25-30                    Goal 1: Patient will Reduce overall level, frequency and intensity of the anxiety so  "that daily  functioning is not impaired.    I will know I've met my goal when \" I am feeling more in control of things in my life.\"     Objective #A (Patient Action)                           1.   Patient will use daily planner 75%% of the time.  2.     Follow-up with PCP regarding discussion of possible medication  3.   Identify ways that ADHD causes difficulty in work and personal life  4    Identify self-care strategies in order to help manage symptoms conversation versus expectation and relationships.              5.   anagement of relationships well ensuring self-care.  Status: New - Date(s): 10/21/21   Intervention(s)  Therapist will continue to provide education regarding the impact that stress can have on the management of ADHD symptomology     Goal 2: Patient will establish the ability to effectively channel impulses.    I will know I've met my goal when \"I am not feeling as stressed.\"      Objective # A (Patient Action)    1. Develop compensatory strategies               2. Explore barriers to use of compensatory strategies               3. ADHD education               4. Explore factors which may exacerbate cognitive difficulties               5. Identify ways that ADHD causes difficulty in personal and work life  Status New - Date(s):10/21/21                 Intervention(s)               Therapist will continue exploring positive elements of ADHD and the correlation between anxiety and depression with mood and  the impact that has on the management of ADHD symptoms.        Patient has agreed to the above plan        Genet CRAWFORDSW    October 23, 2021                                                                                  ______________________________________________________________________              "

## 2022-01-10 ENCOUNTER — VIRTUAL VISIT (OUTPATIENT)
Dept: BEHAVIORAL HEALTH | Facility: CLINIC | Age: 47
End: 2022-01-10
Payer: COMMERCIAL

## 2022-01-10 DIAGNOSIS — F90.9 ATTENTION DEFICIT HYPERACTIVITY DISORDER (ADHD), UNSPECIFIED ADHD TYPE: Primary | ICD-10-CM

## 2022-01-10 PROCEDURE — 90834 PSYTX W PT 45 MINUTES: CPT | Mod: GT | Performed by: SOCIAL WORKER

## 2022-01-10 ASSESSMENT — ANXIETY QUESTIONNAIRES
7. FEELING AFRAID AS IF SOMETHING AWFUL MIGHT HAPPEN: MORE THAN HALF THE DAYS
GAD7 TOTAL SCORE: 13
5. BEING SO RESTLESS THAT IT IS HARD TO SIT STILL: MORE THAN HALF THE DAYS
4. TROUBLE RELAXING: MORE THAN HALF THE DAYS
6. BECOMING EASILY ANNOYED OR IRRITABLE: SEVERAL DAYS
7. FEELING AFRAID AS IF SOMETHING AWFUL MIGHT HAPPEN: MORE THAN HALF THE DAYS
GAD7 TOTAL SCORE: 13
GAD7 TOTAL SCORE: 13
2. NOT BEING ABLE TO STOP OR CONTROL WORRYING: MORE THAN HALF THE DAYS
3. WORRYING TOO MUCH ABOUT DIFFERENT THINGS: MORE THAN HALF THE DAYS
1. FEELING NERVOUS, ANXIOUS, OR ON EDGE: MORE THAN HALF THE DAYS

## 2022-01-10 ASSESSMENT — PATIENT HEALTH QUESTIONNAIRE - PHQ9
SUM OF ALL RESPONSES TO PHQ QUESTIONS 1-9: 9
10. IF YOU CHECKED OFF ANY PROBLEMS, HOW DIFFICULT HAVE THESE PROBLEMS MADE IT FOR YOU TO DO YOUR WORK, TAKE CARE OF THINGS AT HOME, OR GET ALONG WITH OTHER PEOPLE: NOT DIFFICULT AT ALL
SUM OF ALL RESPONSES TO PHQ QUESTIONS 1-9: 9

## 2022-01-10 NOTE — PROGRESS NOTES
Progress Note    Patient Name: Bhavana Castellanos  Date: 1/10/22         Service Type: Individual      Session Start Time: 1600  Session End Time: 1652     Session Length: 52    Session #: 24    Attendees: Client attended alone    Service Modality:  Video Visit:      Provider verified identity through the following two step process.  Patient provided:  Patient photo    Telemedicine Visit: The patient's condition can be safely assessed and treated via synchronous audio and visual telemedicine encounter.      Reason for Telemedicine Visit: Patient has requested telehealth visit    Originating Site (Patient Location): Patient's home    Distant Site (Provider Location): Provider Remote Setting- Home Office    Consent:  The patient/guardian has verbally consented to: the potential risks and benefits of telemedicine (video visit) versus in person care; bill my insurance or make self-payment for services provided; and responsibility for payment of non-covered services.     Patient would like the video invitation sent by:  Send to e-mail at: [email or web address]    Mode of Communication:  Video Conference via Amwell    As the provider I attest to compliance with applicable laws and regulations related to telemedicine.     Treatment Plan Last Reviewed: 10/21/21  PHQ-9 / GILLES-7 :   PHQ 6/18/2021 7/19/2021 1/10/2022   PHQ-9 Total Score 12 9 9   Q9: Thoughts of better off dead/self-harm past 2 weeks Not at all Not at all Not at all     GILLES-7 SCORE 6/18/2021 7/13/2021 1/10/2022   Total Score - - -   Total Score - 7 (mild anxiety) 13 (moderate anxiety)   Total Score 11 7 13       DATA  Interactive Complexity: No  Crisis: No       Progress Since Last Session (Related to Symptoms / Goals / Homework):   Symptoms: The same.     Homework: Did not complete      Episode of Care Goals: Minimal progress - CONTEMPLATION (Considering change and yet undecided); Intervened by assessing the  negative and positive thinking (ambivalence) about behavior change     Current / Ongoing Stressors and Concerns:    End of year work stress. But managing OK.      Treatment Objective(s) Addressed in This Session:   Patient indicated that she is feeling as though she is doing okay and at this time is not wanting to explore possible medication for ADHD unspecified.  Alannah during the session the possibility of a timeline in order to revisit past events that may be underlying possible depression and/or anxiety.                  Intervention:    Solution focused, CBT and ADHD education        ASSESSMENT: Current Emotional / Mental Status (status of significant symptoms):       Risk status (Self / Other harm or suicidal ideation)              Patient denies current fears or concerns for personal safety.              Patient denies current or recent suicidal ideation or behaviors.              Patient denies current or recent homicidal ideation or behaviors.              Patient denies current or recent self injurious behavior or ideation.              Patient denies other safety concerns.              Patient reports there has been no change in risk factors since their last session.                Patient reports there has been no change in protective factors since their last session.                Recommended that patient call 911 or go to the local ED should there be a change in any of these risk factors.                 Appearance:                            Appropriate               Eye Contact:                           Good               Psychomotor Behavior:          Normal               Attitude:                                   Cooperative               Orientation:                             All              Speech                     Rate / Production:       Normal                           Volume:                       Normal               Mood:                                      Normal               "Affect:                                      Appropriate               Thought Content:                    Clear               Thought Form:                        Coherent  Logical               Insight:                                     Good                  Medication Review:   No current psychiatric medications prescribed     Medication Compliance:   NA     Changes in Health Issues:   Still looking for a primary Care provider. Will be checking on her Vit. D level.      Chemical Use Review:   Substance Use: Chemical use reviewed, no active concerns identified      Tobacco Use: No current tobacco use.      Diagnosis:  1.  ADHD unspecified  2.  Moderate major depression (H)  3.  Anxiety (H)  4.  PTSD (H)    Collateral Reports Completed:   Routed note to PCP    PLAN: (Patient Tasks / Therapist Tasks / Other)    1.  Continue utilizing organizational tools and exercise regime  2.  Patient will be needing to find a new PCP follow-up on vitamin D level.  3.  Break larger tasks into small, manageable pieces.  4.  Organize workspace  5.  Continue being active and social  6.  Pt. Will schedule appt. If need arises        Genet Chairez LICSW  1/10/22                                                             ______________________________________________________________________    Treatment Plan     Patient's Name: Bhavana Castellanos                        YOB: 1975     Date: 10/21/21     DSM5 Diagnoses: ADHD unspecified   Psychosocial / Contextual Factors: Currently working from home due to the pandemic     Referral / Collaboration:  Patient will be following up with psychologist regarding additional cognitive testing     Anticipated number of session or this episode of care: 25-30                    Goal 1: Patient will Reduce overall level, frequency and intensity of the anxiety so that daily  functioning is not impaired.    I will know I've met my goal when \" I am feeling more in control of things in my " "life.\"     Objective #A (Patient Action)                           1.   Patient will use daily planner 75%% of the time.  2.     Follow-up with PCP regarding discussion of possible medication  3.   Identify ways that ADHD causes difficulty in work and personal life  4    Identify self-care strategies in order to help manage symptoms conversation versus expectation and relationships.              5.   anagement of relationships well ensuring self-care.  Status: New - Date(s): 10/21/21   Intervention(s)  Therapist will continue to provide education regarding the impact that stress can have on the management of ADHD symptomology     Goal 2: Patient will establish the ability to effectively channel impulses.    I will know I've met my goal when \"I am not feeling as stressed.\"      Objective # A (Patient Action)    1. Develop compensatory strategies               2. Explore barriers to use of compensatory strategies               3. ADHD education               4. Explore factors which may exacerbate cognitive difficulties               5. Identify ways that ADHD causes difficulty in personal and work life  Status New - Date(s):10/21/21                 Intervention(s)               Therapist will continue exploring positive elements of ADHD and the correlation between anxiety and depression with mood and  the impact that has on the management of ADHD symptoms.        Patient has agreed to the above plan        Genet CRAWFORDSW    October 23, 2021                                                          "

## 2022-01-11 ASSESSMENT — ANXIETY QUESTIONNAIRES: GAD7 TOTAL SCORE: 13

## 2022-01-11 ASSESSMENT — PATIENT HEALTH QUESTIONNAIRE - PHQ9: SUM OF ALL RESPONSES TO PHQ QUESTIONS 1-9: 9

## 2022-06-20 ASSESSMENT — ENCOUNTER SYMPTOMS
COUGH: 0
BREAST MASS: 0
EYE PAIN: 0
ABDOMINAL PAIN: 0
DIZZINESS: 0
JOINT SWELLING: 0
DYSURIA: 0
SORE THROAT: 0
FREQUENCY: 0
PARESTHESIAS: 0
HEMATOCHEZIA: 0
HEADACHES: 0
CHILLS: 0
PALPITATIONS: 0
HEARTBURN: 0
SHORTNESS OF BREATH: 0
WEAKNESS: 0
NAUSEA: 0
NERVOUS/ANXIOUS: 0
FEVER: 0
ARTHRALGIAS: 1
DIARRHEA: 0
CONSTIPATION: 0
HEMATURIA: 0
MYALGIAS: 0

## 2022-06-20 ASSESSMENT — PATIENT HEALTH QUESTIONNAIRE - PHQ9
SUM OF ALL RESPONSES TO PHQ QUESTIONS 1-9: 6
10. IF YOU CHECKED OFF ANY PROBLEMS, HOW DIFFICULT HAVE THESE PROBLEMS MADE IT FOR YOU TO DO YOUR WORK, TAKE CARE OF THINGS AT HOME, OR GET ALONG WITH OTHER PEOPLE: NOT DIFFICULT AT ALL
SUM OF ALL RESPONSES TO PHQ QUESTIONS 1-9: 6

## 2022-06-21 ENCOUNTER — OFFICE VISIT (OUTPATIENT)
Dept: FAMILY MEDICINE | Facility: CLINIC | Age: 47
End: 2022-06-21
Payer: COMMERCIAL

## 2022-06-21 VITALS
HEART RATE: 82 BPM | SYSTOLIC BLOOD PRESSURE: 114 MMHG | HEIGHT: 63 IN | DIASTOLIC BLOOD PRESSURE: 80 MMHG | WEIGHT: 200.9 LBS | TEMPERATURE: 98.2 F | OXYGEN SATURATION: 98 % | BODY MASS INDEX: 35.6 KG/M2

## 2022-06-21 DIAGNOSIS — Z13.220 LIPID SCREENING: ICD-10-CM

## 2022-06-21 DIAGNOSIS — E03.9 ACQUIRED HYPOTHYROIDISM: ICD-10-CM

## 2022-06-21 DIAGNOSIS — E66.812 CLASS 2 OBESITY DUE TO EXCESS CALORIES WITHOUT SERIOUS COMORBIDITY WITH BODY MASS INDEX (BMI) OF 35.0 TO 35.9 IN ADULT: ICD-10-CM

## 2022-06-21 DIAGNOSIS — Z12.4 CERVICAL CANCER SCREENING: ICD-10-CM

## 2022-06-21 DIAGNOSIS — F33.0 MILD RECURRENT MAJOR DEPRESSION (H): ICD-10-CM

## 2022-06-21 DIAGNOSIS — Z12.11 COLON CANCER SCREENING: ICD-10-CM

## 2022-06-21 DIAGNOSIS — Z00.00 ENCOUNTER FOR ROUTINE HISTORY AND PHYSICAL EXAM IN FEMALE: Primary | ICD-10-CM

## 2022-06-21 DIAGNOSIS — Z79.899 MEDICATION MANAGEMENT: ICD-10-CM

## 2022-06-21 DIAGNOSIS — E66.09 CLASS 2 OBESITY DUE TO EXCESS CALORIES WITHOUT SERIOUS COMORBIDITY WITH BODY MASS INDEX (BMI) OF 35.0 TO 35.9 IN ADULT: ICD-10-CM

## 2022-06-21 LAB
ANION GAP SERPL CALCULATED.3IONS-SCNC: 11 MMOL/L (ref 5–18)
BUN SERPL-MCNC: 11 MG/DL (ref 8–22)
CALCIUM SERPL-MCNC: 9.6 MG/DL (ref 8.5–10.5)
CHLORIDE BLD-SCNC: 106 MMOL/L (ref 98–107)
CHOLEST SERPL-MCNC: 169 MG/DL
CO2 SERPL-SCNC: 24 MMOL/L (ref 22–31)
CREAT SERPL-MCNC: 0.86 MG/DL (ref 0.6–1.1)
FASTING STATUS PATIENT QL REPORTED: YES
GFR SERPL CREATININE-BSD FRML MDRD: 84 ML/MIN/1.73M2
GLUCOSE BLD-MCNC: 87 MG/DL (ref 70–125)
HDLC SERPL-MCNC: 39 MG/DL
HGB BLD-MCNC: 13.8 G/DL (ref 11.7–15.7)
LDLC SERPL CALC-MCNC: 106 MG/DL
POTASSIUM BLD-SCNC: 4.6 MMOL/L (ref 3.5–5)
SODIUM SERPL-SCNC: 141 MMOL/L (ref 136–145)
TRIGL SERPL-MCNC: 120 MG/DL
TSH SERPL DL<=0.005 MIU/L-ACNC: 0.48 UIU/ML (ref 0.3–5)

## 2022-06-21 PROCEDURE — 80061 LIPID PANEL: CPT | Performed by: NURSE PRACTITIONER

## 2022-06-21 PROCEDURE — 84443 ASSAY THYROID STIM HORMONE: CPT | Performed by: NURSE PRACTITIONER

## 2022-06-21 PROCEDURE — 80048 BASIC METABOLIC PNL TOTAL CA: CPT | Performed by: NURSE PRACTITIONER

## 2022-06-21 PROCEDURE — 99214 OFFICE O/P EST MOD 30 MIN: CPT | Mod: 25 | Performed by: NURSE PRACTITIONER

## 2022-06-21 PROCEDURE — 36415 COLL VENOUS BLD VENIPUNCTURE: CPT | Performed by: NURSE PRACTITIONER

## 2022-06-21 PROCEDURE — G0145 SCR C/V CYTO,THINLAYER,RESCR: HCPCS | Performed by: NURSE PRACTITIONER

## 2022-06-21 PROCEDURE — 99396 PREV VISIT EST AGE 40-64: CPT | Performed by: NURSE PRACTITIONER

## 2022-06-21 PROCEDURE — 85018 HEMOGLOBIN: CPT | Performed by: NURSE PRACTITIONER

## 2022-06-21 PROCEDURE — 87624 HPV HI-RISK TYP POOLED RSLT: CPT | Performed by: NURSE PRACTITIONER

## 2022-06-21 RX ORDER — LEVOTHYROXINE SODIUM 112 UG/1
112 TABLET ORAL DAILY
Qty: 90 TABLET | Refills: 3 | Status: SHIPPED | OUTPATIENT
Start: 2022-06-21 | End: 2022-06-21

## 2022-06-21 RX ORDER — LEVOTHYROXINE SODIUM 112 UG/1
112 TABLET ORAL DAILY
Qty: 90 TABLET | Refills: 3 | Status: SHIPPED | OUTPATIENT
Start: 2022-06-21 | End: 2023-07-13

## 2022-06-21 NOTE — PROGRESS NOTES
Assessment and Plan:    Encounter for routine history and physical exam in female  Recommend consuming a healthy diet and exercising.  She is up-to-date on vaccinations.  He has a mammogram scheduled.  - Hemoglobin  - Hemoglobin    Colon cancer screening  - COLOGUARD([employer])    Lipid screening  - Lipid panel reflex to direct LDL Fasting  - Lipid panel reflex to direct LDL Fasting    Cervical cancer screening  - Pap screen with HPV - recommended age 30 - 65 years    Acquired hypothyroidism  We will check thyroid cascade and adjust levothyroxine accordingly.  - TSH WITH FREE T4 REFLEX  - TSH WITH FREE T4 REFLEX  - levothyroxine (SYNTHROID/LEVOTHROID) 112 MCG tablet  Dispense: 90 tablet; Refill: 3    Mild recurrent major depression (H)  PHQ-9 score is 6.  She is not interested in treatment.    Class 2 obesity due to excess calories without serious comorbidity with body mass index (BMI) of 35.0 to 35.9 in adult  Recommend consuming a healthy diet and exercising.    Medication management  - Basic metabolic panel  (Ca, Cl, CO2, Creat, Gluc, K, Na, BUN)  - Basic metabolic panel  (Ca, Cl, CO2, Creat, Gluc, K, Na, BUN)      Subjective:     Bhavana is a 46 year old female presenting to the clinic for a female physical.     LMP: 5/24/22 irregular (60-90 day cycles)   Hx of abnormal pap smear: 6/18/21 ASCUS, positive HPV   Last pap smear: 6/18/21 ASCUS, positive HPV   Perform self-breast exams: yes  Vaginal discharge or irritation: none   Sexually active: not currently, single   Contraception: none   Concerns for STDs: none   Previous pregnancies:none     Patient has hypothyroidism which is controlled levothyroxine 112 mcg daily. She denies dry skin, hair loss, fatigue.  She has had some gradual weight gain.  She has a history of depression.  She was seeing a therapist, but feels as though her mood is stable.  She denies thoughts of suicide.  She is not interested in taking medication.    Review of systems:  I performed  a 10 point review of systems.  All pertinent positives and negatives are noted in the HPI. All others are negative.     Allergies   Allergen Reactions     Amoxicillin Rash       Current Outpatient Medications   Medication     levothyroxine (SYNTHROID/LEVOTHROID) 112 MCG tablet     MELATONIN-THEANINE PO     MULTIVITAMIN CHEW   OR     Omega-3 Fatty Acids (FISH OIL PO)     VITAMIN D 2000 UNIT OR TABS     MILK THISTLE PO     VITAMINS B1 B6 B12 PO     No current facility-administered medications for this visit.       Social History     Socioeconomic History     Marital status: Single     Spouse name: Not on file     Number of children: Not on file     Years of education: Not on file     Highest education level: Not on file   Occupational History     Occupation: [employer]     Employer: [employer]   Tobacco Use     Smoking status: Never Smoker     Smokeless tobacco: Never Used   Substance and Sexual Activity     Alcohol use: Yes     Comment: once per weeks      Drug use: Never     Sexual activity: Not Currently     Partners: Male     Birth control/protection: Condom   Other Topics Concern     Parent/sibling w/ CABG, MI or angioplasty before 65F 55M? No   Social History Narrative    Dairy/d 3-4 servings/d.     Caffeine 1-2 servings/d    Exercise 7 x week    Sunscreen used - Yes    Seatbelts used - Yes    Working smoke/CO detectors in the home - Yes    Guns stored in the home - No    Self Breast Exams - Yes    Self Testicular Exam - Yes    Eye Exam up to date - Yes    Dental Exam up to date - Yes    Pap Smear up to date - No    Mammogram up to date - No    PSA up to date - No    Dexa Scan up to date - No    Flex Sig / Colonoscopy up to date - No    Immunizations up to date - Yes TD 2007    Abuse: Current or Past(Physical, Sexual or Emotional)- No    Do you feel safe in your environment - Yes    ISRRAEL Saunders CMA    3/5/2010 updated                     Social Determinants of Health     Financial Resource  "Strain: Not on file   Food Insecurity: Not on file   Transportation Needs: Not on file   Physical Activity: Not on file   Stress: Not on file   Social Connections: Not on file   Intimate Partner Violence: Not on file   Housing Stability: Not on file       Past Medical History:   Diagnosis Date     Depressive disorder 1994     Hypothyroidism        Family History   Problem Relation Age of Onset     Cancer Father         kidney cancer: in remission     Other Cancer Father         Kidney     Depression Father      Other Cancer Maternal Grandmother         Bladder     Thyroid Disease Maternal Grandmother      Hypertension Maternal Grandfather      Diabetes Maternal Grandfather      Other Cancer Paternal Grandfather      Hypertension Brother        Past Surgical History:   Procedure Laterality Date     ENT SURGERY      wisdom teeth removal     EXCISE LESION HEAD N/A 7/17/2017    Procedure: EXCISE LESION HEAD;  (LOCAL) EXCISION OF RIGHT SCALP MASS AND LEFT POSTERIOR SCALP MASS x2;  Surgeon: Lori Acosta MD;  Location: [facility]     HEAD & NECK SURGERY  7/2018    cysts removed from scalp       Objective:     /80 (BP Location: Left arm, Cuff Size: Adult Regular)   Pulse 82   Temp 98.2  F (36.8  C) (Oral)   Ht 1.607 m (5' 3.25\")   Wt 91.1 kg (200 lb 14.4 oz)   LMP 05/24/2022   SpO2 98%   BMI 35.31 kg/m      Patient is alert, no obvious distress.   Skin: Warm, dry.  No rashes or lesions. Skin turgor rapid return.   HEENT:  Eyes normal.  Ears normal.  Nose patent, mucosa pink.  Oropharynx mucosa pink, no lesions or tonsil enlargement.   Neck:  Supple, without lymphadenopathy, bruits, JVD. Thyroid normal texture and size.    Lungs:  Clear to auscultation.  No wheezing, rales noted.  Respirations even and unlabored.   Heart:  Regular rate and rhythm.  No murmurs.   Breasts:  Normal.  No surrounding adenopathy.   Abdomen: Soft, nontender.  No organomegaly.  Bowel sounds normoactive.  No guarding or masses noted. "   :  External genitalia normal.  Normal vaginal mucosa.  Cervix no lesions or cervical motion tenderness.   Musculoskeletal:  Full ROM of extremities.  Muscle strength equal +5/5.   Neurological:  Cranial nerves 2-12 intact.             Answers for HPI/ROS submitted by the patient on 6/20/2022  If you checked off any problems, how difficult have these problems made it for you to do your work, take care of things at home, or get along with other people?: Not difficult at all  PHQ9 TOTAL SCORE: 6  Frequency of exercise:: 2-3 days/week  Getting at least 3 servings of Calcium per day:: Yes  Diet:: Regular (no restrictions)  Taking medications regularly:: Yes  Medication side effects:: None  Bi-annual eye exam:: Yes  Dental care twice a year:: Yes  Sleep apnea or symptoms of sleep apnea:: None  abdominal pain: No  Blood in stool: No  Blood in urine: No  chest pain: No  chills: No  congestion: No  constipation: No  cough: No  diarrhea: No  dizziness: No  ear pain: No  eye pain: No  nervous/anxious: No  fever: No  frequency: No  genital sores: No  headaches: No  hearing loss: No  heartburn: No  arthralgias: Yes  joint swelling: No  peripheral edema: No  mood changes: No  myalgias: No  nausea: No  dysuria: No  palpitations: No  Skin sensation changes: No  sore throat: No  urgency: No  rash: No  shortness of breath: No  visual disturbance: No  weakness: No  pelvic pain: No  vaginal bleeding: No  vaginal discharge: No  tenderness: No  breast mass: No  breast discharge: No  Additional concerns today:: Yes  Duration of exercise:: 15-30 minutes

## 2022-06-22 ENCOUNTER — HOSPITAL ENCOUNTER (OUTPATIENT)
Dept: MAMMOGRAPHY | Facility: CLINIC | Age: 47
Discharge: HOME | End: 2022-06-22
Attending: PSYCHOLOGIST | Admitting: PSYCHOLOGIST
Payer: COMMERCIAL

## 2022-06-22 DIAGNOSIS — Z12.31 VISIT FOR SCREENING MAMMOGRAM: ICD-10-CM

## 2022-06-22 PROCEDURE — 77067 SCR MAMMO BI INCL CAD: CPT

## 2022-06-24 LAB
BKR LAB AP GYN ADEQUACY: NORMAL
BKR LAB AP GYN INTERPRETATION: NORMAL
BKR LAB AP HPV REFLEX: NORMAL
BKR LAB AP LMP: NORMAL
BKR LAB AP PREVIOUS ABNL DX: NORMAL
BKR LAB AP PREVIOUS ABNORMAL: NORMAL
PATH REPORT.COMMENTS IMP SPEC: NORMAL
PATH REPORT.COMMENTS IMP SPEC: NORMAL
PATH REPORT.RELEVANT HX SPEC: NORMAL

## 2022-06-28 LAB
HUMAN PAPILLOMA VIRUS 16 DNA: NEGATIVE
HUMAN PAPILLOMA VIRUS 18 DNA: NEGATIVE
HUMAN PAPILLOMA VIRUS FINAL DIAGNOSIS: NORMAL
HUMAN PAPILLOMA VIRUS OTHER HR: NEGATIVE

## 2022-07-05 ENCOUNTER — HOSPITAL ENCOUNTER (OUTPATIENT)
Dept: MAMMOGRAPHY | Facility: CLINIC | Age: 47
Discharge: HOME | End: 2022-07-05
Attending: NURSE PRACTITIONER
Payer: COMMERCIAL

## 2022-07-05 DIAGNOSIS — N64.89 BREAST ASYMMETRY: ICD-10-CM

## 2022-07-05 PROCEDURE — 77061 BREAST TOMOSYNTHESIS UNI: CPT | Mod: RT

## 2022-07-05 PROCEDURE — 76642 ULTRASOUND BREAST LIMITED: CPT | Mod: RT

## 2022-07-11 LAB — NONINV COLON CA DNA+OCC BLD SCRN STL QL: NEGATIVE

## 2022-10-22 ENCOUNTER — HEALTH MAINTENANCE LETTER (OUTPATIENT)
Age: 47
End: 2022-10-22

## 2023-05-22 ENCOUNTER — PATIENT OUTREACH (OUTPATIENT)
Dept: CARE COORDINATION | Facility: CLINIC | Age: 48
End: 2023-05-22
Payer: COMMERCIAL

## 2023-06-05 ENCOUNTER — PATIENT OUTREACH (OUTPATIENT)
Dept: CARE COORDINATION | Facility: CLINIC | Age: 48
End: 2023-06-05
Payer: COMMERCIAL

## 2023-07-03 ENCOUNTER — PATIENT OUTREACH (OUTPATIENT)
Dept: CARE COORDINATION | Facility: CLINIC | Age: 48
End: 2023-07-03
Payer: COMMERCIAL

## 2023-07-12 DIAGNOSIS — E03.9 ACQUIRED HYPOTHYROIDISM: ICD-10-CM

## 2023-07-13 RX ORDER — LEVOTHYROXINE SODIUM 112 UG/1
TABLET ORAL
Qty: 90 TABLET | Refills: 0 | Status: SHIPPED | OUTPATIENT
Start: 2023-07-13 | End: 2023-07-18

## 2023-07-13 NOTE — TELEPHONE ENCOUNTER
"Routing refill request to provider for review/approval because:  Labs not current:  TSH  Patient needs to be seen because it has been more than 1 year since last office visit.    Last Written Prescription Date:  6/21/2022  Last Fill Quantity: 90,  # refills: 3   Last office visit provider:  6/21/2022     Requested Prescriptions   Pending Prescriptions Disp Refills     levothyroxine (SYNTHROID/LEVOTHROID) 112 MCG tablet [Pharmacy Med Name: L-THYROXINE (SYNTHROID) TABS 112MCG] 90 tablet 3     Sig: TAKE 1 TABLET DAILY       Thyroid Protocol Failed - 7/12/2023 11:44 PM        Failed - Recent (12 mo) or future (30 days) visit within the authorizing provider's specialty     Patient has had an office visit with the authorizing provider or a provider within the authorizing providers department within the previous 12 mos or has a future within next 30 days. See \"Patient Info\" tab in inbasket, or \"Choose Columns\" in Meds & Orders section of the refill encounter.              Failed - Normal TSH on file in past 12 months     Recent Labs   Lab Test 06/21/22  0935   TSH 0.48              Passed - Patient is 12 years or older        Passed - Medication is active on med list        Passed - No active pregnancy on record     If patient is pregnant or has had a positive pregnancy test, please check TSH.          Passed - No positive pregnancy test in past 12 months     If patient is pregnant or has had a positive pregnancy test, please check TSH.               Rica Lockwood RN 07/13/23 12:12 PM  "

## 2023-07-17 ASSESSMENT — ENCOUNTER SYMPTOMS
HEADACHES: 0
HEARTBURN: 0
DIARRHEA: 0
SORE THROAT: 0
COUGH: 1
PALPITATIONS: 0
CHILLS: 0
HEMATURIA: 0
ABDOMINAL PAIN: 0
FREQUENCY: 0
FEVER: 0
DYSURIA: 0
SHORTNESS OF BREATH: 0
PARESTHESIAS: 0
BREAST MASS: 0
NAUSEA: 0
WEAKNESS: 0
NERVOUS/ANXIOUS: 0
HEMATOCHEZIA: 0
DIZZINESS: 0
EYE PAIN: 0
ARTHRALGIAS: 0
JOINT SWELLING: 0
MYALGIAS: 0
CONSTIPATION: 0

## 2023-07-17 ASSESSMENT — PATIENT HEALTH QUESTIONNAIRE - PHQ9
SUM OF ALL RESPONSES TO PHQ QUESTIONS 1-9: 8
SUM OF ALL RESPONSES TO PHQ QUESTIONS 1-9: 8
10. IF YOU CHECKED OFF ANY PROBLEMS, HOW DIFFICULT HAVE THESE PROBLEMS MADE IT FOR YOU TO DO YOUR WORK, TAKE CARE OF THINGS AT HOME, OR GET ALONG WITH OTHER PEOPLE: NOT DIFFICULT AT ALL

## 2023-07-18 ENCOUNTER — OFFICE VISIT (OUTPATIENT)
Dept: FAMILY MEDICINE | Facility: CLINIC | Age: 48
End: 2023-07-18
Attending: NURSE PRACTITIONER
Payer: COMMERCIAL

## 2023-07-18 ENCOUNTER — HOSPITAL ENCOUNTER (OUTPATIENT)
Dept: MAMMOGRAPHY | Facility: CLINIC | Age: 48
Discharge: HOME | End: 2023-07-18
Attending: NURSE PRACTITIONER | Admitting: NURSE PRACTITIONER
Payer: COMMERCIAL

## 2023-07-18 VITALS
SYSTOLIC BLOOD PRESSURE: 112 MMHG | OXYGEN SATURATION: 98 % | DIASTOLIC BLOOD PRESSURE: 88 MMHG | TEMPERATURE: 98.1 F | HEIGHT: 63 IN | WEIGHT: 198.8 LBS | BODY MASS INDEX: 35.22 KG/M2 | HEART RATE: 75 BPM | RESPIRATION RATE: 12 BRPM

## 2023-07-18 DIAGNOSIS — Z79.899 MEDICATION MANAGEMENT: ICD-10-CM

## 2023-07-18 DIAGNOSIS — J45.20 MILD INTERMITTENT ASTHMA WITHOUT COMPLICATION: ICD-10-CM

## 2023-07-18 DIAGNOSIS — Z00.00 ENCOUNTER FOR ROUTINE HISTORY AND PHYSICAL EXAM IN FEMALE: Primary | ICD-10-CM

## 2023-07-18 DIAGNOSIS — Z13.220 LIPID SCREENING: ICD-10-CM

## 2023-07-18 DIAGNOSIS — E66.811 CLASS 1 OBESITY DUE TO EXCESS CALORIES WITHOUT SERIOUS COMORBIDITY WITH BODY MASS INDEX (BMI) OF 34.0 TO 34.9 IN ADULT: ICD-10-CM

## 2023-07-18 DIAGNOSIS — F33.0 MILD RECURRENT MAJOR DEPRESSION (H): ICD-10-CM

## 2023-07-18 DIAGNOSIS — E66.09 CLASS 1 OBESITY DUE TO EXCESS CALORIES WITHOUT SERIOUS COMORBIDITY WITH BODY MASS INDEX (BMI) OF 34.0 TO 34.9 IN ADULT: ICD-10-CM

## 2023-07-18 DIAGNOSIS — E03.9 ACQUIRED HYPOTHYROIDISM: ICD-10-CM

## 2023-07-18 DIAGNOSIS — Z12.31 VISIT FOR SCREENING MAMMOGRAM: ICD-10-CM

## 2023-07-18 LAB
ANION GAP SERPL CALCULATED.3IONS-SCNC: 10 MMOL/L (ref 7–15)
BUN SERPL-MCNC: 14.3 MG/DL (ref 6–20)
CALCIUM SERPL-MCNC: 9 MG/DL (ref 8.6–10)
CHLORIDE SERPL-SCNC: 110 MMOL/L (ref 98–107)
CHOLEST SERPL-MCNC: 152 MG/DL
CREAT SERPL-MCNC: 0.85 MG/DL (ref 0.51–0.95)
DEPRECATED HCO3 PLAS-SCNC: 22 MMOL/L (ref 22–29)
GFR SERPL CREATININE-BSD FRML MDRD: 85 ML/MIN/1.73M2
GLUCOSE SERPL-MCNC: 91 MG/DL (ref 70–99)
HDLC SERPL-MCNC: 39 MG/DL
HGB BLD-MCNC: 13.2 G/DL (ref 11.7–15.7)
LDLC SERPL CALC-MCNC: 92 MG/DL
NONHDLC SERPL-MCNC: 113 MG/DL
POTASSIUM SERPL-SCNC: 4.4 MMOL/L (ref 3.4–5.3)
SODIUM SERPL-SCNC: 142 MMOL/L (ref 136–145)
TRIGL SERPL-MCNC: 107 MG/DL
TSH SERPL DL<=0.005 MIU/L-ACNC: 1.23 UIU/ML (ref 0.3–4.2)

## 2023-07-18 PROCEDURE — 85018 HEMOGLOBIN: CPT | Performed by: NURSE PRACTITIONER

## 2023-07-18 PROCEDURE — 84443 ASSAY THYROID STIM HORMONE: CPT | Performed by: NURSE PRACTITIONER

## 2023-07-18 PROCEDURE — 77067 SCR MAMMO BI INCL CAD: CPT

## 2023-07-18 PROCEDURE — 80048 BASIC METABOLIC PNL TOTAL CA: CPT | Performed by: NURSE PRACTITIONER

## 2023-07-18 PROCEDURE — 99214 OFFICE O/P EST MOD 30 MIN: CPT | Mod: 25 | Performed by: NURSE PRACTITIONER

## 2023-07-18 PROCEDURE — 36415 COLL VENOUS BLD VENIPUNCTURE: CPT | Performed by: NURSE PRACTITIONER

## 2023-07-18 PROCEDURE — 80061 LIPID PANEL: CPT | Performed by: NURSE PRACTITIONER

## 2023-07-18 PROCEDURE — 99396 PREV VISIT EST AGE 40-64: CPT | Performed by: NURSE PRACTITIONER

## 2023-07-18 RX ORDER — LEVOTHYROXINE SODIUM 112 UG/1
112 TABLET ORAL DAILY
Qty: 90 TABLET | Refills: 3 | Status: SHIPPED | OUTPATIENT
Start: 2023-07-18 | End: 2024-07-19

## 2023-07-18 RX ORDER — ALBUTEROL SULFATE 90 UG/1
2 AEROSOL, METERED RESPIRATORY (INHALATION) EVERY 4 HOURS PRN
Qty: 18 G | Refills: 1 | Status: SHIPPED | OUTPATIENT
Start: 2023-07-18

## 2023-07-18 ASSESSMENT — PAIN SCALES - GENERAL: PAINLEVEL: NO PAIN (0)

## 2023-07-18 ASSESSMENT — ASTHMA QUESTIONNAIRES: ACT_TOTALSCORE: 18

## 2023-07-18 NOTE — PROGRESS NOTES
Assessment and Plan:    Encounter for routine history and physical exam in female  Recommend consuming a healthy diet and exercising.  She declines hepatitis B vaccine she is up-to-date on vaccines otherwise.  She has a mammogram scheduled.  She is up-to-date on colon cancer and cervical cancer screening.  - Hemoglobin    Lipid screening  - Lipid panel reflex to direct LDL Fasting    Acquired hypothyroidism  We will check thyroid cascade and adjust levothyroxine accordingly.  - TSH WITH FREE T4 REFLEX  - levothyroxine (SYNTHROID/LEVOTHROID) 112 MCG tablet  Dispense: 90 tablet; Refill: 3    Mild recurrent major depression (H)  This is controlled without medication according to the patient.    Mild intermittent asthma without complication  Patient continues albuterol as needed.  - albuterol (PROAIR HFA/PROVENTIL HFA/VENTOLIN HFA) 108 (90 Base) MCG/ACT inhaler  Dispense: 18 g; Refill: 1    Class 1 obesity due to excess calories without serious comorbidity with body mass index (BMI) of 34.0 to 34.9 in adult  Recommend consuming a healthy diet and exercising.    Medication management  - Basic metabolic panel  (Ca, Cl, CO2, Creat, Gluc, K, Na, BUN)      Subjective:     Bhavana is a 47 year old female presenting to the clinic for a female physical.     LMP: 6/14/23 (60-90 day cycles)   Hx of abnormal pap smear: 6/18/21 ASCUS, positive HPV   Last pap smear: 6/18/21 ASCUS, positive HPV   Perform self-breast exams: yes  Vaginal discharge or irritation: none   Sexually active: not currently, single   Contraception: none   Concerns for STDs: none   Previous pregnancies:none     Patient has hypothyroidism which is controlled levothyroxine 112 mcg daily. She denies dry skin, hair loss.  She has had some mild fatigue. She has had some gradual weight gain.  She has a history of depression.    She feels as though her mood is stable without medication.  She denies thoughts of suicide.  She feels well supported.  Patient states she  has a history of asthma which was diagnosed in her 20s.  Due to poor air quality, she has noticed that she is coughing and wheezing more.  She typically uses an inhaler twice per year.  She requests refill today.     Review of systems:  I performed a 10 point review of systems.  All pertinent positives and negatives are noted in the HPI. All others are negative.     Allergies   Allergen Reactions     Amoxicillin Rash       Current Outpatient Medications   Medication     levothyroxine (SYNTHROID/LEVOTHROID) 112 MCG tablet     MULTIVITAMIN CHEW   OR     Omega-3 Fatty Acids (FISH OIL PO)     VITAMIN D 2000 UNIT OR TABS     No current facility-administered medications for this visit.       Social History     Socioeconomic History     Marital status: Single     Spouse name: Not on file     Number of children: Not on file     Years of education: Not on file     Highest education level: Not on file   Occupational History     Occupation: [employer]     Employer: [employer]   Tobacco Use     Smoking status: Never     Passive exposure: Never     Smokeless tobacco: Never   Vaping Use     Vaping Use: Never used   Substance and Sexual Activity     Alcohol use: Yes     Alcohol/week: 1.0 standard drink of alcohol     Types: 1 Standard drinks or equivalent per week     Drug use: Never     Sexual activity: Not Currently     Partners: Male     Birth control/protection: Condom   Other Topics Concern     Parent/sibling w/ CABG, MI or angioplasty before 65F 55M? No   Social History Narrative    Dairy/d 3-4 servings/d.     Caffeine 1-2 servings/d    Exercise 7 x week    Sunscreen used - Yes    Seatbelts used - Yes    Working smoke/CO detectors in the home - Yes    Guns stored in the home - No    Self Breast Exams - Yes    Self Testicular Exam - Yes    Eye Exam up to date - Yes    Dental Exam up to date - Yes    Pap Smear up to date - No    Mammogram up to date - No    PSA up to date - No    Dexa Scan up to date - No     "Flex Sig / Colonoscopy up to date - No    Immunizations up to date - Yes TD 2007    Abuse: Current or Past(Physical, Sexual or Emotional)- No    Do you feel safe in your environment - Yes    ISRRAEL Saunders [facility]    3/5/2010 updated                     Social Determinants of Health     Financial Resource Strain: Not on file   Food Insecurity: Not on file   Transportation Needs: Not on file   Physical Activity: Not on file   Stress: Not on file   Social Connections: Not on file   Intimate Partner Violence: Not on file   Housing Stability: Not on file       Past Medical History:   Diagnosis Date     Depressive disorder 1994     History of blood transfusion 1976    Shortly after I was born     Hypothyroidism        Family History   Problem Relation Age of Onset     Cancer Father         kidney cancer: in remission     Other Cancer Father         Kidney     Depression Father      Other Cancer Maternal Grandmother         Bladder     Thyroid Disease Maternal Grandmother      Hypertension Maternal Grandfather      Diabetes Maternal Grandfather      Thyroid Disease Paternal Grandmother      Other Cancer Paternal Grandfather         Stomach     Hypertension Brother        Past Surgical History:   Procedure Laterality Date     ENT SURGERY      wisdom teeth removal     EXCISE LESION HEAD N/A 7/17/2017    Procedure: EXCISE LESION HEAD;  (LOCAL) EXCISION OF RIGHT SCALP MASS AND LEFT POSTERIOR SCALP MASS x2;  Surgeon: Lori Acosta MD;  Location: [facility]     HEAD & NECK SURGERY  7/2018    cysts removed from scalp       Objective:     /88   Pulse 75   Temp 98.1  F (36.7  C)   Resp 12   Ht 1.608 m (5' 3.31\")   Wt 90.2 kg (198 lb 12.8 oz)   SpO2 98%   BMI 34.88 kg/m      Patient is alert, no obvious distress.   Skin: Warm, dry.  No rashes or lesions. Skin turgor rapid return.   HEENT:  Eyes normal.  Ears normal.  Nose patent, mucosa pink.  Oropharynx mucosa pink, no lesions or tonsil enlargement.   Neck:  Supple, without " lymphadenopathy, bruits, JVD. Thyroid normal texture and size.    Lungs:  Clear to auscultation.  No wheezing, rales noted.  Respirations even and unlabored.   Heart:  Regular rate and rhythm.  No murmurs.   Breasts:  Normal.  No surrounding adenopathy.   Abdomen: Soft, nontender.  No organomegaly.  Bowel sounds normoactive.  No guarding or masses noted.   :  deferred  Musculoskeletal:  Full ROM of extremities.  Muscle strength equal +5/5.   Neurological:  Cranial nerves 2-12 intact.             Answers for HPI/ROS submitted by the patient on 7/17/2023  If you checked off any problems, how difficult have these problems made it for you to do your work, take care of things at home, or get along with other people?: Not difficult at all  PHQ9 TOTAL SCORE: 8  Frequency of exercise:: 2-3 days/week  Getting at least 3 servings of Calcium per day:: Yes  Diet:: Regular (no restrictions)  Taking medications regularly:: Yes  Medication side effects:: None  Bi-annual eye exam:: Yes  Dental care twice a year:: Yes  Sleep apnea or symptoms of sleep apnea:: Daytime drowsiness  abdominal pain: No  Blood in stool: No  Blood in urine: No  chest pain: No  chills: No  congestion: No  constipation: No  cough: Yes  diarrhea: No  dizziness: No  ear pain: No  eye pain: No  nervous/anxious: No  fever: No  frequency: No  genital sores: No  headaches: No  hearing loss: No  heartburn: No  arthralgias: No  joint swelling: No  peripheral edema: No  mood changes: No  myalgias: No  nausea: No  dysuria: No  palpitations: No  Skin sensation changes: No  sore throat: No  urgency: No  rash: No  shortness of breath: No  visual disturbance: No  weakness: No  pelvic pain: No  vaginal bleeding: No  vaginal discharge: No  tenderness: No  breast mass: No  breast discharge: No  Additional concerns today:: Yes  Duration of exercise:: 15-30 minutes

## 2023-08-29 LAB
ALBUMIN SERPL-MCNC: 4.2 G/DL (ref 3.5–5)
ALP SERPL-CCNC: 84 U/L (ref 45–120)
ALT SERPL W P-5'-P-CCNC: 28 U/L (ref 0–45)
ANION GAP SERPL CALCULATED.3IONS-SCNC: 9 MMOL/L (ref 5–18)
AST SERPL W P-5'-P-CCNC: 18 U/L (ref 0–40)
BASOPHILS # BLD AUTO: 0.1 10E3/UL (ref 0–0.2)
BASOPHILS NFR BLD AUTO: 1 %
BILIRUB SERPL-MCNC: 0.8 MG/DL (ref 0–1)
BUN SERPL-MCNC: 8 MG/DL (ref 8–22)
CALCIUM SERPL-MCNC: 8.8 MG/DL (ref 8.5–10.5)
CHLORIDE BLD-SCNC: 105 MMOL/L (ref 98–107)
CO2 SERPL-SCNC: 24 MMOL/L (ref 22–31)
CREAT SERPL-MCNC: 0.81 MG/DL (ref 0.6–1.1)
EOSINOPHIL # BLD AUTO: 0.5 10E3/UL (ref 0–0.7)
EOSINOPHIL NFR BLD AUTO: 5 %
ERYTHROCYTE [DISTWIDTH] IN BLOOD BY AUTOMATED COUNT: 13.2 % (ref 10–15)
GFR SERPL CREATININE-BSD FRML MDRD: 90 ML/MIN/1.73M2
GLUCOSE BLD-MCNC: 93 MG/DL (ref 70–125)
HCT VFR BLD AUTO: 40.6 % (ref 35–47)
HGB BLD-MCNC: 13.8 G/DL (ref 11.7–15.7)
IMM GRANULOCYTES # BLD: 0 10E3/UL
IMM GRANULOCYTES NFR BLD: 0 %
LYMPHOCYTES # BLD AUTO: 3.1 10E3/UL (ref 0.8–5.3)
LYMPHOCYTES NFR BLD AUTO: 31 %
MCH RBC QN AUTO: 30.7 PG (ref 26.5–33)
MCHC RBC AUTO-ENTMCNC: 34 G/DL (ref 31.5–36.5)
MCV RBC AUTO: 90 FL (ref 78–100)
MONOCYTES # BLD AUTO: 0.6 10E3/UL (ref 0–1.3)
MONOCYTES NFR BLD AUTO: 6 %
NEUTROPHILS # BLD AUTO: 5.5 10E3/UL (ref 1.6–8.3)
NEUTROPHILS NFR BLD AUTO: 57 %
NRBC # BLD AUTO: 0 10E3/UL
NRBC BLD AUTO-RTO: 0 /100
PLATELET # BLD AUTO: 236 10E3/UL (ref 150–450)
POTASSIUM BLD-SCNC: 3.9 MMOL/L (ref 3.5–5)
PROT SERPL-MCNC: 7.3 G/DL (ref 6–8)
RBC # BLD AUTO: 4.5 10E6/UL (ref 3.8–5.2)
SODIUM SERPL-SCNC: 138 MMOL/L (ref 136–145)
WBC # BLD AUTO: 9.8 10E3/UL (ref 4–11)

## 2023-08-29 PROCEDURE — 85025 COMPLETE CBC W/AUTO DIFF WBC: CPT | Performed by: EMERGENCY MEDICINE

## 2023-08-29 PROCEDURE — 81001 URINALYSIS AUTO W/SCOPE: CPT | Performed by: STUDENT IN AN ORGANIZED HEALTH CARE EDUCATION/TRAINING PROGRAM

## 2023-08-29 PROCEDURE — 80053 COMPREHEN METABOLIC PANEL: CPT | Performed by: STUDENT IN AN ORGANIZED HEALTH CARE EDUCATION/TRAINING PROGRAM

## 2023-08-29 PROCEDURE — 36415 COLL VENOUS BLD VENIPUNCTURE: CPT | Performed by: EMERGENCY MEDICINE

## 2023-08-29 PROCEDURE — 99285 EMERGENCY DEPT VISIT HI MDM: CPT | Mod: 25

## 2023-08-29 PROCEDURE — 80053 COMPREHEN METABOLIC PANEL: CPT | Performed by: EMERGENCY MEDICINE

## 2023-08-29 PROCEDURE — 81001 URINALYSIS AUTO W/SCOPE: CPT | Performed by: EMERGENCY MEDICINE

## 2023-08-29 PROCEDURE — 85025 COMPLETE CBC W/AUTO DIFF WBC: CPT | Performed by: STUDENT IN AN ORGANIZED HEALTH CARE EDUCATION/TRAINING PROGRAM

## 2023-08-29 PROCEDURE — 84703 CHORIONIC GONADOTROPIN ASSAY: CPT | Performed by: STUDENT IN AN ORGANIZED HEALTH CARE EDUCATION/TRAINING PROGRAM

## 2023-08-30 ENCOUNTER — APPOINTMENT (OUTPATIENT)
Dept: CT IMAGING | Facility: CLINIC | Age: 48
End: 2023-08-30
Attending: STUDENT IN AN ORGANIZED HEALTH CARE EDUCATION/TRAINING PROGRAM
Payer: COMMERCIAL

## 2023-08-30 ENCOUNTER — HOSPITAL ENCOUNTER (EMERGENCY)
Facility: CLINIC | Age: 48
Discharge: HOME | End: 2023-08-30
Attending: STUDENT IN AN ORGANIZED HEALTH CARE EDUCATION/TRAINING PROGRAM | Admitting: STUDENT IN AN ORGANIZED HEALTH CARE EDUCATION/TRAINING PROGRAM
Payer: COMMERCIAL

## 2023-08-30 ENCOUNTER — APPOINTMENT (OUTPATIENT)
Dept: ULTRASOUND IMAGING | Facility: CLINIC | Age: 48
End: 2023-08-30
Attending: STUDENT IN AN ORGANIZED HEALTH CARE EDUCATION/TRAINING PROGRAM
Payer: COMMERCIAL

## 2023-08-30 VITALS
BODY MASS INDEX: 34.91 KG/M2 | HEART RATE: 79 BPM | RESPIRATION RATE: 16 BRPM | WEIGHT: 197 LBS | TEMPERATURE: 97.8 F | OXYGEN SATURATION: 95 % | HEIGHT: 63 IN | DIASTOLIC BLOOD PRESSURE: 78 MMHG | SYSTOLIC BLOOD PRESSURE: 126 MMHG

## 2023-08-30 DIAGNOSIS — R10.31 RIGHT LOWER QUADRANT ABDOMINAL PAIN: ICD-10-CM

## 2023-08-30 LAB
ALBUMIN UR-MCNC: NEGATIVE MG/DL
APPEARANCE UR: ABNORMAL
BACTERIA #/AREA URNS HPF: ABNORMAL /HPF
BILIRUB UR QL STRIP: NEGATIVE
COLOR UR AUTO: ABNORMAL
GLUCOSE UR STRIP-MCNC: NEGATIVE MG/DL
HCG SERPL QL: NEGATIVE
HGB UR QL STRIP: NEGATIVE
HOLD SPECIMEN: NORMAL
HOLD SPECIMEN: NORMAL
KETONES UR STRIP-MCNC: NEGATIVE MG/DL
LEUKOCYTE ESTERASE UR QL STRIP: NEGATIVE
NITRATE UR QL: NEGATIVE
PH UR STRIP: 6 [PH] (ref 5–7)
RBC URINE: 2 /HPF
SP GR UR STRIP: 1.01 (ref 1–1.03)
SQUAMOUS EPITHELIAL: 11 /HPF
UROBILINOGEN UR STRIP-MCNC: <2 MG/DL
WBC URINE: 3 /HPF

## 2023-08-30 PROCEDURE — 250N000011 HC RX IP 250 OP 636: Mod: JZ | Performed by: STUDENT IN AN ORGANIZED HEALTH CARE EDUCATION/TRAINING PROGRAM

## 2023-08-30 PROCEDURE — 74177 CT ABD & PELVIS W/CONTRAST: CPT

## 2023-08-30 PROCEDURE — 93976 VASCULAR STUDY: CPT | Mod: XU

## 2023-08-30 RX ORDER — IOPAMIDOL 755 MG/ML
90 INJECTION, SOLUTION INTRAVASCULAR ONCE
Status: COMPLETED | OUTPATIENT
Start: 2023-08-30 | End: 2023-08-30

## 2023-08-30 RX ADMIN — IOPAMIDOL 90 ML: 755 INJECTION, SOLUTION INTRAVENOUS at 03:48

## 2023-08-30 ASSESSMENT — ENCOUNTER SYMPTOMS
FEVER: 0
ABDOMINAL PAIN: 1
BLOOD IN STOOL: 0

## 2023-08-30 ASSESSMENT — ACTIVITIES OF DAILY LIVING (ADL)
ADLS_ACUITY_SCORE: 35
ADLS_ACUITY_SCORE: 35

## 2023-08-30 NOTE — ED TRIAGE NOTES
Pt c/o RLQ abdominal pain starting 2 weeks ago that felt like she pulled a muscle. 12 days ago with severe pain that returned mild/moderate cramping pain. Today pain fluctuating between mild and moderate burning pain. No dysuria. Walking improves pain. No known fevers. +mild nausea.      Triage Assessment       Row Name 08/29/23 [id number]       Triage Assessment (Adult)    Airway WDL WDL       Respiratory WDL    Respiratory WDL WDL       Skin Circulation/Temperature WDL    Skin Circulation/Temperature WDL WDL       Cardiac WDL    Cardiac WDL WDL       Peripheral/Neurovascular WDL    Peripheral Neurovascular WDL WDL       Cognitive/Neuro/Behavioral WDL    Cognitive/Neuro/Behavioral WDL WDL

## 2023-08-30 NOTE — ED PROVIDER NOTES
EMERGENCY DEPARTMENT ENCOUNTER      NAME: Bhavana Castellanos  AGE: 47 year old female  YOB: 1975  MRN: [id number]  EVALUATION DATE & TIME: 8/30/2023 12:54 AM    PCP: Dotty Zhang    ED PROVIDER: Elijah Alba MD      Chief Complaint   Patient presents with    Abdominal Pain         FINAL IMPRESSION:  1. Right lower quadrant abdominal pain          ED COURSE & MEDICAL DECISION MAKING:    Pertinent Labs & Imaging studies reviewed. (See chart for details)  47 year old female presents to the Emergency Department for evaluation of abdominal pain.  1:45 AM Introduced myself to the patient, obtained history of present illness, and performed initial physical exam at this time.   4:29 AM I reevaluated and updated the patient.      ED Course as of 08/30/23 0529   Wed Aug 30, 2023   0351 Pt is a 47yoF who presents to the ED w/ 2wks of intermittent RLQ / groin abd pain. Began while undergoing physical activity (fencing), but no identified provoking/palliating factors since. Ddx includes intermittent ovarian torsion, ovarian cyst, appendicitis, intermittent inguinal hernia, muscle strain. Plan for CT abd/pelvis and TVUS + UA and labs to evaluate.   0353 No anemia or leukocytosis. No UTI. No electrolyte abnormalities or kidney injury. No transaminitis. TVUS does not show signs of torsion, but do have echogenicity in the L ovary (not the side of her sxs). This is likely a dominant follicle, but adding on preg test while waiting for CT scan.   0423 Negative CT abd/pelvis   0434 Discussed again with the pt, regarding the TVUS reading of possible ectopic; and again she denied this possibility and rejected having a pregnancy test being done. I think this is reasonable as her period being 2wks ago and having a dominant follicle seen on TVUS today. Given return precautions and instructed to f/up w/ PCP.       Medical Decision Making    History:  Supplemental history from: Documented in chart, if applicable  External  Record(s) reviewed: Documented in chart, if applicable.    Work Up:  Chart documentation includes differential considered and any EKGs or imaging independently interpreted by provider, where specified.  In additional to work up documented, I considered the following work up: Documented in chart, if applicable.    External consultation:  Discussion of management with another provider: Documented in chart, if applicable    Complicating factors:  Care impacted by chronic illness: Mental Health  Care affected by social determinants of health: N/A    Disposition considerations: Discharge. No recommendations on prescription strength medication(s). See documentation for any additional details.        At the conclusion of the encounter I discussed the results of all of the tests and the disposition. The questions were answered. The patient or family acknowledged understanding and was agreeable with the care plan.     0 minutes of critical care time     MEDICATIONS GIVEN IN THE EMERGENCY:  Medications   iopamidol (ISOVUE-370) solution 90 mL (90 mLs Intravenous $Given 8/30/23 0348)       NEW PRESCRIPTIONS STARTED AT TODAY'S ER VISIT  Discharge Medication List as of 8/30/2023  4:38 AM             =================================================================    HPI    Patient information was obtained from: the patient    Use of : N/A         Bhavana Castellanos is a 47 year old female with a pertinent history of moderate major depression, fatty liver, and hypothyroidism who presents to this ED via private vehicle for evaluation of abdominal pain.    Patient presents to ED for evaluation of abdominal pain. 2 weeks ago the patient was at [facility] when she noticed a pain in her right groin area. The patient states that the next day while bending over she had a sudden sharp pain that radiates down her right leg. Her pain worsens with movement. The patient's pain level has decreased from an 8/10 to a 3/10. Her  pain has changed from a sharp pain to a cramping pain to a burning pain. Patient notes no chance of pregnancy and no history of abdominal surgeries. The patient does report a history of irregular periods. The patient denies bloody stool, vaginal bleeding, vaginal discharge, fevers, and any other symptoms at this time.    REVIEW OF SYSTEMS   Review of Systems   Constitutional:  Negative for fever.   Gastrointestinal:  Positive for abdominal pain. Negative for blood in stool.   Genitourinary:  Negative for vaginal bleeding and vaginal discharge.   All other systems reviewed and are negative.       PAST MEDICAL HISTORY:  Past Medical History:   Diagnosis Date    Depressive disorder 1994    History of blood transfusion 1976    Shortly after I was born    Hypothyroidism        PAST SURGICAL HISTORY:  Past Surgical History:   Procedure Laterality Date    ENT SURGERY      wisdom teeth removal    EXCISE LESION HEAD N/A 7/17/2017    Procedure: EXCISE LESION HEAD;  (LOCAL) EXCISION OF RIGHT SCALP MASS AND LEFT POSTERIOR SCALP MASS x2;  Surgeon: Lori Acosta MD;  Location: [facility]    HEAD & NECK SURGERY  7/2018    cysts removed from scalp           CURRENT MEDICATIONS:    albuterol (PROAIR HFA/PROVENTIL HFA/VENTOLIN HFA) 108 (90 Base) MCG/ACT inhaler  levothyroxine (SYNTHROID/LEVOTHROID) 112 MCG tablet  MULTIVITAMIN CHEW   OR  Omega-3 Fatty Acids (FISH OIL PO)  VITAMIN D 2000 UNIT OR TABS        ALLERGIES:  Allergies   Allergen Reactions    Amoxicillin Rash       FAMILY HISTORY:  Family History   Problem Relation Age of Onset    Cancer Father         kidney cancer: in remission    Other Cancer Father         Kidney    Depression Father     Other Cancer Maternal Grandmother         Bladder    Thyroid Disease Maternal Grandmother     Hypertension Maternal Grandfather     Diabetes Maternal Grandfather     Thyroid Disease Paternal Grandmother     Other Cancer Paternal Grandfather         Stomach    Hypertension Brother      "Diabetes Brother        SOCIAL HISTORY:   Social History     Socioeconomic History    Marital status: Single   Occupational History    Occupation: [employer]     Employer: [employer]   Tobacco Use    Smoking status: Never     Passive exposure: Never    Smokeless tobacco: Never   Vaping Use    Vaping Use: Never used   Substance and Sexual Activity    Alcohol use: Yes     Alcohol/week: 1.0 standard drink of alcohol     Types: 1 Standard drinks or equivalent per week    Drug use: Never    Sexual activity: Not Currently     Partners: Male     Birth control/protection: Condom   Other Topics Concern    Parent/sibling w/ CABG, MI or angioplasty before 65F 55M? No   Social History Narrative    Dairy/d 3-4 servings/d.     Caffeine 1-2 servings/d    Exercise 7 x week    Sunscreen used - Yes    Seatbelts used - Yes    Working smoke/CO detectors in the home - Yes    Guns stored in the home - No    Self Breast Exams - Yes    Self Testicular Exam - Yes    Eye Exam up to date - Yes    Dental Exam up to date - Yes    Pap Smear up to date - No    Mammogram up to date - No    PSA up to date - No    Dexa Scan up to date - No    Flex Sig / Colonoscopy up to date - No    Immunizations up to date - Yes TD 2007    Abuse: Current or Past(Physical, Sexual or Emotional)- No    Do you feel safe in your environment - Yes    ISRRAEL Saunders CMA    3/5/2010 updated                       VITALS:  /78   Pulse 79   Temp 97.8  F (36.6  C) (Oral)   Resp 16   Ht 1.6 m (5' 3\")   Wt 89.4 kg (197 lb)   LMP 08/14/2023 (Exact Date)   SpO2 95%   BMI 34.90 kg/m      PHYSICAL EXAM    Physical Exam  Vitals reviewed.   Constitutional:       General: She is not in acute distress.     Appearance: Normal appearance. She is not ill-appearing or diaphoretic.   HENT:      Head: Atraumatic.      Nose: Nose normal.      Mouth/Throat:      Mouth: Mucous membranes are moist.   Eyes:      General: No scleral icterus.     Conjunctiva/sclera: " Conjunctivae normal.   Cardiovascular:      Rate and Rhythm: Normal rate and regular rhythm.      Pulses: Normal pulses.      Heart sounds: Normal heart sounds.   Pulmonary:      Effort: No respiratory distress.      Breath sounds: Normal breath sounds.   Abdominal:      General: Abdomen is flat. There is no distension.      Palpations: Abdomen is soft.      Tenderness: There is abdominal tenderness (RLQ). There is no right CVA tenderness, left CVA tenderness, guarding or rebound.      Hernia: No hernia is present.   Musculoskeletal:         General: Normal range of motion.      Cervical back: Normal range of motion and neck supple.      Right lower leg: No edema.      Left lower leg: No edema.   Skin:     General: Skin is warm.      Capillary Refill: Capillary refill takes less than 2 seconds.      Findings: No rash.   Neurological:      General: No focal deficit present.      Mental Status: She is alert and oriented to person, place, and time. Mental status is at baseline.   Psychiatric:         Mood and Affect: Mood normal.         Behavior: Behavior normal.         Thought Content: Thought content normal.         Judgment: Judgment normal.           LAB:  All pertinent labs reviewed and interpreted.  Results for orders placed or performed during the hospital encounter of 08/30/23   CT Abdomen Pelvis w Contrast    Impression    IMPRESSION:   1.  No acute abnormalities or CT findings to explain the patient's symptoms.    2.  Mild diffuse fatty infiltration of the liver.    3.  Normal appendix. No acute bowel findings.    4.  See today's separate pelvic ultrasound report for those details.       US Pelvis Cmplt w Transvag & Doppler LmtPel Duplex Limited    Impression    IMPRESSION:    1.  2 cm complex hypoechoic focus at the left ovary with internal echogenic foci presumably reflecting a dominant follicle. An ectopic pregnancy would be far less likely though correlation could be made with urine pregnancy test if  clinically warranted.   Follow-up ultrasound may also be beneficial to demonstrate resolution.    2.  No evidence of ovarian torsion.    3.  Uterus and endometrium within normal limits.         Comprehensive metabolic panel   Result Value Ref Range    Sodium 138 136 - 145 mmol/L    Potassium 3.9 3.5 - 5.0 mmol/L    Chloride 105 98 - 107 mmol/L    Carbon Dioxide (CO2) 24 22 - 31 mmol/L    Anion Gap 9 5 - 18 mmol/L    Urea Nitrogen 8 8 - 22 mg/dL    Creatinine 0.81 0.60 - 1.10 mg/dL    Calcium 8.8 8.5 - 10.5 mg/dL    Glucose 93 70 - 125 mg/dL    Alkaline Phosphatase 84 45 - 120 U/L    AST 18 0 - 40 U/L    ALT 28 0 - 45 U/L    Protein Total 7.3 6.0 - 8.0 g/dL    Albumin 4.2 3.5 - 5.0 g/dL    Bilirubin Total 0.8 0.0 - 1.0 mg/dL    GFR Estimate 90 >60 mL/min/1.73m2   UA with Microscopic reflex to Culture    Specimen: Urine, Midstream   Result Value Ref Range    Color Urine Light Yellow Colorless, Straw, Light Yellow, Yellow    Appearance Urine Turbid (A) Clear    Glucose Urine Negative Negative mg/dL    Bilirubin Urine Negative Negative    Ketones Urine Negative Negative mg/dL    Specific Gravity Urine 1.007 1.001 - 1.030    Blood Urine Negative Negative    pH Urine 6.0 5.0 - 7.0    Protein Albumin Urine Negative Negative mg/dL    Urobilinogen Urine <2.0 <2.0 mg/dL    Nitrite Urine Negative Negative    Leukocyte Esterase Urine Negative Negative    Bacteria Urine Few (A) None Seen /HPF    RBC Urine 2 <=2 /HPF    WBC Urine 3 <=5 /HPF    Squamous Epithelials Urine 11 (H) <=1 /HPF   CBC with platelets and differential   Result Value Ref Range    WBC Count 9.8 4.0 - 11.0 10e3/uL    RBC Count 4.50 3.80 - 5.20 10e6/uL    Hemoglobin 13.8 11.7 - 15.7 g/dL    Hematocrit 40.6 35.0 - 47.0 %    MCV 90 78 - 100 fL    MCH 30.7 26.5 - 33.0 pg    MCHC 34.0 31.5 - 36.5 g/dL    RDW 13.2 10.0 - 15.0 %    Platelet Count 236 150 - 450 10e3/uL    % Neutrophils 57 %    % Lymphocytes 31 %    % Monocytes 6 %    % Eosinophils 5 %    % Basophils 1  %    % Immature Granulocytes 0 %    NRBCs per 100 WBC 0 <1 /100    Absolute Neutrophils 5.5 1.6 - 8.3 10e3/uL    Absolute Lymphocytes 3.1 0.8 - 5.3 10e3/uL    Absolute Monocytes 0.6 0.0 - 1.3 10e3/uL    Absolute Eosinophils 0.5 0.0 - 0.7 10e3/uL    Absolute Basophils 0.1 0.0 - 0.2 10e3/uL    Absolute Immature Granulocytes 0.0 <=0.4 10e3/uL    Absolute NRBCs 0.0 10e3/uL   Extra Blue Top Tube   Result Value Ref Range    Hold Specimen JIC    Extra Red Top Tube   Result Value Ref Range    Hold Specimen JIC    HCG qualitative Blood   Result Value Ref Range    hCG Serum Qualitative Negative Negative       RADIOLOGY:  Reviewed all pertinent imaging. Please see official radiology report.  US Pelvis Cmplt w Transvag & Doppler LmtPel Duplex Limited   Final Result   IMPRESSION:     1.  2 cm complex hypoechoic focus at the left ovary with internal echogenic foci presumably reflecting a dominant follicle. An ectopic pregnancy would be far less likely though correlation could be made with urine pregnancy test if clinically warranted.    Follow-up ultrasound may also be beneficial to demonstrate resolution.      2.  No evidence of ovarian torsion.      3.  Uterus and endometrium within normal limits.               CT Abdomen Pelvis w Contrast   Final Result   IMPRESSION:    1.  No acute abnormalities or CT findings to explain the patient's symptoms.      2.  Mild diffuse fatty infiltration of the liver.      3.  Normal appendix. No acute bowel findings.      4.  See today's separate pelvic ultrasound report for those details.                  PROCEDURES:   None.      [facility] [email or web address] System Documentation:   CMS Diagnoses:               I, Oscar Borja, am serving as a scribe to document services personally performed by Elijah Alba MD based on my observation and the provider's statements to me. I, Elijah Alba MD attest that Oscar Borja is acting in a scribe capacity, has observed my performance of the services and  has documented them in accordance with my direction.     Elijah Alba MD  [facility] EMERGENCY ROOM  [address] [facility] [address] [phone]       Elijah Alba MD  08/30/23 0519

## 2023-12-21 ENCOUNTER — HOSPITAL ENCOUNTER (EMERGENCY)
Facility: CLINIC | Age: 48
Discharge: HOME | End: 2023-12-21
Attending: STUDENT IN AN ORGANIZED HEALTH CARE EDUCATION/TRAINING PROGRAM | Admitting: STUDENT IN AN ORGANIZED HEALTH CARE EDUCATION/TRAINING PROGRAM
Payer: COMMERCIAL

## 2023-12-21 VITALS
HEIGHT: 62 IN | SYSTOLIC BLOOD PRESSURE: 140 MMHG | RESPIRATION RATE: 16 BRPM | DIASTOLIC BLOOD PRESSURE: 98 MMHG | BODY MASS INDEX: 35.88 KG/M2 | HEART RATE: 68 BPM | WEIGHT: 195 LBS | OXYGEN SATURATION: 100 % | TEMPERATURE: 99.5 F

## 2023-12-21 DIAGNOSIS — K08.89 TOOTH PAIN: ICD-10-CM

## 2023-12-21 DIAGNOSIS — L03.211 FACIAL CELLULITIS: ICD-10-CM

## 2023-12-21 PROCEDURE — 99284 EMERGENCY DEPT VISIT MOD MDM: CPT

## 2023-12-21 RX ORDER — CLINDAMYCIN HCL 300 MG
300 CAPSULE ORAL 4 TIMES DAILY
Qty: 40 CAPSULE | Refills: 0 | Status: SHIPPED | OUTPATIENT
Start: 2023-12-21 | End: 2023-12-31

## 2023-12-21 RX ORDER — OXYCODONE HYDROCHLORIDE 5 MG/1
5 TABLET ORAL EVERY 6 HOURS PRN
Qty: 12 TABLET | Refills: 0 | Status: SHIPPED | OUTPATIENT
Start: 2023-12-21 | End: 2023-12-24

## 2023-12-22 NOTE — DISCHARGE INSTRUCTIONS
Please return to the emergency department if you develop changes in your voice, difficulty swallowing, drooling, difficulty breathing, difficulty moving your neck, or worsening symptoms.

## 2023-12-22 NOTE — ED PROVIDER NOTES
EMERGENCY DEPARTMENT ENCOUNTER      NAME: Bhavana Castellanos  AGE: 48 year old female  YOB: 1975  MRN: [id number]  EVALUATION DATE & TIME: No admission date for patient encounter.    PCP: Dotty Zhang    ED PROVIDER: Dr. Elijah Alba      Chief Complaint   Patient presents with    Dental Pain    Facial Pain         FINAL IMPRESSION:  1. Facial cellulitis    2. Tooth pain          ED COURSE & MEDICAL DECISION MAKING:    Pertinent Labs & Imaging studies reviewed. (See chart for details)  48 year old female presents to the Emergency Department for evaluation of tooth pain    ED Course as of 12/21/23 1849   Thu Dec 21, 2023   1844 Patient is a 48-year-old female who presents to the emergency department with right cheek swelling and dental pain.  She was diagnosed with a tooth infection by a dentist earlier this week, and was not provided antibiotics or pain control beyond Tylenol and ibuprofen.  She states that today the pain has worsened, she is now developing swelling over her right cheek with a redness.  Denies difficulty swallowing, and exam does not show limitation in range of motion of the neck, and is not having fevers.  Examination does not show uvular deviation or peritonsillar abscess.  No swelling of the submandibular space.  No drooling.  No extraocular movement restriction or pain.  No palpable abscess of the gumline.  Suspect that this is extension of the infection into the facial tissues causing a facial cellulitis.  Will prescribe clindamycin, and a short course of oxycodone for pain.  Discussed return precautions and red flag symptoms to look out for in the interim while waiting for her dental appointment in 1 week.     6:28 PM I met with the patient, obtained history, performed an initial exam, and discussed options and plan for diagnostics and treatment here in the ED.      Medical Decision Making    History:  Supplemental history from: Friend  External Record(s) reviewed:  Documented in chart, if applicable.    Work Up:  Chart documentation includes differential considered and any EKGs or imaging independently interpreted by provider, where specified.  In additional to work up documented, I considered the following work up: Documented in chart, if applicable.    External consultation:  Discussion of management with another provider: Documented in chart, if applicable    Complicating factors:  Care impacted by chronic illness: Other: hypothyroidism, major depressive disorder, fatty liver  Care affected by social determinants of health: N/A    Disposition considerations: Discharge. I prescribed additional prescription strength medication(s) as charted. See documentation for any additional details.        At the conclusion of the encounter I discussed the results of all of the tests and the disposition. The questions were answered. The patient or family acknowledged understanding and was agreeable with the care plan.     0 minutes of critical care time     MEDICATIONS GIVEN IN THE EMERGENCY:  Medications - No data to display    NEW PRESCRIPTIONS STARTED AT TODAY'S ER VISIT  New Prescriptions    CLINDAMYCIN (CLEOCIN) 300 MG CAPSULE    Take 1 capsule (300 mg) by mouth 4 times daily for 10 days    OXYCODONE (ROXICODONE) 5 MG TABLET    Take 1 tablet (5 mg) by mouth every 6 hours as needed for breakthrough pain or severe pain          =================================================================    HPI    Patient information was obtained from: patient, patient's friend    Use of : N/A         Bhavana Castellanos is a 48 year old female with a pertinent history of hypothyroidism, fatty liver, moderate major depression who presents to this ED by walk in for evaluation of right sided facial swelling and dental pain.    The patient reports that she had pain in her right upper teeth on that started on Monday, and she noticed facial swelling that started today that prompted her to come  "to the ED. She has pain when opening her jaw and when she moves her neck to the left. Patient notes that she feels \"crummy\" and tired today. On Tuesday, she went to the dentist and she was found to have a dental abscess. She was not given any antibiotics, and her pain was not as severe as it currently is now. She has a root canal scheduled for next Thursday. Patient reports having a fever of 99.6. The patient took Ibuprofen last at 11 am today and Tylenol at 2:30 PM today.    The patient denies issues swallowing or any other complaints at this time.      PAST MEDICAL HISTORY:  Past Medical History:   Diagnosis Date    Depressive disorder 1994    History of blood transfusion 1975    Shortly after I was born    Hypothyroidism        PAST SURGICAL HISTORY:  Past Surgical History:   Procedure Laterality Date    ENT SURGERY      wisdom teeth removal    EXCISE LESION HEAD N/A 7/17/2017    Procedure: EXCISE LESION HEAD;  (LOCAL) EXCISION OF RIGHT SCALP MASS AND LEFT POSTERIOR SCALP MASS x2;  Surgeon: Lori Acosta MD;  Location: [facility]    HEAD & NECK SURGERY  7/2018    cysts removed from scalp           CURRENT MEDICATIONS:    clindamycin (CLEOCIN) 300 MG capsule  oxyCODONE (ROXICODONE) 5 MG tablet  albuterol (PROAIR HFA/PROVENTIL HFA/VENTOLIN HFA) 108 (90 Base) MCG/ACT inhaler  levothyroxine (SYNTHROID/LEVOTHROID) 112 MCG tablet  MULTIVITAMIN CHEW   OR  Omega-3 Fatty Acids (FISH OIL PO)  VITAMIN D 2000 UNIT OR TABS        ALLERGIES:  Allergies   Allergen Reactions    Amoxicillin Rash       FAMILY HISTORY:  Family History   Problem Relation Age of Onset    Cancer Father         kidney cancer: in remission    Other Cancer Father         Kidney    Depression Father     Other Cancer Maternal Grandmother         Bladder    Thyroid Disease Maternal Grandmother     Hypertension Maternal Grandfather     Diabetes Maternal Grandfather     Thyroid Disease Paternal Grandmother     Other Cancer Paternal Grandfather         Stomach " "   Hypertension Brother     Diabetes Brother        SOCIAL HISTORY:   Social History     Socioeconomic History    Marital status: Single   Occupational History    Occupation: [employer]     Employer: [employer]   Tobacco Use    Smoking status: Never     Passive exposure: Never    Smokeless tobacco: Never   Vaping Use    Vaping Use: Never used   Substance and Sexual Activity    Alcohol use: Yes     Alcohol/week: 1.0 standard drink of alcohol     Types: 1 Standard drinks or equivalent per week    Drug use: Never    Sexual activity: Not Currently     Partners: Male     Birth control/protection: Condom   Other Topics Concern    Parent/sibling w/ CABG, MI or angioplasty before 65F 55M? No   Social History Narrative    Dairy/d 3-4 servings/d.     Caffeine 1-2 servings/d    Exercise 7 x week    Sunscreen used - Yes    Seatbelts used - Yes    Working smoke/CO detectors in the home - Yes    Guns stored in the home - No    Self Breast Exams - Yes    Self Testicular Exam - Yes    Eye Exam up to date - Yes    Dental Exam up to date - Yes    Pap Smear up to date - No    Mammogram up to date - No    PSA up to date - No    Dexa Scan up to date - No    Flex Sig / Colonoscopy up to date - No    Immunizations up to date - Yes TD 2007    Abuse: Current or Past(Physical, Sexual or Emotional)- No    Do you feel safe in your environment - Yes    ISRRAEL Saunders CMA    3/5/2010 updated                       VITALS:  BP (!) 140/98   Pulse 68   Temp 99.5  F (37.5  C)   Resp 16   Ht 1.575 m (5' 2\")   Wt 88.5 kg (195 lb)   LMP 11/28/2023   SpO2 100%   BMI 35.67 kg/m      PHYSICAL EXAM    Physical Exam  Vitals and nursing note reviewed.   Constitutional:       General: She is not in acute distress.     Appearance: Normal appearance. She is normal weight. She is not ill-appearing.   HENT:      Head: Normocephalic and atraumatic.      Comments: Mild erythema with swelling over the right maxilla, not involving the lips or " nose.     Nose: Nose normal.      Mouth/Throat:      Mouth: Mucous membranes are moist.      Pharynx: Oropharynx is clear.      Comments: No visible or palpable area of fluctuance.  No purulence.  No uvular deviation.  Posterior oropharynx is symmetric.  No edema of the submandibular space.  Eyes:      Extraocular Movements: Extraocular movements intact.      Conjunctiva/sclera: Conjunctivae normal.      Pupils: Pupils are equal, round, and reactive to light.      Comments: No pain with extraocular movements.   Neck:      Comments: No asymmetry  Cardiovascular:      Rate and Rhythm: Normal rate and regular rhythm.   Pulmonary:      Effort: Pulmonary effort is normal. No respiratory distress.   Abdominal:      General: Abdomen is flat. There is no distension.      Palpations: Abdomen is soft.   Musculoskeletal:         General: Normal range of motion.      Cervical back: Normal range of motion and neck supple. No rigidity or tenderness.   Skin:     General: Skin is warm and dry.      Capillary Refill: Capillary refill takes less than 2 seconds.   Neurological:      General: No focal deficit present.      Mental Status: She is alert and oriented to person, place, and time. Mental status is at baseline.   Psychiatric:         Mood and Affect: Mood normal.         Behavior: Behavior normal.         Thought Content: Thought content normal.         Judgment: Judgment normal.            LAB:  All pertinent labs reviewed and interpreted.       RADIOLOGY:  Reviewed all pertinent imaging. Please see official radiology report.  No orders to display       PROCEDURES:   None      [facility] [employer] [facility] Documentation:   CMS Diagnoses:               Estela COHN, am serving as a scribe to document services personally performed by Elijah Alba MD, based on my observations and the provider's statements to me. Parth COHN Jonathan, MD, attest that Estela Myers is acting in a scribe capacity, has observed my performance of the  services and has documented them in accordance with my direction.      Dr. Elijah Alba  [facility] EMERGENCY ROOM  [address] [address] [phone]       Elijah Alba MD  12/21/23 4617

## 2023-12-22 NOTE — ED NOTES
AVS discussed with pt. Prescriptions provided. Education provided on worsening symptoms to watch out for, pain control, new prescriptions, and follow up with a dental provided. All questions were answered. See provider's notes for full assessment.

## 2023-12-22 NOTE — ED TRIAGE NOTES
Pt with a known dental abscess tooth #5.  Root Canal scheduled for next week.  C/O increased pain and facial swelling. Was not prescribed ABX.     Triage Assessment (Adult)       Row Name 12/21/23 9890          Triage Assessment    Airway WDL WDL        Respiratory WDL    Respiratory WDL WDL        Skin Circulation/Temperature WDL    Skin Circulation/Temperature WDL WDL        Cardiac WDL    Cardiac WDL WDL        Peripheral/Neurovascular WDL    Peripheral Neurovascular WDL WDL        Cognitive/Neuro/Behavioral WDL    Cognitive/Neuro/Behavioral WDL WDL

## 2024-03-18 ENCOUNTER — OFFICE VISIT (OUTPATIENT)
Dept: FAMILY MEDICINE | Facility: CLINIC | Age: 49
End: 2024-03-18
Payer: COMMERCIAL

## 2024-03-18 VITALS
RESPIRATION RATE: 16 BRPM | DIASTOLIC BLOOD PRESSURE: 85 MMHG | TEMPERATURE: 98.2 F | HEART RATE: 86 BPM | SYSTOLIC BLOOD PRESSURE: 132 MMHG | OXYGEN SATURATION: 99 %

## 2024-03-18 DIAGNOSIS — H10.9 BACTERIAL CONJUNCTIVITIS OF LEFT EYE: Primary | ICD-10-CM

## 2024-03-18 DIAGNOSIS — L03.213 PRESEPTAL CELLULITIS OF LEFT EYE: ICD-10-CM

## 2024-03-18 DIAGNOSIS — J06.9 VIRAL URI WITH COUGH: ICD-10-CM

## 2024-03-18 LAB — SARS-COV-2 RNA RESP QL NAA+PROBE: NEGATIVE

## 2024-03-18 PROCEDURE — 99214 OFFICE O/P EST MOD 30 MIN: CPT | Performed by: NURSE PRACTITIONER

## 2024-03-18 PROCEDURE — 87635 SARS-COV-2 COVID-19 AMP PRB: CPT | Performed by: NURSE PRACTITIONER

## 2024-03-18 RX ORDER — CEFDINIR 300 MG/1
300 CAPSULE ORAL 2 TIMES DAILY
Qty: 14 CAPSULE | Refills: 0 | Status: SHIPPED | OUTPATIENT
Start: 2024-03-18 | End: 2024-03-25

## 2024-03-18 RX ORDER — POLYMYXIN B SULFATE AND TRIMETHOPRIM 1; 10000 MG/ML; [USP'U]/ML
SOLUTION OPHTHALMIC
Qty: 10 ML | Refills: 0 | Status: SHIPPED | OUTPATIENT
Start: 2024-03-18 | End: 2024-07-19

## 2024-03-18 ASSESSMENT — ENCOUNTER SYMPTOMS: SHORTNESS OF BREATH: 0

## 2024-03-18 NOTE — LETTER
March 18, 2024      Bhavana Castellanos  [address]  [place] [address]        To Whom It May Concern:    Bhavana Castellanos  was seen on March 18.  Please excuse her  until March 20 through 21 based on symptoms due to illness.        Sincerely,        Grisel Parson, CNP

## 2024-03-19 NOTE — PROGRESS NOTES
Assessment & Plan     Viral URI with cough    - Symptomatic COVID-19 Virus (Coronavirus) by PCR    Preseptal cellulitis of left eye    - cefdinir (OMNICEF) 300 MG capsule  Dispense: 14 capsule; Refill: 0    Bacterial conjunctivitis of left eye    - polymixin b-trimethoprim (POLYTRIM) 03426-7.1 UNIT/ML-% ophthalmic solution  Dispense: 10 mL; Refill: 0     Patient with URI symptoms, upper eyelid swelling, discharge and tenderness to the left upper eyelid without pain with movement nor visual changes or fever.  Due to the degree of tenderness with minimally bacterial conjunctivitis with eyelid swelling, will treat for possible early preseptal cellulitis.  Discussed red flag symptoms as above.  Recheck if not better in 2 days.  Gave a [place] [facility] card as an option for recheck or to come back to urgent care or emergency room if symptoms seem to be severely worsening.    Symptomatic care with cool pack or warm pack, Tylenol or ibuprofen, treatment for URI such as DayQuil NyQuil, rest.  Off work tomorrow.      26 minutes spent by me on the date of the encounter doing chart review, review of test results, patient visit, and documentation         No follow-ups on file.    Grisel Parson, [facility]    Nenita Bateman is a 48 year old female who presents to clinic today for the following health issues:  Chief Complaint   Patient presents with    Conjunctivitis     Started this morning and then took a nap. Woke up with oozing and swelling in the LEFT eye. Painful, watery.     URI     X 2 days, sore throat, congestion, headache, low grade fever, sneezing.      HPI    Cough, congestion, sore throat with t max to 99.  I started feeling a little irritated this morning and woke up with current symptoms of swelling of the eyelid, yellow discharge.  Says she feels like her eye is bruised.    No worsening pain with movement of the eye.  No difficulty seeing other than when there is  discharge in the eye.    Eye this morning - lid swelling, yellow discharge with watering.      Does not wear contacts.    No eye itching.    Recently had a group camping event in [place].        Review of Systems   HENT:  Negative for ear pain.    Respiratory:  Negative for shortness of breath.                Objective    /85 (BP Location: Right arm, Patient Position: Sitting, Cuff Size: Adult Regular)   Pulse 86   Temp 98.2  F (36.8  C) (Oral)   Resp 16   LMP 03/14/2024 (Exact Date)   SpO2 99%   Physical Exam  Constitutional:       General: She is not in acute distress.     Appearance: She is well-developed.   HENT:      Nose: Congestion present.      Mouth/Throat:      Pharynx: No posterior oropharyngeal erythema.   Eyes:      General:         Right eye: No discharge.         Left eye: Discharge present.     Extraocular Movements: Extraocular movements intact.      Conjunctiva/sclera:      Right eye: Right conjunctiva is not injected.      Left eye: Left conjunctiva is injected.      Comments: Upper eyelid swelling, erythema.  A little tender to touch general.    No obvious stye.    + Tender right upper eyelid       Pulmonary:      Effort: Pulmonary effort is normal.      Breath sounds: Normal breath sounds.   Musculoskeletal:         General: Normal range of motion.   Skin:     General: Skin is warm and dry.      Capillary Refill: Capillary refill takes less than 2 seconds.   Neurological:      Mental Status: She is alert and oriented to person, place, and time.   Psychiatric:         Mood and Affect: Mood normal.         Behavior: Behavior normal.         Thought Content: Thought content normal.         Judgment: Judgment normal.

## 2024-03-19 NOTE — PATIENT INSTRUCTIONS
You may be developing a skin infection around the eyelid.    See an eye doctor if at anytime your eyes pain or swelling is worsening or your lids and pain or not starting to resolve after 1-1/2 to 2 days.  You can call [place] [facility].  You can also go to [facility] which has eye doctors on-call 24 hours a day in the emergency room if you are getting worse with pain, new fevers, or having difficulty seeing.     Start both the pills and the drops for this.  Cough medicine of choice such as DayQuil.

## 2024-06-18 ENCOUNTER — PATIENT OUTREACH (OUTPATIENT)
Dept: CARE COORDINATION | Facility: CLINIC | Age: 49
End: 2024-06-18
Payer: COMMERCIAL

## 2024-06-21 ENCOUNTER — OFFICE VISIT (OUTPATIENT)
Dept: FAMILY MEDICINE | Facility: CLINIC | Age: 49
End: 2024-06-21
Payer: COMMERCIAL

## 2024-06-21 VITALS
SYSTOLIC BLOOD PRESSURE: 130 MMHG | HEART RATE: 84 BPM | RESPIRATION RATE: 19 BRPM | OXYGEN SATURATION: 99 % | TEMPERATURE: 98.3 F | DIASTOLIC BLOOD PRESSURE: 89 MMHG

## 2024-06-21 DIAGNOSIS — J45.21 MILD INTERMITTENT ASTHMA WITH EXACERBATION: Primary | ICD-10-CM

## 2024-06-21 DIAGNOSIS — R05.3 PERSISTENT COUGH: ICD-10-CM

## 2024-06-21 DIAGNOSIS — K21.9 GASTROESOPHAGEAL REFLUX DISEASE, UNSPECIFIED WHETHER ESOPHAGITIS PRESENT: ICD-10-CM

## 2024-06-21 PROCEDURE — 99214 OFFICE O/P EST MOD 30 MIN: CPT | Performed by: FAMILY MEDICINE

## 2024-06-21 RX ORDER — OMEPRAZOLE 40 MG/1
40 CAPSULE, DELAYED RELEASE ORAL DAILY
Qty: 30 CAPSULE | Refills: 0 | Status: SHIPPED | OUTPATIENT
Start: 2024-06-21

## 2024-06-21 RX ORDER — PREDNISONE 20 MG/1
TABLET ORAL
Qty: 15 TABLET | Refills: 0 | Status: SHIPPED | OUTPATIENT
Start: 2024-06-21 | End: 2024-07-19

## 2024-06-21 RX ORDER — BENZONATATE 100 MG/1
100 CAPSULE ORAL 3 TIMES DAILY PRN
Qty: 30 CAPSULE | Refills: 0 | Status: SHIPPED | OUTPATIENT
Start: 2024-06-21

## 2024-06-21 NOTE — PATIENT INSTRUCTIONS
Continue albuterol inhaler with spacer as needed.     Tessalon perles 1 every 8 hours if needed for cough.     Prednisone, take with food, 40mg daily for 5 days then 20mg daily for 5 days. May taper faster if needed due to feeling better or side effects.     Omeprazole 40mg daily on empty stomach in morning 30 minutes before eating   For 2-4 weeks for acid reflux precautions and persistent cough    Recheck if worse or no better.

## 2024-06-21 NOTE — LETTER
[facility]  [address] [address]  Phone: [phone]  Fax: [phone]    June 21, 2024        Bhavana Castellanos  [address] [address]          To whom it may concern:    RE: Bhavana Castellanos    Patient was seen and treated today at our clinic.    Please contact me for questions or concerns.      Sincerely,      Teresita Albarado MD

## 2024-06-22 NOTE — PROGRESS NOTES
Assessment/Plan:   1. Persistent cough  - benzonatate (TESSALON) 100 MG capsule; Take 1 capsule (100 mg) by mouth 3 times daily as needed for cough  Dispense: 30 capsule; Refill: 0  2. Mild intermittent asthma with exacerbation  - predniSONE (DELTASONE) 20 MG tablet; 40mg daily for 5 days then 20mg daily for 5 days  Dispense: 15 tablet; Refill: 0  3. Gastroesophageal reflux disease, unspecified whether esophagitis present  - omeprazole (PRILOSEC) 40 MG DR capsule; Take 1 capsule (40 mg) by mouth daily  Dispense: 30 capsule; Refill: 0    Persistent cough with bronchospasm and wheezing following URI a month ago.   No sign of secondary bacterial infection.   Suspect mostly asthma exacerbation with post viral cough.     Plan:  Continue albuterol inhaler with spacer as needed.   Tessalon perles 1 every 8 hours if needed for cough.   Prednisone, take with food, 40mg daily for 5 days then 20mg daily for 5 days. May taper faster if needed due to feeling better or side effects.   Omeprazole 40mg daily on empty stomach in morning 30 minutes before eating   For 2-4 weeks for acid reflux precautions and persistent cough  Recheck if worse or no better.     I discussed red flag symptoms, return precautions to clinic/ER and follow up care with patient/parent.  Expected clinical course, symptomatic cares advised. Questions answered. Patient/parent amenable with plan.        Subjective:     Bhavana Castellanos is a 48 year old female with asthma history who presents for evaluation of cough.   Original illness started at the end of May with nasal congestion and cough. Covid negative at that time.   Cough never went away.   Now increased wheezing at night. Some shortness of breath. Albuterol helps a little bit.   Talking triggers cough.   Mild post nasal drainage. No sinus pain or pressure.   Claritin has not helped.   No fever. Cough is mostly dry.   No leg swelling or chest pain.   Some heartburn.     Allergies   Allergen Reactions     Amoxicillin Rash     Current Outpatient Medications   Medication Sig Dispense Refill    albuterol (PROAIR HFA/PROVENTIL HFA/VENTOLIN HFA) 108 (90 Base) MCG/ACT inhaler Inhale 2 puffs into the lungs every 4 hours as needed for shortness of breath, wheezing or cough 18 g 1    benzonatate (TESSALON) 100 MG capsule Take 1 capsule (100 mg) by mouth 3 times daily as needed for cough 30 capsule 0    levothyroxine (SYNTHROID/LEVOTHROID) 112 MCG tablet Take 1 tablet (112 mcg) by mouth daily 90 tablet 3    omeprazole (PRILOSEC) 40 MG DR capsule Take 1 capsule (40 mg) by mouth daily 30 capsule 0    predniSONE (DELTASONE) 20 MG tablet 40mg daily for 5 days then 20mg daily for 5 days 15 tablet 0    MULTIVITAMIN CHEW   OR take one per day (Patient not taking: Reported on 6/21/2024)      Omega-3 Fatty Acids (FISH OIL PO) Take 1 g by mouth daily  (Patient not taking: Reported on 6/21/2024)      polymixin b-trimethoprim (POLYTRIM) 55690-7.1 UNIT/ML-% ophthalmic solution 1 drop to each eye every 3 hours while awake for two days. Reduce to 1 drop each eye 4 times daily for 5 days. (Patient not taking: Reported on 6/21/2024) 10 mL 0    VITAMIN D 2000 UNIT OR TABS one tablet daily (Patient not taking: Reported on 6/21/2024)       No current facility-administered medications for this visit.     Patient Active Problem List   Diagnosis    Acquired hypothyroidism    CARDIOVASCULAR SCREENING; LDL GOAL LESS THAN 100    Joint pain    Moderate major depression (H)    Vitamin D deficiency disease    Situational anxiety    Fatty liver    Major depressive disorder, recurrent episode, moderate (H)    ASCUS of cervix with negative high risk HPV       Objective:     /89   Pulse 84   Temp 98.3  F (36.8  C)   Resp 19   SpO2 99%     Physical    General Appearance: Alert, pleasant, no distress, AVSS  Head: Normocephalic, without obvious abnormality, atraumatic  Eyes: Conjunctivae are normal.   Ears: Normal TMs and external ear canals, both  ears  Nose: No significant congestion.  Throat: Throat is normal.  No exudate.  No vesicular lesions  Neck: No adenopathy  Lungs: wheezes bilaterally, respirations unlabored  Heart: Regular rate and rhythm  Extremities: No lower extremity edema  Skin:  no rashes or lesions  Psychiatric: Patient has a normal mood and affect.             This note has been dictated in part using voice recognition software.  Any grammatical or context distortions are unintentional and inherent to the software.  Please feel free to contact me directly for clarification if needed.

## 2024-07-16 ENCOUNTER — PATIENT OUTREACH (OUTPATIENT)
Dept: CARE COORDINATION | Facility: CLINIC | Age: 49
End: 2024-07-16
Payer: COMMERCIAL

## 2024-07-18 SDOH — HEALTH STABILITY: PHYSICAL HEALTH: ON AVERAGE, HOW MANY MINUTES DO YOU ENGAGE IN EXERCISE AT THIS LEVEL?: 20 MIN

## 2024-07-18 SDOH — HEALTH STABILITY: PHYSICAL HEALTH: ON AVERAGE, HOW MANY DAYS PER WEEK DO YOU ENGAGE IN MODERATE TO STRENUOUS EXERCISE (LIKE A BRISK WALK)?: 3 DAYS

## 2024-07-18 ASSESSMENT — SOCIAL DETERMINANTS OF HEALTH (SDOH): HOW OFTEN DO YOU GET TOGETHER WITH FRIENDS OR RELATIVES?: ONCE A WEEK

## 2024-07-18 ASSESSMENT — PATIENT HEALTH QUESTIONNAIRE - PHQ9
SUM OF ALL RESPONSES TO PHQ QUESTIONS 1-9: 7
10. IF YOU CHECKED OFF ANY PROBLEMS, HOW DIFFICULT HAVE THESE PROBLEMS MADE IT FOR YOU TO DO YOUR WORK, TAKE CARE OF THINGS AT HOME, OR GET ALONG WITH OTHER PEOPLE: NOT DIFFICULT AT ALL
SUM OF ALL RESPONSES TO PHQ QUESTIONS 1-9: 7

## 2024-07-19 ENCOUNTER — HOSPITAL ENCOUNTER (OUTPATIENT)
Dept: MAMMOGRAPHY | Facility: CLINIC | Age: 49
Discharge: HOME | End: 2024-07-19
Attending: NURSE PRACTITIONER | Admitting: NURSE PRACTITIONER
Payer: COMMERCIAL

## 2024-07-19 ENCOUNTER — OFFICE VISIT (OUTPATIENT)
Dept: FAMILY MEDICINE | Facility: CLINIC | Age: 49
End: 2024-07-19
Payer: COMMERCIAL

## 2024-07-19 VITALS
WEIGHT: 202 LBS | RESPIRATION RATE: 14 BRPM | TEMPERATURE: 98.1 F | SYSTOLIC BLOOD PRESSURE: 124 MMHG | OXYGEN SATURATION: 98 % | HEART RATE: 74 BPM | BODY MASS INDEX: 34.49 KG/M2 | DIASTOLIC BLOOD PRESSURE: 82 MMHG | HEIGHT: 64 IN

## 2024-07-19 DIAGNOSIS — Z13.1 DIABETES MELLITUS SCREENING: ICD-10-CM

## 2024-07-19 DIAGNOSIS — E66.812 CLASS 2 OBESITY WITHOUT SERIOUS COMORBIDITY WITH BODY MASS INDEX (BMI) OF 35.0 TO 35.9 IN ADULT, UNSPECIFIED OBESITY TYPE: ICD-10-CM

## 2024-07-19 DIAGNOSIS — Z79.899 MEDICATION MANAGEMENT: ICD-10-CM

## 2024-07-19 DIAGNOSIS — E03.9 ACQUIRED HYPOTHYROIDISM: ICD-10-CM

## 2024-07-19 DIAGNOSIS — F33.0 MILD RECURRENT MAJOR DEPRESSION (H): ICD-10-CM

## 2024-07-19 DIAGNOSIS — Z13.220 LIPID SCREENING: ICD-10-CM

## 2024-07-19 DIAGNOSIS — Z12.31 VISIT FOR SCREENING MAMMOGRAM: ICD-10-CM

## 2024-07-19 DIAGNOSIS — Z00.00 ENCOUNTER FOR ROUTINE HISTORY AND PHYSICAL EXAM IN FEMALE: Primary | ICD-10-CM

## 2024-07-19 PROBLEM — F33.1 MAJOR DEPRESSIVE DISORDER, RECURRENT EPISODE, MODERATE (H): Status: RESOLVED | Noted: 2021-06-18 | Resolved: 2024-07-19

## 2024-07-19 PROBLEM — R73.03 PREDIABETES: Status: ACTIVE | Noted: 2024-07-19

## 2024-07-19 LAB
ALBUMIN SERPL BCG-MCNC: 4.1 G/DL (ref 3.5–5.2)
ALP SERPL-CCNC: 76 U/L (ref 40–150)
ALT SERPL W P-5'-P-CCNC: 33 U/L (ref 0–50)
ANION GAP SERPL CALCULATED.3IONS-SCNC: 11 MMOL/L (ref 7–15)
AST SERPL W P-5'-P-CCNC: 29 U/L (ref 0–45)
BILIRUB SERPL-MCNC: 0.3 MG/DL
BUN SERPL-MCNC: 9.4 MG/DL (ref 6–20)
CALCIUM SERPL-MCNC: 9 MG/DL (ref 8.8–10.4)
CHLORIDE SERPL-SCNC: 106 MMOL/L (ref 98–107)
CHOLEST SERPL-MCNC: 166 MG/DL
CREAT SERPL-MCNC: 0.86 MG/DL (ref 0.51–0.95)
EGFRCR SERPLBLD CKD-EPI 2021: 83 ML/MIN/1.73M2
FASTING STATUS PATIENT QL REPORTED: ABNORMAL
FASTING STATUS PATIENT QL REPORTED: ABNORMAL
GLUCOSE SERPL-MCNC: 112 MG/DL (ref 70–99)
HBA1C MFR BLD: 5.7 % (ref 0–5.6)
HCO3 SERPL-SCNC: 22 MMOL/L (ref 22–29)
HDLC SERPL-MCNC: 33 MG/DL
HGB BLD-MCNC: 13.1 G/DL (ref 11.7–15.7)
LDLC SERPL CALC-MCNC: 106 MG/DL
NONHDLC SERPL-MCNC: 133 MG/DL
POTASSIUM SERPL-SCNC: 4.1 MMOL/L (ref 3.4–5.3)
PROT SERPL-MCNC: 7 G/DL (ref 6.4–8.3)
SODIUM SERPL-SCNC: 139 MMOL/L (ref 135–145)
T4 FREE SERPL-MCNC: 1.24 NG/DL (ref 0.9–1.7)
TRIGL SERPL-MCNC: 135 MG/DL
TSH SERPL DL<=0.005 MIU/L-ACNC: 4.68 UIU/ML (ref 0.3–4.2)

## 2024-07-19 PROCEDURE — 77063 BREAST TOMOSYNTHESIS BI: CPT

## 2024-07-19 PROCEDURE — 99396 PREV VISIT EST AGE 40-64: CPT | Performed by: NURSE PRACTITIONER

## 2024-07-19 PROCEDURE — 80053 COMPREHEN METABOLIC PANEL: CPT | Performed by: NURSE PRACTITIONER

## 2024-07-19 PROCEDURE — 80061 LIPID PANEL: CPT | Performed by: NURSE PRACTITIONER

## 2024-07-19 PROCEDURE — 83036 HEMOGLOBIN GLYCOSYLATED A1C: CPT | Performed by: NURSE PRACTITIONER

## 2024-07-19 PROCEDURE — 84443 ASSAY THYROID STIM HORMONE: CPT | Performed by: NURSE PRACTITIONER

## 2024-07-19 PROCEDURE — 96127 BRIEF EMOTIONAL/BEHAV ASSMT: CPT | Performed by: NURSE PRACTITIONER

## 2024-07-19 PROCEDURE — 36415 COLL VENOUS BLD VENIPUNCTURE: CPT | Performed by: NURSE PRACTITIONER

## 2024-07-19 PROCEDURE — 99213 OFFICE O/P EST LOW 20 MIN: CPT | Mod: 25 | Performed by: NURSE PRACTITIONER

## 2024-07-19 PROCEDURE — 84439 ASSAY OF FREE THYROXINE: CPT | Performed by: NURSE PRACTITIONER

## 2024-07-19 PROCEDURE — 85018 HEMOGLOBIN: CPT | Performed by: NURSE PRACTITIONER

## 2024-07-19 RX ORDER — LEVOTHYROXINE SODIUM 112 UG/1
112 TABLET ORAL DAILY
Qty: 90 TABLET | Refills: 3 | Status: SHIPPED | OUTPATIENT
Start: 2024-07-19 | End: 2024-07-21

## 2024-07-19 ASSESSMENT — PAIN SCALES - GENERAL: PAINLEVEL: MILD PAIN (2)

## 2024-07-19 NOTE — PROGRESS NOTES
Assessment and Plan:    Encounter for routine history and physical exam in female  Recommend consuming a healthy diet and exercising.   She recently took oral steroids.  Will defer vaccines for now.  She will follow-up for pneumococcal and hepatitis B vaccines.  She is up to date on cervical cancer and colorectal cancer screening.  She has a mammogram scheduled for later.  - Hemoglobin    Lipid screening  - Lipid panel reflex to direct LDL Fasting    Diabetes mellitus screening  - Hemoglobin A1c    Acquired hypothyroidism  Will check thyroid cascade and adjust Levothyroxine accordingly. She continues 112 mcg daily.    - TSH with free T4 reflex    Mild recurrent major depression (H24)  PHQ9 score is 7.  She is not currently taking medications.      Class 2 obesity without serious comorbidity with body mass index (BMI) of 35.0 to 35.9 in adult, unspecified obesity type  Recommend eating healthy and increasing activity level.     Medication management  - Comprehensive metabolic panel (BMP + Alb, Alk Phos, ALT, AST, Total. Bili, TP)      Subjective:     Bhavana is a 48 year old female presenting to the clinic for a female physical.     LMP: last week, every 50-60 days   Hx of abnormal pap smear: 6/18/21 ASCUS, positive HPV   Last pap smear: 6/21/22 normal, negative HPV   Perform self-breast exams: yes  Vaginal discharge or irritation: none   Sexually active: not currently, single   Contraception: none   Concerns for STDs: none   Previous pregnancies:none    Patient has hypothyroidism which is controlled levothyroxine 112 mcg daily. She denies dry skin, hair loss, and fatigue. She has had some gradual weight gain.  She has a history of depression.  She feels as though her mood is stable without medication.  She denies thoughts of suicide.  She feels well supported.  Patient states she has a history of asthma which was diagnosed in her 20s.  Her asthma flares with exercise and temperature changes.  She uses Albuterol as  needed.     Review of systems:  I performed a 10 point review of systems.  All pertinent positives and negatives are noted in the HPI. All others are negative.     Allergies   Allergen Reactions    Amoxicillin Rash       Current Outpatient Medications   Medication Sig Dispense Refill    albuterol (PROAIR HFA/PROVENTIL HFA/VENTOLIN HFA) 108 (90 Base) MCG/ACT inhaler Inhale 2 puffs into the lungs every 4 hours as needed for shortness of breath, wheezing or cough 18 g 1    benzonatate (TESSALON) 100 MG capsule Take 1 capsule (100 mg) by mouth 3 times daily as needed for cough 30 capsule 0    levothyroxine (SYNTHROID/LEVOTHROID) 112 MCG tablet Take 1 tablet (112 mcg) by mouth daily 90 tablet 3    omeprazole (PRILOSEC) 40 MG DR capsule Take 1 capsule (40 mg) by mouth daily 30 capsule 0     No current facility-administered medications for this visit.       Social History     Socioeconomic History    Marital status: Single     Spouse name: Not on file    Number of children: Not on file    Years of education: Not on file    Highest education level: Not on file   Occupational History    Occupation: Storee     Employer: [employer]   Tobacco Use    Smoking status: Never     Passive exposure: Never    Smokeless tobacco: Never   Vaping Use    Vaping status: Never Used   Substance and Sexual Activity    Alcohol use: Not Currently     Alcohol/week: 1.0 standard drink of alcohol     Types: 1 Standard drinks or equivalent per week    Drug use: Never    Sexual activity: Not Currently     Partners: Male     Birth control/protection: Condom   Other Topics Concern    Parent/sibling w/ CABG, MI or angioplasty before 65F 55M? No   Social History Narrative    Dairy/d 3-4 servings/d.     Caffeine 1-2 servings/d    Exercise 7 x week    Sunscreen used - Yes    Seatbelts used - Yes    Working smoke/CO detectors in the home - Yes    Guns stored in the home - No    Self Breast Exams - Yes    Self Testicular Exam - Yes     Eye Exam up to date - Yes    Dental Exam up to date - Yes    Pap Smear up to date - No    Mammogram up to date - No    PSA up to date - No    Dexa Scan up to date - No    Flex Sig / Colonoscopy up to date - No    Immunizations up to date - Yes TD 2007    Abuse: Current or Past(Physical, Sexual or Emotional)- No    Do you feel safe in your environment - Yes    ISRRAEL Saunders CMA    3/5/2010 updated                     Social Determinants of Health     Financial Resource Strain: Low Risk  (7/18/2024)    Financial Resource Strain     Within the past 12 months, have you or your family members you live with been unable to get utilities (heat, electricity) when it was really needed?: No   Food Insecurity: Low Risk  (7/18/2024)    Food Insecurity     Within the past 12 months, did you worry that your food would run out before you got money to buy more?: No     Within the past 12 months, did the food you bought just not last and you didn t have money to get more?: No   Transportation Needs: Low Risk  (7/18/2024)    Transportation Needs     Within the past 12 months, has lack of transportation kept you from medical appointments, getting your medicines, non-medical meetings or appointments, work, or from getting things that you need?: No   Physical Activity: Insufficiently Active (7/18/2024)    Exercise Vital Sign     Days of Exercise per Week: 3 days     Minutes of Exercise per Session: 20 min   Stress: Stress Concern Present (7/18/2024)    [employer] - Occupational Stress Questionnaire     Feeling of Stress : To some extent   Social Connections: Unknown (7/18/2024)    Social Connection and Isolation Panel [NHANES]     Frequency of Communication with Friends and Family: Not on file     Frequency of Social Gatherings with Friends and Family: Once a week     Attends Yazidism Services: Not on file     Active Member of Clubs or Organizations: Not on file     Attends Club or Organization Meetings: Not  "on file     Marital Status: Not on file   Interpersonal Safety: Low Risk  (7/19/2024)    Interpersonal Safety     Do you feel physically and emotionally safe where you currently live?: Yes     Within the past 12 months, have you been hit, slapped, kicked or otherwise physically hurt by someone?: No     Within the past 12 months, have you been humiliated or emotionally abused in other ways by your partner or ex-partner?: No   Housing Stability: Low Risk  (7/18/2024)    Housing Stability     Do you have housing? : Yes     Are you worried about losing your housing?: No       Past Medical History:   Diagnosis Date    Depressive disorder 1994    History of blood transfusion 1976    Shortly after I was born    Hypothyroidism        Family History   Problem Relation Age of Onset    Cancer Father         kidney cancer: in remission    Other Cancer Father         Kidney    Depression Father     Other Cancer Maternal Grandmother         Bladder    Thyroid Disease Maternal Grandmother     Hypertension Maternal Grandfather     Diabetes Maternal Grandfather     Thyroid Disease Paternal Grandmother     Other Cancer Paternal Grandfather         Stomach    Hypertension Brother     Diabetes Brother        Past Surgical History:   Procedure Laterality Date    ENT SURGERY      wisdom teeth removal    EXCISE LESION HEAD N/A 7/17/2017    Procedure: EXCISE LESION HEAD;  (LOCAL) EXCISION OF RIGHT SCALP MASS AND LEFT POSTERIOR SCALP MASS x2;  Surgeon: Lori Acosta MD;  Location: [facility]    HEAD & NECK SURGERY  7/2018    cysts removed from scalp       Objective:     /82   Pulse 74   Temp 98.1  F (36.7  C)   Resp 14   Ht 1.613 m (5' 3.5\")   Wt 91.6 kg (202 lb)   LMP 07/10/2024   SpO2 98%   Breastfeeding No   BMI 35.22 kg/m      Patient is alert, no obvious distress.   Skin: Warm, dry.  No rashes or lesions. Skin turgor rapid return.   HEENT:  Eyes normal.  Ears normal.  Nose patent, mucosa pink.  Oropharynx mucosa pink, no " lesions or tonsil enlargement.   Neck:  Supple, without lymphadenopathy, bruits, JVD. Thyroid normal texture and size.    Lungs:  Clear to auscultation.  No wheezing, rales noted.  Respirations even and unlabored.   Heart:  Regular rate and rhythm.  No murmurs.   Breasts:  Normal.  No surrounding adenopathy.   Abdomen: Soft, nontender.  No organomegaly.  Bowel sounds normoactive.  No guarding or masses noted.   :  deferred  Musculoskeletal:  Full ROM of extremities.  Muscle strength equal +5/5.   Neurological:  Cranial nerves 2-12 intact.            Answers submitted by the patient for this visit:  Patient Health Questionnaire (Submitted on 7/18/2024)  If you checked off any problems, how difficult have these problems made it for you to do your work, take care of things at home, or get along with other people?: Not difficult at all  PHQ9 TOTAL SCORE: 7

## 2024-07-21 DIAGNOSIS — E03.9 ACQUIRED HYPOTHYROIDISM: Primary | ICD-10-CM

## 2024-07-21 RX ORDER — LEVOTHYROXINE SODIUM 125 UG/1
125 TABLET ORAL DAILY
Qty: 90 TABLET | Refills: 0 | Status: SHIPPED | OUTPATIENT
Start: 2024-07-21

## 2024-08-05 DIAGNOSIS — E03.9 ACQUIRED HYPOTHYROIDISM: Primary | ICD-10-CM

## 2024-08-12 ENCOUNTER — TRANSFERRED RECORDS (OUTPATIENT)
Dept: HEALTH INFORMATION MANAGEMENT | Facility: CLINIC | Age: 49
End: 2024-08-12

## 2024-09-05 ENCOUNTER — LAB (OUTPATIENT)
Dept: LAB | Facility: CLINIC | Age: 49
End: 2024-09-05
Payer: COMMERCIAL

## 2024-09-05 DIAGNOSIS — E03.9 ACQUIRED HYPOTHYROIDISM: ICD-10-CM

## 2024-09-05 LAB
T4 FREE SERPL-MCNC: 1.71 NG/DL (ref 0.9–1.7)
TSH SERPL DL<=0.005 MIU/L-ACNC: 0.12 UIU/ML (ref 0.3–4.2)

## 2024-09-05 PROCEDURE — 84439 ASSAY OF FREE THYROXINE: CPT

## 2024-09-05 PROCEDURE — 84443 ASSAY THYROID STIM HORMONE: CPT

## 2024-09-05 PROCEDURE — 36415 COLL VENOUS BLD VENIPUNCTURE: CPT

## 2024-09-07 DIAGNOSIS — E03.9 ACQUIRED HYPOTHYROIDISM: Primary | ICD-10-CM

## 2024-09-07 RX ORDER — LEVOTHYROXINE SODIUM 112 UG/1
112 TABLET ORAL DAILY
Qty: 90 TABLET | Refills: 0 | Status: SHIPPED | OUTPATIENT
Start: 2024-09-07 | End: 2024-09-12

## 2024-09-12 DIAGNOSIS — E03.9 ACQUIRED HYPOTHYROIDISM: ICD-10-CM

## 2024-09-12 RX ORDER — LEVOTHYROXINE SODIUM 112 UG/1
112 TABLET ORAL DAILY
Qty: 90 TABLET | Refills: 0 | Status: SHIPPED | OUTPATIENT
Start: 2024-09-12

## 2024-09-12 RX ORDER — LEVOTHYROXINE SODIUM 112 UG/1
112 TABLET ORAL DAILY
Qty: 90 TABLET | Refills: 3 | OUTPATIENT
Start: 2024-09-12

## 2024-10-19 ENCOUNTER — IMMUNIZATION (OUTPATIENT)
Dept: FAMILY MEDICINE | Facility: CLINIC | Age: 49
End: 2024-10-19
Payer: COMMERCIAL

## 2024-10-19 PROCEDURE — 90471 IMMUNIZATION ADMIN: CPT

## 2024-10-19 PROCEDURE — 90656 IIV3 VACC NO PRSV 0.5 ML IM: CPT

## 2024-12-11 DIAGNOSIS — E03.9 ACQUIRED HYPOTHYROIDISM: ICD-10-CM

## 2024-12-11 RX ORDER — LEVOTHYROXINE SODIUM 112 UG/1
112 TABLET ORAL DAILY
Qty: 30 TABLET | Refills: 0 | Status: SHIPPED | OUTPATIENT
Start: 2024-12-11

## 2024-12-20 ENCOUNTER — LAB (OUTPATIENT)
Dept: LAB | Facility: CLINIC | Age: 49
End: 2024-12-20
Payer: COMMERCIAL

## 2024-12-20 DIAGNOSIS — E03.9 ACQUIRED HYPOTHYROIDISM: ICD-10-CM

## 2024-12-20 PROCEDURE — 36415 COLL VENOUS BLD VENIPUNCTURE: CPT

## 2024-12-20 PROCEDURE — 84443 ASSAY THYROID STIM HORMONE: CPT

## 2024-12-21 LAB — TSH SERPL DL<=0.005 MIU/L-ACNC: 2.1 UIU/ML (ref 0.3–4.2)

## 2024-12-22 ENCOUNTER — MYC REFILL (OUTPATIENT)
Dept: FAMILY MEDICINE | Facility: CLINIC | Age: 49
End: 2024-12-22
Payer: COMMERCIAL

## 2024-12-22 DIAGNOSIS — E03.9 ACQUIRED HYPOTHYROIDISM: ICD-10-CM

## 2024-12-23 RX ORDER — LEVOTHYROXINE SODIUM 112 UG/1
112 TABLET ORAL DAILY
Qty: 30 TABLET | Refills: 0 | Status: SHIPPED | OUTPATIENT
Start: 2024-12-23 | End: 2024-12-24

## 2024-12-24 DIAGNOSIS — E03.9 ACQUIRED HYPOTHYROIDISM: ICD-10-CM

## 2024-12-24 RX ORDER — LEVOTHYROXINE SODIUM 112 UG/1
112 TABLET ORAL DAILY
Qty: 90 TABLET | Refills: 0 | Status: SHIPPED | OUTPATIENT
Start: 2024-12-24

## 2025-03-10 DIAGNOSIS — E03.9 ACQUIRED HYPOTHYROIDISM: ICD-10-CM

## 2025-03-11 RX ORDER — LEVOTHYROXINE SODIUM 112 UG/1
112 TABLET ORAL DAILY
Qty: 90 TABLET | Refills: 0 | Status: SHIPPED | OUTPATIENT
Start: 2025-03-11

## 2025-03-25 ENCOUNTER — OFFICE VISIT (OUTPATIENT)
Dept: INTERNAL MEDICINE | Facility: CLINIC | Age: 50
End: 2025-03-25
Payer: COMMERCIAL

## 2025-03-25 ENCOUNTER — HOSPITAL ENCOUNTER (OUTPATIENT)
Dept: ULTRASOUND IMAGING | Facility: HOSPITAL | Age: 50
Discharge: HOME | End: 2025-03-25
Attending: INTERNAL MEDICINE | Admitting: INTERNAL MEDICINE
Payer: COMMERCIAL

## 2025-03-25 VITALS
HEIGHT: 64 IN | RESPIRATION RATE: 16 BRPM | SYSTOLIC BLOOD PRESSURE: 124 MMHG | WEIGHT: 210.6 LBS | DIASTOLIC BLOOD PRESSURE: 88 MMHG | OXYGEN SATURATION: 99 % | TEMPERATURE: 97.5 F | HEART RATE: 88 BPM | BODY MASS INDEX: 35.96 KG/M2

## 2025-03-25 DIAGNOSIS — M79.89 LEFT LEG SWELLING: Primary | ICD-10-CM

## 2025-03-25 DIAGNOSIS — M79.89 LEFT LEG SWELLING: ICD-10-CM

## 2025-03-25 DIAGNOSIS — E03.9 ACQUIRED HYPOTHYROIDISM: ICD-10-CM

## 2025-03-25 PROCEDURE — 93971 EXTREMITY STUDY: CPT | Mod: LT

## 2025-03-25 PROCEDURE — 3074F SYST BP LT 130 MM HG: CPT | Performed by: INTERNAL MEDICINE

## 2025-03-25 PROCEDURE — 99204 OFFICE O/P NEW MOD 45 MIN: CPT | Performed by: INTERNAL MEDICINE

## 2025-03-25 PROCEDURE — 3079F DIAST BP 80-89 MM HG: CPT | Performed by: INTERNAL MEDICINE

## 2025-03-25 PROCEDURE — G2211 COMPLEX E/M VISIT ADD ON: HCPCS | Performed by: INTERNAL MEDICINE

## 2025-03-25 RX ORDER — FAMOTIDINE 10 MG
10 TABLET ORAL 2 TIMES DAILY
COMMUNITY

## 2025-03-25 ASSESSMENT — ASTHMA QUESTIONNAIRES
QUESTION_3 LAST FOUR WEEKS HOW OFTEN DID YOUR ASTHMA SYMPTOMS (WHEEZING, COUGHING, SHORTNESS OF BREATH, CHEST TIGHTNESS OR PAIN) WAKE YOU UP AT NIGHT OR EARLIER THAN USUAL IN THE MORNING: NOT AT ALL
QUESTION_1 LAST FOUR WEEKS HOW MUCH OF THE TIME DID YOUR ASTHMA KEEP YOU FROM GETTING AS MUCH DONE AT WORK, SCHOOL OR AT HOME: NONE OF THE TIME
ACT_TOTALSCORE: 24
QUESTION_2 LAST FOUR WEEKS HOW OFTEN HAVE YOU HAD SHORTNESS OF BREATH: ONCE OR TWICE A WEEK
QUESTION_5 LAST FOUR WEEKS HOW WOULD YOU RATE YOUR ASTHMA CONTROL: COMPLETELY CONTROLLED
QUESTION_4 LAST FOUR WEEKS HOW OFTEN HAVE YOU USED YOUR RESCUE INHALER OR NEBULIZER MEDICATION (SUCH AS ALBUTEROL): NOT AT ALL

## 2025-03-25 NOTE — PROGRESS NOTES
"  Assessment & Plan     Left leg swelling  Acute left lower leg swelling and heaviness for 2 weeks, and more pain. Pain was waking her up from sleep last night. No injuries, prolonged sitting, travelling, previous DVT.  Pain mostly around lateral malleolus and calf, but feeling heaviness in the whole leg.    On the exam, gait normal, no significant edema in the upper leg and calf, mostly swelling around he ankle.     US venous will be done STAT to rule out DVT.    Edema. The swelling is likely multifactorial including but not limited to the following: their weight, dependency of the limbs for most hours of the day, reduced activity with reduced calf pump mechanism,  I recommend wearing compression garments every day; such as compression stockings or velcro wraps.     If negative for DVT,  we can obtain venous insufficiency study to evaluate for incompetence. The patient should continue to elevate their legs periodically throughout the day.   - US Lower Extremity Venous Duplex Left; Future    Hypothyroidism  On levothyroxine.  Component      Latest Ref Rng 9/5/2024  4:10 PM   TSH      0.30 - 4.20 uIU/mL 0.12 (L)       Not hypothyroid, so edema not related to thyroid disease.    The longitudinal plan of care for the diagnosis(es)/condition(s) as documented were addressed during this visit. Due to the added complexity in care, I will continue to support Bhavana in the subsequent management and with ongoing continuity of care.      BMI  Estimated body mass index is 36.72 kg/m  as calculated from the following:    Height as of this encounter: 1.613 m (5' 3.5\").    Weight as of this encounter: 95.5 kg (210 lb 9.6 oz).             Subjective   Bhavana is a 49 year old, presenting for the following health issues:  Leg Swelling (Left Leg/Ankle Swelling x2 Week's Getting Worse With Mild Pain )      3/25/2025     2:44 PM   Additional Questions   Roomed by Annamarie     History of Present Illness       Reason for visit:  Left leg " "swelling  Symptom onset:  1-2 weeks ago  Symptoms include:  Left leg and ankle swelling, feeling heavy. Noticable at night and interfering with sleep. It is gradually getting worse. Ankle hurts a little too.  Symptom intensity:  Moderate  Symptom progression:  Worsening  Had these symptoms before:  Yes  Has tried/received treatment for these symptoms:  Yes  Previous treatment was successful:  Yes  Prior treatment description:  Elevating feet, compression socks usually are all I need for ankle swelling  What makes it worse:  Sitting or standing for a long time.  What makes it better:  Elevating legs helps somewhat but not as much as usual   She is taking medications regularly.                      Objective    /88   Pulse 88   Temp 97.5  F (36.4  C) (Temporal)   Resp 16   Ht 1.613 m (5' 3.5\")   Wt 95.5 kg (210 lb 9.6 oz)   SpO2 99%   BMI 36.72 kg/m    Body mass index is 36.72 kg/m .  Physical Exam               Signed Electronically by: Jaret Denson MD    "

## 2025-05-01 ENCOUNTER — APPOINTMENT (OUTPATIENT)
Dept: URBAN - METROPOLITAN AREA CLINIC 260 | Age: 50
Setting detail: DERMATOLOGY
End: 2025-05-02

## 2025-05-01 VITALS — WEIGHT: 200 LBS | RESPIRATION RATE: 15 BRPM | HEIGHT: 55 IN

## 2025-05-01 DIAGNOSIS — L72.11 PILAR CYST: ICD-10-CM

## 2025-05-01 DIAGNOSIS — D22 MELANOCYTIC NEVI: ICD-10-CM

## 2025-05-01 DIAGNOSIS — Z71.89 OTHER SPECIFIED COUNSELING: ICD-10-CM

## 2025-05-01 DIAGNOSIS — L81.4 OTHER MELANIN HYPERPIGMENTATION: ICD-10-CM

## 2025-05-01 DIAGNOSIS — L90.8 OTHER ATROPHIC DISORDERS OF SKIN: ICD-10-CM

## 2025-05-01 DIAGNOSIS — L82.1 OTHER SEBORRHEIC KERATOSIS: ICD-10-CM

## 2025-05-01 DIAGNOSIS — D18.0 HEMANGIOMA: ICD-10-CM

## 2025-05-01 PROBLEM — D18.01 HEMANGIOMA OF SKIN AND SUBCUTANEOUS TISSUE: Status: ACTIVE | Noted: 2025-05-01

## 2025-05-01 PROBLEM — D23.71 OTHER BENIGN NEOPLASM OF SKIN OF RIGHT LOWER LIMB, INCLUDING HIP: Status: ACTIVE | Noted: 2025-05-01

## 2025-05-01 PROBLEM — D22.5 MELANOCYTIC NEVI OF TRUNK: Status: ACTIVE | Noted: 2025-05-01

## 2025-05-01 PROCEDURE — OTHER REASSURANCE: OTHER

## 2025-05-01 PROCEDURE — OTHER MIPS QUALITY: OTHER

## 2025-05-01 PROCEDURE — OTHER COUNSELING: OTHER

## 2025-05-01 ASSESSMENT — LOCATION SIMPLE DESCRIPTION DERM
LOCATION SIMPLE: LEFT PLANTAR SURFACE
LOCATION SIMPLE: UPPER BACK
LOCATION SIMPLE: RIGHT PLANTAR SURFACE
LOCATION SIMPLE: LOWER BACK
LOCATION SIMPLE: POSTERIOR SCALP

## 2025-05-01 ASSESSMENT — LOCATION DETAILED DESCRIPTION DERM
LOCATION DETAILED: SUPERIOR LUMBAR SPINE
LOCATION DETAILED: LEFT ARCH
LOCATION DETAILED: RIGHT ARCH
LOCATION DETAILED: RIGHT POSTERIOR PARIETAL SCALP
LOCATION DETAILED: RIGHT SUPERIOR OCCIPITAL SCALP
LOCATION DETAILED: INFERIOR THORACIC SPINE

## 2025-05-01 ASSESSMENT — LOCATION ZONE DERM
LOCATION ZONE: FEET
LOCATION ZONE: SCALP
LOCATION ZONE: TRUNK

## 2025-06-09 DIAGNOSIS — E03.9 ACQUIRED HYPOTHYROIDISM: ICD-10-CM

## 2025-06-09 RX ORDER — LEVOTHYROXINE SODIUM 112 UG/1
112 TABLET ORAL DAILY
Qty: 90 TABLET | Refills: 1 | Status: SHIPPED | OUTPATIENT
Start: 2025-06-09

## 2025-06-19 ENCOUNTER — PATIENT OUTREACH (OUTPATIENT)
Dept: CARE COORDINATION | Facility: CLINIC | Age: 50
End: 2025-06-19
Payer: COMMERCIAL

## 2025-07-17 ENCOUNTER — PATIENT OUTREACH (OUTPATIENT)
Dept: CARE COORDINATION | Facility: CLINIC | Age: 50
End: 2025-07-17
Payer: COMMERCIAL

## 2025-07-30 SDOH — HEALTH STABILITY: PHYSICAL HEALTH: ON AVERAGE, HOW MANY DAYS PER WEEK DO YOU ENGAGE IN MODERATE TO STRENUOUS EXERCISE (LIKE A BRISK WALK)?: 3 DAYS

## 2025-07-30 SDOH — HEALTH STABILITY: PHYSICAL HEALTH: ON AVERAGE, HOW MANY MINUTES DO YOU ENGAGE IN EXERCISE AT THIS LEVEL?: 20 MIN

## 2025-07-30 ASSESSMENT — SOCIAL DETERMINANTS OF HEALTH (SDOH): HOW OFTEN DO YOU GET TOGETHER WITH FRIENDS OR RELATIVES?: ONCE A WEEK

## 2025-07-30 ASSESSMENT — PATIENT HEALTH QUESTIONNAIRE - PHQ9
SUM OF ALL RESPONSES TO PHQ QUESTIONS 1-9: 7
SUM OF ALL RESPONSES TO PHQ QUESTIONS 1-9: 7
10. IF YOU CHECKED OFF ANY PROBLEMS, HOW DIFFICULT HAVE THESE PROBLEMS MADE IT FOR YOU TO DO YOUR WORK, TAKE CARE OF THINGS AT HOME, OR GET ALONG WITH OTHER PEOPLE: NOT DIFFICULT AT ALL

## 2025-07-31 ENCOUNTER — OFFICE VISIT (OUTPATIENT)
Dept: FAMILY MEDICINE | Facility: CLINIC | Age: 50
End: 2025-07-31
Payer: COMMERCIAL

## 2025-07-31 VITALS
TEMPERATURE: 98.3 F | OXYGEN SATURATION: 98 % | RESPIRATION RATE: 20 BRPM | WEIGHT: 209 LBS | HEART RATE: 78 BPM | HEIGHT: 64 IN | DIASTOLIC BLOOD PRESSURE: 78 MMHG | BODY MASS INDEX: 35.68 KG/M2 | SYSTOLIC BLOOD PRESSURE: 120 MMHG

## 2025-07-31 DIAGNOSIS — E66.812 CLASS 2 SEVERE OBESITY WITH BODY MASS INDEX (BMI) OF 35 TO 39.9 WITH SERIOUS COMORBIDITY (H): ICD-10-CM

## 2025-07-31 DIAGNOSIS — Z12.11 SCREEN FOR COLON CANCER: ICD-10-CM

## 2025-07-31 DIAGNOSIS — M25.571 ACUTE BILATERAL ANKLE PAIN: ICD-10-CM

## 2025-07-31 DIAGNOSIS — E79.0 ELEVATED URIC ACID IN BLOOD: ICD-10-CM

## 2025-07-31 DIAGNOSIS — Z12.4 CERVICAL CANCER SCREENING: ICD-10-CM

## 2025-07-31 DIAGNOSIS — E03.9 ACQUIRED HYPOTHYROIDISM: ICD-10-CM

## 2025-07-31 DIAGNOSIS — J45.20 MILD INTERMITTENT ASTHMA WITHOUT COMPLICATION: ICD-10-CM

## 2025-07-31 DIAGNOSIS — Z00.00 ENCOUNTER FOR ROUTINE HISTORY AND PHYSICAL EXAM IN FEMALE: Primary | ICD-10-CM

## 2025-07-31 DIAGNOSIS — E78.2 MIXED HYPERLIPIDEMIA: ICD-10-CM

## 2025-07-31 DIAGNOSIS — Z79.899 MEDICATION MANAGEMENT: ICD-10-CM

## 2025-07-31 DIAGNOSIS — R73.03 PREDIABETES: ICD-10-CM

## 2025-07-31 DIAGNOSIS — E03.9 ACQUIRED HYPOTHYROIDISM: Primary | ICD-10-CM

## 2025-07-31 DIAGNOSIS — M25.572 ACUTE BILATERAL ANKLE PAIN: ICD-10-CM

## 2025-07-31 DIAGNOSIS — E66.01 CLASS 2 SEVERE OBESITY WITH BODY MASS INDEX (BMI) OF 35 TO 39.9 WITH SERIOUS COMORBIDITY (H): ICD-10-CM

## 2025-07-31 LAB
ALBUMIN SERPL BCG-MCNC: 4.1 G/DL (ref 3.5–5.2)
ALP SERPL-CCNC: 78 U/L (ref 40–150)
ALT SERPL W P-5'-P-CCNC: 28 U/L (ref 0–50)
ANION GAP SERPL CALCULATED.3IONS-SCNC: 9 MMOL/L (ref 7–15)
AST SERPL W P-5'-P-CCNC: 28 U/L (ref 0–45)
BILIRUB SERPL-MCNC: 0.4 MG/DL
BUN SERPL-MCNC: 10.9 MG/DL (ref 6–20)
CALCIUM SERPL-MCNC: 9.1 MG/DL (ref 8.8–10.4)
CHLORIDE SERPL-SCNC: 106 MMOL/L (ref 98–107)
CHOLEST SERPL-MCNC: 149 MG/DL
CREAT SERPL-MCNC: 0.88 MG/DL (ref 0.51–0.95)
EGFRCR SERPLBLD CKD-EPI 2021: 81 ML/MIN/1.73M2
EST. AVERAGE GLUCOSE BLD GHB EST-MCNC: 120 MG/DL
FASTING STATUS PATIENT QL REPORTED: ABNORMAL
FASTING STATUS PATIENT QL REPORTED: NORMAL
GLUCOSE SERPL-MCNC: 91 MG/DL (ref 70–99)
HBA1C MFR BLD: 5.8 % (ref 0–5.6)
HBV SURFACE AB SERPL IA-ACNC: 99.7 M[IU]/ML
HBV SURFACE AB SERPL IA-ACNC: REACTIVE M[IU]/ML
HCO3 SERPL-SCNC: 22 MMOL/L (ref 22–29)
HDLC SERPL-MCNC: 38 MG/DL
HGB BLD-MCNC: 12.4 G/DL (ref 11.7–15.7)
LDLC SERPL CALC-MCNC: 87 MG/DL
MCV RBC AUTO: 87 FL (ref 78–100)
NONHDLC SERPL-MCNC: 111 MG/DL
POTASSIUM SERPL-SCNC: 4.4 MMOL/L (ref 3.4–5.3)
PROT SERPL-MCNC: 7 G/DL (ref 6.4–8.3)
SODIUM SERPL-SCNC: 137 MMOL/L (ref 135–145)
T4 FREE SERPL-MCNC: 1.23 NG/DL (ref 0.9–1.7)
TRIGL SERPL-MCNC: 120 MG/DL
TSH SERPL DL<=0.005 MIU/L-ACNC: 4.78 UIU/ML (ref 0.3–4.2)
URATE SERPL-MCNC: 6.7 MG/DL (ref 2.4–5.7)

## 2025-07-31 RX ORDER — PREDNISONE 20 MG/1
20 TABLET ORAL 2 TIMES DAILY
Qty: 10 TABLET | Refills: 0 | Status: SHIPPED | OUTPATIENT
Start: 2025-07-31 | End: 2025-08-05

## 2025-07-31 RX ORDER — ALBUTEROL SULFATE 90 UG/1
2 INHALANT RESPIRATORY (INHALATION) EVERY 4 HOURS PRN
Qty: 18 G | Refills: 1 | Status: SHIPPED | OUTPATIENT
Start: 2025-07-31

## 2025-07-31 RX ORDER — LEVOTHYROXINE SODIUM 125 UG/1
125 TABLET ORAL
Qty: 90 TABLET | Refills: 0 | Status: SHIPPED | OUTPATIENT
Start: 2025-07-31

## 2025-07-31 RX ORDER — LEVOTHYROXINE SODIUM 112 UG/1
112 TABLET ORAL DAILY
Qty: 90 TABLET | Refills: 3 | Status: SHIPPED | OUTPATIENT
Start: 2025-07-31 | End: 2025-07-31

## 2025-07-31 ASSESSMENT — PAIN SCALES - GENERAL: PAINLEVEL_OUTOF10: NO PAIN (0)

## 2025-07-31 NOTE — PROGRESS NOTES
Assessment and Plan:    Encounter for routine history and physical exam in female  Recommend consuming a healthy diet and exercising.  Provided pneumococcal and COVID vaccines.  She has a mammogram scheduled for this afternoon.  Cologuard ordered.  Pap smear obtained.  - Hemoglobin  - Hepatitis B Surface Antibody    Cervical cancer screening  - HPV and Gynecologic Cytology Panel - Recommended Age 30 - 65 Years    Screen for colon cancer  - Colonoscopy Screening  Referral    Acquired hypothyroidism  Will check thyroid cascade and adjust levothyroxine accordingly.  - levothyroxine (SYNTHROID/LEVOTHROID) 112 MCG tablet  Dispense: 90 tablet; Refill: 3  - TSH with free T4 reflex    Mild intermittent asthma without complication  This is controlled.  She continues albuterol as needed.  - albuterol (PROAIR HFA/PROVENTIL HFA/VENTOLIN HFA) 108 (90 Base) MCG/ACT inhaler  Dispense: 18 g; Refill: 1    Acute bilateral ankle pain  Will rule out gout.  X-ray shows no acute fracture or abnormality.  Will radiology review.  Suspect tendinitis.  Will refer to orthopedics for further evaluation and treatment.  - XR Ankle Bilateral G/E 3 Views  - Uric acid  - Orthopedic  Referral    Class 2 severe obesity with body mass index (BMI) of 35 to 39.9 with serious comorbidity (H)  Recommend consuming a healthy diet and exercising.  This is contributing to prediabetes, hyperlipidemia.    Prediabetes  Will check A1c.  - Hemoglobin A1c    Mixed hyperlipidemia  Will check lipid cascade.  - Lipid panel reflex to direct LDL Fasting    Medication management  - Comprehensive metabolic panel    The longitudinal plan of care for the diagnosis(es)/condition(s) as documented were addressed during this visit. Due to the added complexity in care, I will continue to support Bhavana in the subsequent management and with ongoing continuity of care.        Subjective:     Bhavana is a 49 year old female presenting to the clinic for a female  physical.     LMP: end of June, regular once/month   Hx of abnormal pap smear: 6/18/21 ASCUS, positive HPV   Last pap smear: 6/21/22 normal, negative HPV   Perform self-breast exams: yes  Vaginal discharge or irritation: none   Sexually active: not currently, single   Contraception: none   Concerns for STDs: none   Previous pregnancies:none    Patient has hypothyroidism and is taking levothyroxine 112 mcg daily.  She denies dry skin, hair loss, weight fluctuations.  Last TSH was 2.10 in 12/20/2024.  She has a history of depression and is not taking medication.  She feels as though her mood is stable.  She denies thoughts of suicide.  She has a history of asthma.  Patient states she was doing well until the poor air quality over the past week.  She typically uses albuterol once every few months.  Patient has been experiencing bilateral ankle pain since the spring.  Pain is an intermittent ache.  Her left ankle feels worse than her right.  She has a history of dancing and cheerleading when she was younger.  She typically wears braces when she exercises.  She has changed her suit shoes and tried some online physical therapy activities.  She has not noticed redness, swelling, bruising.  Patient participates in a fencing club. She has a history of a uric acid elevation after having mono 10 years ago.  She has a history of prediabetes with an A1c of 5.7% in 7/19/2024.  She has a history of hyperlipidemia with a total cholesterol 166, triglycerides 135, HDL 33,  on 7/19/2024.      Review of systems:  I performed a 10 point review of systems.  All pertinent positives and negatives are noted in the HPI. All others are negative.     Allergies   Allergen Reactions    Amoxicillin Rash       Current Outpatient Medications   Medication Sig Dispense Refill    albuterol (PROAIR HFA/PROVENTIL HFA/VENTOLIN HFA) 108 (90 Base) MCG/ACT inhaler Inhale 2 puffs into the lungs every 4 hours as needed for shortness of breath, wheezing  or cough 18 g 1    benzonatate (TESSALON) 100 MG capsule Take 1 capsule (100 mg) by mouth 3 times daily as needed for cough 30 capsule 0    famotidine (PEPCID) 10 MG tablet Take 10 mg by mouth 2 times daily.      levothyroxine (SYNTHROID/LEVOTHROID) 112 MCG tablet TAKE 1 TABLET DAILY 90 tablet 1     No current facility-administered medications for this visit.       Social History     Socioeconomic History    Marital status: Single     Spouse name: Not on file    Number of children: Not on file    Years of education: Not on file    Highest education level: Not on file   Occupational History    Occupation: [employer]     Employer: [employer]   Tobacco Use    Smoking status: Never     Passive exposure: Never    Smokeless tobacco: Never   Vaping Use    Vaping status: Never Used   Substance and Sexual Activity    Alcohol use: Not Currently     Alcohol/week: 1.0 standard drink of alcohol     Types: 1 Standard drinks or equivalent per week    Drug use: Never    Sexual activity: Not Currently     Partners: Male     Birth control/protection: Condom   Other Topics Concern    Parent/sibling w/ CABG, MI or angioplasty before 65F 55M? No   Social History Narrative    Dairy/d 3-4 servings/d.     Caffeine 1-2 servings/d    Exercise 7 x week    Sunscreen used - Yes    Seatbelts used - Yes    Working smoke/CO detectors in the home - Yes    Guns stored in the home - No    Self Breast Exams - Yes    Self Testicular Exam - Yes    Eye Exam up to date - Yes    Dental Exam up to date - Yes    Pap Smear up to date - No    Mammogram up to date - No    PSA up to date - No    Dexa Scan up to date - No    Flex Sig / Colonoscopy up to date - No    Immunizations up to date - Yes TD 2007    Abuse: Current or Past(Physical, Sexual or Emotional)- No    Do you feel safe in your environment - Yes    ISRRAEL Saunders CMA    3/5/2010 updated                     Social Drivers of Health     Financial Resource Strain: Low Risk  (7/30/2025)     Financial Resource Strain     Within the past 12 months, have you or your family members you live with been unable to get utilities (heat, electricity) when it was really needed?: No   Food Insecurity: Low Risk  (7/30/2025)    Food Insecurity     Within the past 12 months, did you worry that your food would run out before you got money to buy more?: No     Within the past 12 months, did the food you bought just not last and you didn t have money to get more?: No   Transportation Needs: Low Risk  (7/30/2025)    Transportation Needs     Within the past 12 months, has lack of transportation kept you from medical appointments, getting your medicines, non-medical meetings or appointments, work, or from getting things that you need?: No   Physical Activity: Insufficiently Active (7/30/2025)    Exercise Vital Sign     Days of Exercise per Week: 3 days     Minutes of Exercise per Session: 20 min   Stress: Stress Concern Present (7/30/2025)    [employer] [employer] - Occupational Stress Questionnaire     Feeling of Stress : To some extent   Social Connections: Unknown (7/30/2025)    Social Connection and Isolation Panel [NHANES]     Frequency of Communication with Friends and Family: Not on file     Frequency of Social Gatherings with Friends and Family: Once a week     Attends Yarsanism Services: Not on file     Active Member of Clubs or Organizations: Not on file     Attends Club or Organization Meetings: Not on file     Marital Status: Not on file   Interpersonal Safety: Low Risk  (7/31/2025)    Interpersonal Safety     Do you feel physically and emotionally safe where you currently live?: Yes     Within the past 12 months, have you been hit, slapped, kicked or otherwise physically hurt by someone?: No     Within the past 12 months, have you been humiliated or emotionally abused in other ways by your partner or ex-partner?: No   Housing Stability: Low Risk  (7/30/2025)    Housing Stability     Do you  "have housing? : Yes     Are you worried about losing your housing?: No       Past Medical History:   Diagnosis Date    Depressive disorder 1994    History of blood transfusion 1976    Shortly after I was born    Hypothyroidism        Family History   Problem Relation Age of Onset    Cancer Father         kidney cancer: in remission    Other Cancer Father         Kidney    Depression Father     Other Cancer Maternal Grandmother         Bladder    Thyroid Disease Maternal Grandmother     Hypertension Maternal Grandfather     Diabetes Maternal Grandfather     Thyroid Disease Paternal Grandmother     Other Cancer Paternal Grandfather         Stomach    Hypertension Brother     Diabetes Brother        Past Surgical History:   Procedure Laterality Date    ENT SURGERY      wisdom teeth removal    EXCISE LESION HEAD N/A 7/17/2017    Procedure: EXCISE LESION HEAD;  (LOCAL) EXCISION OF RIGHT SCALP MASS AND LEFT POSTERIOR SCALP MASS x2;  Surgeon: Lori Acosta MD;  Location: [facility]    HEAD & NECK SURGERY  7/2018    cysts removed from scalp       Objective:     /78   Pulse 78   Temp 98.3  F (36.8  C)   Resp 20   Ht 1.613 m (5' 3.5\")   Wt 94.8 kg (209 lb)   LMP 06/24/2025   SpO2 98%   Breastfeeding No   BMI 36.44 kg/m      Patient is alert, no obvious distress.   Skin: Warm, dry.  No rashes or lesions. Skin turgor rapid return.   HEENT:  Eyes normal.  Ears normal.  Nose patent, mucosa pink.  Oropharynx mucosa pink, no lesions or tonsil enlargement.   Neck:  Supple, without lymphadenopathy, bruits, JVD. Thyroid normal texture and size.    Lungs:  Clear to auscultation.  No wheezing, rales noted.  Respirations even and unlabored.   Heart:  Regular rate and rhythm.  No murmurs.   Breasts:  Normal.  No surrounding adenopathy.   Abdomen: Soft, nontender.  No organomegaly.  Bowel sounds normoactive.  No guarding or masses noted.   :  External genitalia normal.  Normal vaginal mucosa.  Cervix no lesions or cervical " motion tenderness.  Musculoskeletal: She has full range of motion of both ankles.  She is tender to palpation of the medial malleolus of the left ankle.  No erythema, ecchymosis, signs of trauma.  Anterior drawer and talar tilt test are negative bilaterally.  Neurological:  Cranial nerves 2-12 intact.        I ordered and personally reviewed bilateral ankle x-rays showing no obvious fracture.  Will have radiology review.             Answers submitted by the patient for this visit:  Patient Health Questionnaire (Submitted on 7/30/2025)  If you checked off any problems, how difficult have these problems made it for you to do your work, take care of things at home, or get along with other people?: Not difficult at all  PHQ9 TOTAL SCORE: 7

## 2025-08-04 ENCOUNTER — PATIENT OUTREACH (OUTPATIENT)
Dept: CARE COORDINATION | Facility: CLINIC | Age: 50
End: 2025-08-04
Payer: COMMERCIAL

## 2025-08-05 LAB
BKR AP ASSOCIATED HPV REPORT: NORMAL
BKR LAB AP GYN ADEQUACY: NORMAL
BKR LAB AP GYN INTERPRETATION: NORMAL
BKR LAB AP PREVIOUS ABNORMAL: NORMAL
PATH REPORT.COMMENTS IMP SPEC: NORMAL
PATH REPORT.COMMENTS IMP SPEC: NORMAL
PATH REPORT.RELEVANT HX SPEC: NORMAL

## 2025-08-07 ENCOUNTER — TRANSFERRED RECORDS (OUTPATIENT)
Dept: HEALTH INFORMATION MANAGEMENT | Facility: CLINIC | Age: 50
End: 2025-08-07
Payer: COMMERCIAL

## 2025-08-11 DIAGNOSIS — Z12.11 COLON CANCER SCREENING: ICD-10-CM

## 2025-08-18 ENCOUNTER — TRANSFERRED RECORDS (OUTPATIENT)
Dept: HEALTH INFORMATION MANAGEMENT | Facility: CLINIC | Age: 50
End: 2025-08-18
Payer: COMMERCIAL

## (undated) DEVICE — SOL NACL 0.9% IRRIG 1000ML BOTTLE 07138-09

## (undated) DEVICE — SPONGE RAY-TEC 4X8" 7318

## (undated) DEVICE — ESU ELEC NDL 1" E1552

## (undated) DEVICE — SU VICRYL 3-0 SH 27" J316H

## (undated) DEVICE — ESU ELEC BLADE 2.75" COATED/INSULATED E1455

## (undated) DEVICE — GLOVE PROTEXIS BLUE W/NEU-THERA 6.5  2D73EB65

## (undated) DEVICE — SU MONOCRYL 4-0 PS-2 18" UND Y496G

## (undated) DEVICE — GLOVE PROTEXIS MICRO 6.0  2D73PM60

## (undated) DEVICE — PREP BALL KERLIX ROUND LATEX

## (undated) DEVICE — LINEN TOWEL PACK X5 5464

## (undated) DEVICE — SYR EAR BULB 3OZ 0035830

## (undated) DEVICE — SU ETHILON 4-0 FS-2 18" 662H

## (undated) DEVICE — PACK MINOR SBA15MIFSE